# Patient Record
Sex: FEMALE | Race: WHITE | Employment: OTHER | URBAN - METROPOLITAN AREA
[De-identification: names, ages, dates, MRNs, and addresses within clinical notes are randomized per-mention and may not be internally consistent; named-entity substitution may affect disease eponyms.]

---

## 2017-04-29 ENCOUNTER — APPOINTMENT (OUTPATIENT)
Dept: ULTRASOUND IMAGING | Age: 82
End: 2017-04-29
Payer: MEDICARE

## 2017-04-29 ENCOUNTER — HOSPITAL ENCOUNTER (EMERGENCY)
Age: 82
Discharge: HOME | End: 2017-04-30
Payer: MEDICARE

## 2017-04-29 VITALS
HEART RATE: 73 BPM | DIASTOLIC BLOOD PRESSURE: 93 MMHG | WEIGHT: 180 LBS | HEIGHT: 64 IN | TEMPERATURE: 97.9 F | OXYGEN SATURATION: 95 % | SYSTOLIC BLOOD PRESSURE: 195 MMHG | BODY MASS INDEX: 30.73 KG/M2

## 2017-04-29 DIAGNOSIS — R60.9 PERIPHERAL EDEMA: Primary | ICD-10-CM

## 2017-04-29 LAB
ALBUMIN SERPL-MCNC: 3.5 G/DL (ref 3.9–4.9)
ALP BLD-CCNC: 111 U/L (ref 40–130)
ALT SERPL-CCNC: 10 U/L (ref 0–33)
ANION GAP SERPL CALCULATED.3IONS-SCNC: 8 MEQ/L (ref 7–13)
APTT: 34.9 SEC (ref 21.6–35.4)
AST SERPL-CCNC: 19 U/L (ref 0–35)
BASOPHILS ABSOLUTE: 0.1 K/UL (ref 0–0.2)
BASOPHILS RELATIVE PERCENT: 1.7 %
BILIRUB SERPL-MCNC: 0.4 MG/DL (ref 0–1.2)
BUN BLDV-MCNC: 10 MG/DL (ref 8–23)
CALCIUM SERPL-MCNC: 9.3 MG/DL (ref 8.6–10.2)
CHLORIDE BLD-SCNC: 100 MEQ/L (ref 98–107)
CO2: 27 MEQ/L (ref 22–29)
CREAT SERPL-MCNC: 0.72 MG/DL (ref 0.5–0.9)
EOSINOPHILS ABSOLUTE: 0.2 K/UL (ref 0–0.7)
EOSINOPHILS RELATIVE PERCENT: 3.4 %
GFR AFRICAN AMERICAN: >60
GFR NON-AFRICAN AMERICAN: >60
GLOBULIN: 2.9 G/DL (ref 2.3–3.5)
GLUCOSE BLD-MCNC: 118 MG/DL (ref 74–109)
HCT VFR BLD CALC: 38.3 % (ref 37–47)
HEMOGLOBIN: 12.6 G/DL (ref 12–16)
INR BLD: 1.7
LYMPHOCYTES ABSOLUTE: 1.5 K/UL (ref 1–4.8)
LYMPHOCYTES RELATIVE PERCENT: 21.7 %
MCH RBC QN AUTO: 29 PG (ref 27–31.3)
MCHC RBC AUTO-ENTMCNC: 32.8 % (ref 33–37)
MCV RBC AUTO: 88.3 FL (ref 82–100)
MONOCYTES ABSOLUTE: 0.6 K/UL (ref 0.2–0.8)
MONOCYTES RELATIVE PERCENT: 9.3 %
NEUTROPHILS ABSOLUTE: 4.3 K/UL (ref 1.4–6.5)
NEUTROPHILS RELATIVE PERCENT: 63.9 %
PDW BLD-RTO: 14.8 % (ref 11.5–14.5)
PLATELET # BLD: 233 K/UL (ref 130–400)
POTASSIUM SERPL-SCNC: 3.6 MEQ/L (ref 3.5–5.1)
PROTHROMBIN TIME: 18.7 SEC (ref 8.1–13.7)
RBC # BLD: 4.34 M/UL (ref 4.2–5.4)
SODIUM BLD-SCNC: 135 MEQ/L (ref 132–144)
TOTAL PROTEIN: 6.4 G/DL (ref 6.4–8.1)
WBC # BLD: 6.7 K/UL (ref 4.8–10.8)

## 2017-04-29 PROCEDURE — 36415 COLL VENOUS BLD VENIPUNCTURE: CPT

## 2017-04-29 PROCEDURE — 80053 COMPREHEN METABOLIC PANEL: CPT

## 2017-04-29 PROCEDURE — 93971 EXTREMITY STUDY: CPT

## 2017-04-29 PROCEDURE — 99284 EMERGENCY DEPT VISIT MOD MDM: CPT

## 2017-04-29 PROCEDURE — 85025 COMPLETE CBC W/AUTO DIFF WBC: CPT

## 2017-04-29 PROCEDURE — 85730 THROMBOPLASTIN TIME PARTIAL: CPT

## 2017-04-29 PROCEDURE — 85610 PROTHROMBIN TIME: CPT

## 2017-04-29 RX ORDER — SENNOSIDES 8.6 MG
500 CAPSULE ORAL
COMMUNITY
Start: 2012-08-14

## 2017-04-29 RX ORDER — CARVEDILOL 25 MG/1
25 TABLET ORAL 2 TIMES DAILY
COMMUNITY
Start: 2016-08-08

## 2017-04-29 RX ORDER — MIRTAZAPINE 30 MG/1
30 TABLET, FILM COATED ORAL
COMMUNITY
Start: 2016-10-14

## 2017-04-29 RX ORDER — WARFARIN SODIUM 2.5 MG/1
TABLET ORAL
Status: ON HOLD | COMMUNITY
Start: 2016-10-31 | End: 2020-07-13 | Stop reason: HOSPADM

## 2017-04-29 RX ORDER — LOSARTAN POTASSIUM 50 MG/1
50 TABLET ORAL
COMMUNITY
Start: 2017-04-20

## 2017-04-29 RX ORDER — CLONAZEPAM 0.5 MG/1
0.5 TABLET ORAL
Status: ON HOLD | COMMUNITY
Start: 2017-01-05 | End: 2018-01-12

## 2017-04-29 RX ORDER — POTASSIUM CHLORIDE 750 MG/1
10 TABLET, EXTENDED RELEASE ORAL 2 TIMES DAILY
COMMUNITY
Start: 2016-07-21

## 2017-04-30 ASSESSMENT — ENCOUNTER SYMPTOMS
ALLERGIC/IMMUNOLOGIC NEGATIVE: 1
APNEA: 0
TROUBLE SWALLOWING: 0
EYE PAIN: 0
ABDOMINAL PAIN: 0
COLOR CHANGE: 0
SHORTNESS OF BREATH: 0

## 2017-10-18 ENCOUNTER — HOSPITAL ENCOUNTER (OUTPATIENT)
Dept: MRI IMAGING | Age: 82
Discharge: HOME | End: 2017-10-18
Payer: MEDICARE

## 2017-10-18 DIAGNOSIS — M25.562 LEFT KNEE PAIN, UNSPECIFIED CHRONICITY: ICD-10-CM

## 2017-10-18 PROCEDURE — 73721 MRI JNT OF LWR EXTRE W/O DYE: CPT

## 2018-01-02 ENCOUNTER — HOSPITAL ENCOUNTER (OUTPATIENT)
Dept: PREADMISSION TESTING | Age: 83
Discharge: HOME | End: 2018-01-02
Payer: MEDICARE

## 2018-01-02 VITALS
HEIGHT: 64 IN | DIASTOLIC BLOOD PRESSURE: 86 MMHG | WEIGHT: 180.8 LBS | SYSTOLIC BLOOD PRESSURE: 178 MMHG | RESPIRATION RATE: 16 BRPM | OXYGEN SATURATION: 96 % | BODY MASS INDEX: 30.87 KG/M2 | TEMPERATURE: 98.1 F | HEART RATE: 78 BPM

## 2018-01-02 PROBLEM — M17.12 DEGENERATIVE JOINT DISEASE OF KNEE, LEFT: Status: ACTIVE | Noted: 2018-01-02

## 2018-01-02 LAB
ABO/RH: NORMAL
ANTIBODY SCREEN: NORMAL

## 2018-01-02 PROCEDURE — 86901 BLOOD TYPING SEROLOGIC RH(D): CPT

## 2018-01-02 PROCEDURE — 86850 RBC ANTIBODY SCREEN: CPT

## 2018-01-02 PROCEDURE — 86900 BLOOD TYPING SEROLOGIC ABO: CPT

## 2018-01-02 RX ORDER — LIDOCAINE HYDROCHLORIDE 10 MG/ML
1 INJECTION, SOLUTION EPIDURAL; INFILTRATION; INTRACAUDAL; PERINEURAL
Status: CANCELLED | OUTPATIENT
Start: 2018-01-02 | End: 2018-01-02

## 2018-01-02 RX ORDER — SODIUM CHLORIDE 0.9 % (FLUSH) 0.9 %
10 SYRINGE (ML) INJECTION EVERY 12 HOURS SCHEDULED
Status: CANCELLED | OUTPATIENT
Start: 2018-01-02

## 2018-01-02 RX ORDER — SODIUM CHLORIDE 0.9 % (FLUSH) 0.9 %
10 SYRINGE (ML) INJECTION PRN
Status: CANCELLED | OUTPATIENT
Start: 2018-01-02

## 2018-01-02 RX ORDER — SODIUM CHLORIDE, SODIUM LACTATE, POTASSIUM CHLORIDE, CALCIUM CHLORIDE 600; 310; 30; 20 MG/100ML; MG/100ML; MG/100ML; MG/100ML
INJECTION, SOLUTION INTRAVENOUS CONTINUOUS
Status: CANCELLED | OUTPATIENT
Start: 2018-01-02

## 2018-01-08 ENCOUNTER — ANESTHESIA EVENT (OUTPATIENT)
Dept: OPERATING ROOM | Age: 83
DRG: 469 | End: 2018-01-08
Payer: MEDICARE

## 2018-01-09 ENCOUNTER — HOSPITAL ENCOUNTER (INPATIENT)
Age: 83
LOS: 4 days | Discharge: HOME HEALTH | DRG: 469 | End: 2018-01-13
Attending: ORTHOPAEDIC SURGERY | Admitting: ORTHOPAEDIC SURGERY
Payer: MEDICARE

## 2018-01-09 ENCOUNTER — ANESTHESIA (OUTPATIENT)
Dept: OPERATING ROOM | Age: 83
DRG: 469 | End: 2018-01-09
Payer: MEDICARE

## 2018-01-09 ENCOUNTER — APPOINTMENT (OUTPATIENT)
Dept: GENERAL RADIOLOGY | Age: 83
DRG: 469 | End: 2018-01-09
Attending: ORTHOPAEDIC SURGERY
Payer: MEDICARE

## 2018-01-09 VITALS — OXYGEN SATURATION: 98 % | SYSTOLIC BLOOD PRESSURE: 126 MMHG | TEMPERATURE: 94.3 F | DIASTOLIC BLOOD PRESSURE: 68 MMHG

## 2018-01-09 PROBLEM — Z96.652 S/P TOTAL KNEE REPLACEMENT USING CEMENT, LEFT: Status: ACTIVE | Noted: 2018-01-09

## 2018-01-09 PROBLEM — I48.0 PAF (PAROXYSMAL ATRIAL FIBRILLATION) (HCC): Status: ACTIVE | Noted: 2017-01-05

## 2018-01-09 LAB
INR BLD: 1.1
PROTHROMBIN TIME: 11.7 SEC (ref 8.1–13.7)

## 2018-01-09 PROCEDURE — 3700000000 HC ANESTHESIA ATTENDED CARE: Performed by: ORTHOPAEDIC SURGERY

## 2018-01-09 PROCEDURE — G8987 SELF CARE CURRENT STATUS: HCPCS

## 2018-01-09 PROCEDURE — 6360000002 HC RX W HCPCS: Performed by: NURSE ANESTHETIST, CERTIFIED REGISTERED

## 2018-01-09 PROCEDURE — 97162 PT EVAL MOD COMPLEX 30 MIN: CPT

## 2018-01-09 PROCEDURE — 1210000000 HC MED SURG R&B

## 2018-01-09 PROCEDURE — 3600000014 HC SURGERY LEVEL 4 ADDTL 15MIN: Performed by: ORTHOPAEDIC SURGERY

## 2018-01-09 PROCEDURE — 7100000000 HC PACU RECOVERY - FIRST 15 MIN: Performed by: ORTHOPAEDIC SURGERY

## 2018-01-09 PROCEDURE — 0SRD0J9 REPLACEMENT OF LEFT KNEE JOINT WITH SYNTHETIC SUBSTITUTE, CEMENTED, OPEN APPROACH: ICD-10-PCS | Performed by: ORTHOPAEDIC SURGERY

## 2018-01-09 PROCEDURE — 3600000004 HC SURGERY LEVEL 4 BASE: Performed by: ORTHOPAEDIC SURGERY

## 2018-01-09 PROCEDURE — 6370000000 HC RX 637 (ALT 250 FOR IP): Performed by: NURSE PRACTITIONER

## 2018-01-09 PROCEDURE — 2580000003 HC RX 258: Performed by: STUDENT IN AN ORGANIZED HEALTH CARE EDUCATION/TRAINING PROGRAM

## 2018-01-09 PROCEDURE — 2580000003 HC RX 258: Performed by: ORTHOPAEDIC SURGERY

## 2018-01-09 PROCEDURE — 6360000002 HC RX W HCPCS: Performed by: ORTHOPAEDIC SURGERY

## 2018-01-09 PROCEDURE — 87086 URINE CULTURE/COLONY COUNT: CPT

## 2018-01-09 PROCEDURE — 97167 OT EVAL HIGH COMPLEX 60 MIN: CPT

## 2018-01-09 PROCEDURE — 6360000002 HC RX W HCPCS: Performed by: ANESTHESIOLOGY

## 2018-01-09 PROCEDURE — 3700000001 HC ADD 15 MINUTES (ANESTHESIA): Performed by: ORTHOPAEDIC SURGERY

## 2018-01-09 PROCEDURE — 85610 PROTHROMBIN TIME: CPT

## 2018-01-09 PROCEDURE — 6370000000 HC RX 637 (ALT 250 FOR IP): Performed by: ORTHOPAEDIC SURGERY

## 2018-01-09 PROCEDURE — 64445 NJX AA&/STRD SCIATIC NRV IMG: CPT | Performed by: ANESTHESIOLOGY

## 2018-01-09 PROCEDURE — C1713 ANCHOR/SCREW BN/BN,TIS/BN: HCPCS | Performed by: ORTHOPAEDIC SURGERY

## 2018-01-09 PROCEDURE — G8988 SELF CARE GOAL STATUS: HCPCS

## 2018-01-09 PROCEDURE — 2500000003 HC RX 250 WO HCPCS: Performed by: NURSE ANESTHETIST, CERTIFIED REGISTERED

## 2018-01-09 PROCEDURE — G8978 MOBILITY CURRENT STATUS: HCPCS

## 2018-01-09 PROCEDURE — G8979 MOBILITY GOAL STATUS: HCPCS

## 2018-01-09 PROCEDURE — 7100000001 HC PACU RECOVERY - ADDTL 15 MIN: Performed by: ORTHOPAEDIC SURGERY

## 2018-01-09 PROCEDURE — 2500000003 HC RX 250 WO HCPCS: Performed by: ORTHOPAEDIC SURGERY

## 2018-01-09 PROCEDURE — A6199 ALGINATE DRSG WOUND FILLER: HCPCS | Performed by: ORTHOPAEDIC SURGERY

## 2018-01-09 PROCEDURE — 6370000000 HC RX 637 (ALT 250 FOR IP): Performed by: INTERNAL MEDICINE

## 2018-01-09 PROCEDURE — 2720000010 HC SURG SUPPLY STERILE: Performed by: ORTHOPAEDIC SURGERY

## 2018-01-09 PROCEDURE — C1776 JOINT DEVICE (IMPLANTABLE): HCPCS | Performed by: ORTHOPAEDIC SURGERY

## 2018-01-09 PROCEDURE — 2500000003 HC RX 250 WO HCPCS: Performed by: STUDENT IN AN ORGANIZED HEALTH CARE EDUCATION/TRAINING PROGRAM

## 2018-01-09 PROCEDURE — 73560 X-RAY EXAM OF KNEE 1 OR 2: CPT

## 2018-01-09 DEVICE — Z DUP USE 2207257 PSN TIB STM 5 DEG SZ E L: Type: IMPLANTABLE DEVICE | Status: FUNCTIONAL

## 2018-01-09 DEVICE — CEMENT BNE 20ML 40GM ACRYL RESIN HI VISC RADPQ FAST SET: Type: IMPLANTABLE DEVICE | Status: FUNCTIONAL

## 2018-01-09 DEVICE — ANCHOR SUT L14MM DIA4.5MM BIOCOMPOSITE W/ TWO SZ 2: Type: IMPLANTABLE DEVICE | Status: FUNCTIONAL

## 2018-01-09 DEVICE — IMPLANTABLE DEVICE: Type: IMPLANTABLE DEVICE | Status: FUNCTIONAL

## 2018-01-09 DEVICE — SCREWDRIVER SURG OD2MM HEX FEM CANN KNEE SET S STL NXGN: Type: IMPLANTABLE DEVICE | Status: FUNCTIONAL

## 2018-01-09 DEVICE — DUP USE 360449 IMPL KNEE PSN STR HYB ST 14 X +30 M: Type: IMPLANTABLE DEVICE | Status: FUNCTIONAL

## 2018-01-09 DEVICE — JIG SURG LT KNEE AREF PT SPEC DISP PERSONA: Type: IMPLANTABLE DEVICE | Status: FUNCTIONAL

## 2018-01-09 DEVICE — COMPONENT PAT DIA26MM THK7.5MM POLYETH CEM CONVENTIONAL: Type: IMPLANTABLE DEVICE | Status: FUNCTIONAL

## 2018-01-09 DEVICE — SYSTEM TOT KNEE: Type: IMPLANTABLE DEVICE | Status: FUNCTIONAL

## 2018-01-09 RX ORDER — MIRTAZAPINE 15 MG/1
30 TABLET, FILM COATED ORAL NIGHTLY
Status: DISCONTINUED | OUTPATIENT
Start: 2018-01-09 | End: 2018-01-10

## 2018-01-09 RX ORDER — SODIUM CHLORIDE, SODIUM LACTATE, POTASSIUM CHLORIDE, CALCIUM CHLORIDE 600; 310; 30; 20 MG/100ML; MG/100ML; MG/100ML; MG/100ML
INJECTION, SOLUTION INTRAVENOUS CONTINUOUS
Status: DISCONTINUED | OUTPATIENT
Start: 2018-01-09 | End: 2018-01-09

## 2018-01-09 RX ORDER — ONDANSETRON 2 MG/ML
4 INJECTION INTRAMUSCULAR; INTRAVENOUS EVERY 6 HOURS PRN
Status: DISCONTINUED | OUTPATIENT
Start: 2018-01-09 | End: 2018-01-13 | Stop reason: HOSPADM

## 2018-01-09 RX ORDER — CARVEDILOL 25 MG/1
25 TABLET ORAL 2 TIMES DAILY
Status: DISCONTINUED | OUTPATIENT
Start: 2018-01-09 | End: 2018-01-13 | Stop reason: HOSPADM

## 2018-01-09 RX ORDER — ROPIVACAINE HYDROCHLORIDE 5 MG/ML
INJECTION, SOLUTION EPIDURAL; INFILTRATION; PERINEURAL PRN
Status: DISCONTINUED | OUTPATIENT
Start: 2018-01-09 | End: 2018-01-09 | Stop reason: SDUPTHER

## 2018-01-09 RX ORDER — POTASSIUM CHLORIDE 750 MG/1
10 TABLET, FILM COATED, EXTENDED RELEASE ORAL 2 TIMES DAILY
Status: DISCONTINUED | OUTPATIENT
Start: 2018-01-09 | End: 2018-01-13 | Stop reason: HOSPADM

## 2018-01-09 RX ORDER — HYDRALAZINE HYDROCHLORIDE 20 MG/ML
20 INJECTION INTRAMUSCULAR; INTRAVENOUS EVERY 4 HOURS PRN
Status: DISCONTINUED | OUTPATIENT
Start: 2018-01-09 | End: 2018-01-13 | Stop reason: HOSPADM

## 2018-01-09 RX ORDER — SENNA AND DOCUSATE SODIUM 50; 8.6 MG/1; MG/1
1 TABLET, FILM COATED ORAL DAILY
Status: DISCONTINUED | OUTPATIENT
Start: 2018-01-09 | End: 2018-01-13 | Stop reason: HOSPADM

## 2018-01-09 RX ORDER — BISACODYL 10 MG
10 SUPPOSITORY, RECTAL RECTAL DAILY PRN
Status: DISCONTINUED | OUTPATIENT
Start: 2018-01-09 | End: 2018-01-13 | Stop reason: HOSPADM

## 2018-01-09 RX ORDER — DIMETHICONE, CAMPHOR (SYNTHETIC), MENTHOL, AND PHENOL 1.1; .5; .625; .5 G/100G; G/100G; G/100G; G/100G
OINTMENT TOPICAL
Status: DISPENSED
Start: 2018-01-09 | End: 2018-01-09

## 2018-01-09 RX ORDER — HYDROCODONE BITARTRATE AND ACETAMINOPHEN 5; 325 MG/1; MG/1
2 TABLET ORAL PRN
Status: DISCONTINUED | OUTPATIENT
Start: 2018-01-09 | End: 2018-01-09 | Stop reason: HOSPADM

## 2018-01-09 RX ORDER — SODIUM CHLORIDE 0.9 % (FLUSH) 0.9 %
10 SYRINGE (ML) INJECTION PRN
Status: DISCONTINUED | OUTPATIENT
Start: 2018-01-09 | End: 2018-01-09 | Stop reason: HOSPADM

## 2018-01-09 RX ORDER — SODIUM CHLORIDE 9 MG/ML
INJECTION, SOLUTION INTRAVENOUS CONTINUOUS
Status: DISCONTINUED | OUTPATIENT
Start: 2018-01-09 | End: 2018-01-10

## 2018-01-09 RX ORDER — PROPOFOL 10 MG/ML
INJECTION, EMULSION INTRAVENOUS CONTINUOUS PRN
Status: DISCONTINUED | OUTPATIENT
Start: 2018-01-09 | End: 2018-01-09 | Stop reason: SDUPTHER

## 2018-01-09 RX ORDER — MORPHINE SULFATE 4 MG/ML
4 INJECTION, SOLUTION INTRAMUSCULAR; INTRAVENOUS
Status: DISCONTINUED | OUTPATIENT
Start: 2018-01-09 | End: 2018-01-09

## 2018-01-09 RX ORDER — DIPHENHYDRAMINE HYDROCHLORIDE 50 MG/ML
12.5 INJECTION INTRAMUSCULAR; INTRAVENOUS
Status: DISCONTINUED | OUTPATIENT
Start: 2018-01-09 | End: 2018-01-09 | Stop reason: HOSPADM

## 2018-01-09 RX ORDER — MIDAZOLAM HYDROCHLORIDE 1 MG/ML
INJECTION INTRAMUSCULAR; INTRAVENOUS PRN
Status: DISCONTINUED | OUTPATIENT
Start: 2018-01-09 | End: 2018-01-09 | Stop reason: SDUPTHER

## 2018-01-09 RX ORDER — ONDANSETRON 2 MG/ML
4 INJECTION INTRAMUSCULAR; INTRAVENOUS
Status: DISCONTINUED | OUTPATIENT
Start: 2018-01-09 | End: 2018-01-09 | Stop reason: HOSPADM

## 2018-01-09 RX ORDER — SODIUM CHLORIDE 0.9 % (FLUSH) 0.9 %
10 SYRINGE (ML) INJECTION EVERY 12 HOURS SCHEDULED
Status: DISCONTINUED | OUTPATIENT
Start: 2018-01-09 | End: 2018-01-09 | Stop reason: HOSPADM

## 2018-01-09 RX ORDER — POTASSIUM CHLORIDE 750 MG/1
10 TABLET, EXTENDED RELEASE ORAL 2 TIMES DAILY
Status: DISCONTINUED | OUTPATIENT
Start: 2018-01-09 | End: 2018-01-09 | Stop reason: CLARIF

## 2018-01-09 RX ORDER — ACETAMINOPHEN 325 MG/1
650 TABLET ORAL EVERY 4 HOURS PRN
Status: DISCONTINUED | OUTPATIENT
Start: 2018-01-09 | End: 2018-01-13 | Stop reason: HOSPADM

## 2018-01-09 RX ORDER — DOCUSATE SODIUM 100 MG/1
100 CAPSULE, LIQUID FILLED ORAL 2 TIMES DAILY
Status: DISCONTINUED | OUTPATIENT
Start: 2018-01-09 | End: 2018-01-13 | Stop reason: HOSPADM

## 2018-01-09 RX ORDER — DEXAMETHASONE SODIUM PHOSPHATE 10 MG/ML
INJECTION INTRAMUSCULAR; INTRAVENOUS PRN
Status: DISCONTINUED | OUTPATIENT
Start: 2018-01-09 | End: 2018-01-09 | Stop reason: SDUPTHER

## 2018-01-09 RX ORDER — KETOROLAC TROMETHAMINE 30 MG/ML
30 INJECTION, SOLUTION INTRAMUSCULAR; INTRAVENOUS EVERY 6 HOURS
Status: COMPLETED | OUTPATIENT
Start: 2018-01-09 | End: 2018-01-09

## 2018-01-09 RX ORDER — FENTANYL CITRATE 50 UG/ML
50 INJECTION, SOLUTION INTRAMUSCULAR; INTRAVENOUS EVERY 10 MIN PRN
Status: DISCONTINUED | OUTPATIENT
Start: 2018-01-09 | End: 2018-01-09 | Stop reason: HOSPADM

## 2018-01-09 RX ORDER — LIDOCAINE HYDROCHLORIDE 10 MG/ML
1 INJECTION, SOLUTION EPIDURAL; INFILTRATION; INTRACAUDAL; PERINEURAL
Status: COMPLETED | OUTPATIENT
Start: 2018-01-09 | End: 2018-01-09

## 2018-01-09 RX ORDER — WARFARIN SODIUM 2.5 MG/1
2.5 TABLET ORAL DAILY
Status: DISCONTINUED | OUTPATIENT
Start: 2018-01-09 | End: 2018-01-12

## 2018-01-09 RX ORDER — OXYCODONE HYDROCHLORIDE AND ACETAMINOPHEN 5; 325 MG/1; MG/1
1 TABLET ORAL EVERY 4 HOURS PRN
Status: DISCONTINUED | OUTPATIENT
Start: 2018-01-09 | End: 2018-01-10

## 2018-01-09 RX ORDER — SODIUM CHLORIDE 0.9 % (FLUSH) 0.9 %
10 SYRINGE (ML) INJECTION EVERY 12 HOURS SCHEDULED
Status: DISCONTINUED | OUTPATIENT
Start: 2018-01-09 | End: 2018-01-13 | Stop reason: HOSPADM

## 2018-01-09 RX ORDER — ACETAMINOPHEN 325 MG/1
650 TABLET ORAL EVERY 4 HOURS PRN
Status: DISCONTINUED | OUTPATIENT
Start: 2018-01-09 | End: 2018-01-10

## 2018-01-09 RX ORDER — SODIUM CHLORIDE 0.9 % (FLUSH) 0.9 %
10 SYRINGE (ML) INJECTION PRN
Status: DISCONTINUED | OUTPATIENT
Start: 2018-01-09 | End: 2018-01-13 | Stop reason: HOSPADM

## 2018-01-09 RX ORDER — SCOLOPAMINE TRANSDERMAL SYSTEM 1 MG/1
1 PATCH, EXTENDED RELEASE TRANSDERMAL
Status: DISCONTINUED | OUTPATIENT
Start: 2018-01-09 | End: 2018-01-09 | Stop reason: HOSPADM

## 2018-01-09 RX ORDER — HYDROCODONE BITARTRATE AND ACETAMINOPHEN 5; 325 MG/1; MG/1
1 TABLET ORAL PRN
Status: DISCONTINUED | OUTPATIENT
Start: 2018-01-09 | End: 2018-01-09 | Stop reason: HOSPADM

## 2018-01-09 RX ORDER — METOCLOPRAMIDE HYDROCHLORIDE 5 MG/ML
10 INJECTION INTRAMUSCULAR; INTRAVENOUS
Status: DISCONTINUED | OUTPATIENT
Start: 2018-01-09 | End: 2018-01-09 | Stop reason: HOSPADM

## 2018-01-09 RX ORDER — MAGNESIUM HYDROXIDE 1200 MG/15ML
LIQUID ORAL CONTINUOUS PRN
Status: DISCONTINUED | OUTPATIENT
Start: 2018-01-09 | End: 2018-01-09 | Stop reason: HOSPADM

## 2018-01-09 RX ORDER — MEPERIDINE HYDROCHLORIDE 25 MG/ML
12.5 INJECTION INTRAMUSCULAR; INTRAVENOUS; SUBCUTANEOUS EVERY 5 MIN PRN
Status: DISCONTINUED | OUTPATIENT
Start: 2018-01-09 | End: 2018-01-09 | Stop reason: HOSPADM

## 2018-01-09 RX ORDER — OXYCODONE HYDROCHLORIDE AND ACETAMINOPHEN 5; 325 MG/1; MG/1
2 TABLET ORAL EVERY 4 HOURS PRN
Status: DISCONTINUED | OUTPATIENT
Start: 2018-01-09 | End: 2018-01-10

## 2018-01-09 RX ORDER — MORPHINE SULFATE 2 MG/ML
2 INJECTION, SOLUTION INTRAMUSCULAR; INTRAVENOUS
Status: DISCONTINUED | OUTPATIENT
Start: 2018-01-09 | End: 2018-01-10

## 2018-01-09 RX ORDER — ASPIRIN 81 MG/1
81 TABLET ORAL 2 TIMES DAILY
Status: DISCONTINUED | OUTPATIENT
Start: 2018-01-10 | End: 2018-01-09

## 2018-01-09 RX ORDER — BUPIVACAINE HYDROCHLORIDE 5 MG/ML
INJECTION, SOLUTION EPIDURAL; INTRACAUDAL PRN
Status: DISCONTINUED | OUTPATIENT
Start: 2018-01-09 | End: 2018-01-09 | Stop reason: SDUPTHER

## 2018-01-09 RX ORDER — LIDOCAINE HYDROCHLORIDE 20 MG/ML
INJECTION, SOLUTION INFILTRATION; PERINEURAL PRN
Status: DISCONTINUED | OUTPATIENT
Start: 2018-01-09 | End: 2018-01-09 | Stop reason: SDUPTHER

## 2018-01-09 RX ORDER — CLONAZEPAM 0.5 MG/1
0.5 TABLET ORAL 2 TIMES DAILY PRN
Status: DISCONTINUED | OUTPATIENT
Start: 2018-01-09 | End: 2018-01-13 | Stop reason: HOSPADM

## 2018-01-09 RX ORDER — LOSARTAN POTASSIUM 50 MG/1
50 TABLET ORAL DAILY
Status: DISCONTINUED | OUTPATIENT
Start: 2018-01-09 | End: 2018-01-13 | Stop reason: HOSPADM

## 2018-01-09 RX ADMIN — DOCUSATE SODIUM AND SENNOSIDES 1 TABLET: 8.6; 5 TABLET, FILM COATED ORAL at 13:51

## 2018-01-09 RX ADMIN — MIDAZOLAM HYDROCHLORIDE 3 MG: 1 INJECTION, SOLUTION INTRAMUSCULAR; INTRAVENOUS at 07:20

## 2018-01-09 RX ADMIN — OXYCODONE HYDROCHLORIDE AND ACETAMINOPHEN 1 TABLET: 5; 325 TABLET ORAL at 22:07

## 2018-01-09 RX ADMIN — CARVEDILOL 25 MG: 25 TABLET, FILM COATED ORAL at 20:59

## 2018-01-09 RX ADMIN — CEFAZOLIN SODIUM 2 G: 1 INJECTION, SOLUTION INTRAVENOUS at 07:39

## 2018-01-09 RX ADMIN — SODIUM CHLORIDE, POTASSIUM CHLORIDE, SODIUM LACTATE AND CALCIUM CHLORIDE: 600; 310; 30; 20 INJECTION, SOLUTION INTRAVENOUS at 07:01

## 2018-01-09 RX ADMIN — KETOROLAC TROMETHAMINE 30 MG: 30 INJECTION, SOLUTION INTRAMUSCULAR at 20:58

## 2018-01-09 RX ADMIN — PROPOFOL 50 MCG/KG/MIN: 10 INJECTION, EMULSION INTRAVENOUS at 08:05

## 2018-01-09 RX ADMIN — FENTANYL CITRATE 25 MCG: 50 INJECTION, SOLUTION INTRAMUSCULAR; INTRAVENOUS at 10:27

## 2018-01-09 RX ADMIN — SODIUM CHLORIDE: 9 INJECTION, SOLUTION INTRAVENOUS at 13:51

## 2018-01-09 RX ADMIN — CEFAZOLIN SODIUM 2 G: 2 SOLUTION INTRAVENOUS at 23:44

## 2018-01-09 RX ADMIN — VITAMIN D, TAB 1000IU (100/BT) 2000 UNITS: 25 TAB at 13:51

## 2018-01-09 RX ADMIN — DOCUSATE SODIUM 100 MG: 100 CAPSULE, LIQUID FILLED ORAL at 13:51

## 2018-01-09 RX ADMIN — POTASSIUM CHLORIDE 10 MEQ: 750 TABLET, FILM COATED, EXTENDED RELEASE ORAL at 13:56

## 2018-01-09 RX ADMIN — LOSARTAN POTASSIUM 50 MG: 50 TABLET ORAL at 13:51

## 2018-01-09 RX ADMIN — LIDOCAINE HYDROCHLORIDE 0.1 ML: 10 INJECTION, SOLUTION EPIDURAL; INFILTRATION; INTRACAUDAL; PERINEURAL at 07:00

## 2018-01-09 RX ADMIN — BUPIVACAINE HYDROCHLORIDE 2 ML: 5 INJECTION, SOLUTION EPIDURAL; INTRACAUDAL; PERINEURAL at 07:50

## 2018-01-09 RX ADMIN — POTASSIUM CHLORIDE 10 MEQ: 750 TABLET, FILM COATED, EXTENDED RELEASE ORAL at 20:59

## 2018-01-09 RX ADMIN — ROPIVACAINE HYDROCHLORIDE 15 ML: 5 INJECTION, SOLUTION EPIDURAL; INFILTRATION; PERINEURAL at 07:30

## 2018-01-09 RX ADMIN — SODIUM CHLORIDE, POTASSIUM CHLORIDE, SODIUM LACTATE AND CALCIUM CHLORIDE: 600; 310; 30; 20 INJECTION, SOLUTION INTRAVENOUS at 08:10

## 2018-01-09 RX ADMIN — CEFAZOLIN SODIUM 2 G: 2 SOLUTION INTRAVENOUS at 16:38

## 2018-01-09 RX ADMIN — LIDOCAINE HYDROCHLORIDE 50 MG: 20 INJECTION, SOLUTION INFILTRATION; PERINEURAL at 08:05

## 2018-01-09 RX ADMIN — VITAMIN D, TAB 1000IU (100/BT) 2000 UNITS: 25 TAB at 20:59

## 2018-01-09 RX ADMIN — MIDAZOLAM HYDROCHLORIDE 2 MG: 1 INJECTION, SOLUTION INTRAMUSCULAR; INTRAVENOUS at 08:00

## 2018-01-09 RX ADMIN — WARFARIN SODIUM 2.5 MG: 2.5 TABLET ORAL at 17:35

## 2018-01-09 RX ADMIN — KETOROLAC TROMETHAMINE 30 MG: 30 INJECTION, SOLUTION INTRAMUSCULAR at 13:53

## 2018-01-09 RX ADMIN — DOCUSATE SODIUM 100 MG: 100 CAPSULE, LIQUID FILLED ORAL at 20:59

## 2018-01-09 RX ADMIN — DEXAMETHASONE SODIUM PHOSPHATE 5 MG: 10 INJECTION INTRAMUSCULAR; INTRAVENOUS at 07:30

## 2018-01-09 ASSESSMENT — PULMONARY FUNCTION TESTS
PIF_VALUE: 18
PIF_VALUE: 0
PIF_VALUE: 18
PIF_VALUE: 0
PIF_VALUE: 18
PIF_VALUE: 0
PIF_VALUE: 18
PIF_VALUE: 0
PIF_VALUE: 18
PIF_VALUE: 0
PIF_VALUE: 18
PIF_VALUE: 18
PIF_VALUE: 1
PIF_VALUE: 18
PIF_VALUE: 0
PIF_VALUE: 18
PIF_VALUE: 0
PIF_VALUE: 18
PIF_VALUE: 0
PIF_VALUE: 0
PIF_VALUE: 18
PIF_VALUE: 18
PIF_VALUE: 3
PIF_VALUE: 18
PIF_VALUE: 0
PIF_VALUE: 17
PIF_VALUE: 18
PIF_VALUE: 17
PIF_VALUE: 18
PIF_VALUE: 0
PIF_VALUE: 0
PIF_VALUE: 17
PIF_VALUE: 18
PIF_VALUE: 0
PIF_VALUE: 18
PIF_VALUE: 0
PIF_VALUE: 18
PIF_VALUE: 0
PIF_VALUE: 0
PIF_VALUE: 18
PIF_VALUE: 0
PIF_VALUE: 18
PIF_VALUE: 18
PIF_VALUE: 0
PIF_VALUE: 18
PIF_VALUE: 0
PIF_VALUE: 18
PIF_VALUE: 17
PIF_VALUE: 18
PIF_VALUE: 18
PIF_VALUE: 0
PIF_VALUE: 18
PIF_VALUE: 17
PIF_VALUE: 18
PIF_VALUE: 17
PIF_VALUE: 18
PIF_VALUE: 18
PIF_VALUE: 0
PIF_VALUE: 1

## 2018-01-09 ASSESSMENT — PAIN DESCRIPTION - FREQUENCY: FREQUENCY: CONTINUOUS

## 2018-01-09 ASSESSMENT — PAIN DESCRIPTION - LOCATION: LOCATION: BACK

## 2018-01-09 ASSESSMENT — PAIN SCALES - GENERAL
PAINLEVEL_OUTOF10: 6
PAINLEVEL_OUTOF10: 5
PAINLEVEL_OUTOF10: 0
PAINLEVEL_OUTOF10: 0

## 2018-01-09 ASSESSMENT — PAIN DESCRIPTION - PAIN TYPE: TYPE: ACUTE PAIN;CHRONIC PAIN

## 2018-01-09 ASSESSMENT — PAIN DESCRIPTION - ORIENTATION: ORIENTATION: MID

## 2018-01-09 ASSESSMENT — PAIN - FUNCTIONAL ASSESSMENT: PAIN_FUNCTIONAL_ASSESSMENT: 0-10

## 2018-01-09 ASSESSMENT — PAIN DESCRIPTION - ONSET: ONSET: ON-GOING

## 2018-01-09 ASSESSMENT — PAIN DESCRIPTION - DESCRIPTORS: DESCRIPTORS: ACHING

## 2018-01-09 NOTE — PROGRESS NOTES
[facility] THERAPY EVALUATION - ACUTE   Room: N229/N229-01  Date: 2018  Patient Name: Denys Banks        MRN: [id number]  Account: [de-identified]   : 1929  (80 y.o.)    Chart Review:  Diagnosis:  L TKR   Past Medical History:   Diagnosis Date    Arthritis     Disease of blood and blood forming organ     DVT (deep vein thrombosis) in pregnancy ([facility] [address]     History of blood transfusion     Hx of blood clots     Pulmonary    Hyperlipidemia 2011    Hypertension     PONV (postoperative nausea and vomiting)     vomiting    Presence of IVC filter     Pulmonary embolism (HCC)     Wrist fracture, right      Past Surgical History:   Procedure Laterality Date    APPENDECTOMY      CATARACT REMOVAL Bilateral     CHOLECYSTECTOMY      EYE SURGERY      [employer] bilateral implants    HEMORRHOID SURGERY      HYSTERECTOMY      KNEE SURGERY Right     WRIST FRACTURE SURGERY Right      Precautions:  falls  Other position/activity restrictions: please do NOT bend pass 90 degrees and keep knee bend at 30 degrees when in bed per ortho  Required Braces or Orthoses  Left Lower Extremity Brace: Knee Brace  LLE Brace Type: immobilizer until active SLR, Knee immobilizer OOB    Evaluation and Pt. rights have been reviewed: [x]Yes   [] No   If no why not:   Falls safety interventions in place  [x]Yes   [] No    Comments:     Subjective: \"i think Im still numb\"    Prior living arrangement:    Support contact:   Pt lives: [] Alone   [x] With spouse   [] Other   Comment:    Home: [x] Single level   []  Two level   []  Split level     []  Apartment:     Entrance:  Stairs: 4 Hand rails 1,    Inside: Stairs: 0 Hand rails: 0  Bathroom: [] Bath tub   [x] Tub/Shower combo   [x]  Shower stall    Location: main level    DME: [x] W/W   [x] Cane   [] Rollator   []  W/C   [x] Grab Bars   [x] Shower Chair   [] [facility]  [] Dressing  AE  [] Other:      Previous Functional Status:  Recently sponge bathing, amb with a cane PRN, Ind with ADLs and IADLs drives shops and helps care for     Pain:   Start of session: 4/10  Location: L LE  Description: hurts  Action: [] No Action Necessary    [x] Patient reports pain at acceptable level for treatment  [] Nursing notified    [] Other      Objective:  Observation:  Alert, cooperative    Orientation: Oriented to  [x] Person   [x] Place  [x]Time    Vision:   [x]  [facility]   [] Impaired  Comments:      Hearing:  [x] WFL   [] Impaired  Comments:    Sensation:   [x] WFL   []  Impaired   Comments:      Cognition:   [x] WFL   [] Impaired  Comments:    Communication:   [x] WFL   [] Impaired  Comments:    Hand Dominance: [x] Right  [] Left    Range of Motion:  R UE AROM/PROM: [x]  WFL [] Impaired  Comments:   L UE AROM/PROM:  [x]  WFL [] Impaired  Comments:     Strength:   R UE Strength: []1    [] 2   [] 2+   [] 3   [] 3+   [x] 4   [] 4+  [] 5  Comments:   L UE Strength:  []1    [] 2   [] 2+   [] 3   [] 3+  [x] 4   [] 4+   [] 5  Comments:     Quality of Movement:  [x] Good   [] Fair   [] Poor     Coordination:  Gross motor: [x] WFL   [] Impaired   Fine motor: [x] WFL   [] Impaired     Functional Mobility:  Toilet Transfers:  Anticipated CGA with WW to [facility] with functional mobility seen  Bed Transfer: Mod A supine to sit  Sit to stand: Min A sit to stand  Bed to Chair:  CGA with [employer], min verb sues for safety    Seated Balance:      Static: [x] Good  [] Fair   [] Poor   Dynamic: []  Good  [x] Fair   [] Poor     Standing Balance: With [employer] and Knee immobilizer   Static: [] Good   [x] Fair  [] Poor   Dynamic: [] Good   [x] Fair   [] Poor     Functional Endurance: [] Good  [x] Fair  [] Poor     ADLs  Feeding:  Ind  UE Dressing:  CGA  LB Dressing:  Max A  Bathing:   Mod A simulated  Toileting: rachna odom able to reach behind in simulated hygiene for after BM  Grooming: Set up seated    Goals:   Patient will:   [x]  Improve functional endurance to tolerate/complete 30 mins of ADL's   [x]  Be Ind in

## 2018-01-09 NOTE — PROGRESS NOTES
L knee immobilizer in place. Opened and ice placed at this time. Ace wrap is in place, all clean, dry, intact.

## 2018-01-09 NOTE — ANESTHESIA PROCEDURE NOTES
Spinal Block    Patient location during procedure: OR  Reason for block: primary anesthetic  Staffing  Anesthesiologist: Kayleigh Stover  Resident/CRNA: Juan Pak  Preanesthetic Checklist  Completed: patient identified, site marked, surgical consent, pre-op evaluation, timeout performed, IV checked, risks and benefits discussed, monitors and equipment checked, anesthesia consent given, oxygen available and patient being monitored  Spinal Block  Patient position: sitting  Prep: ChloraPrep  Patient monitoring: continuous pulse ox and frequent blood pressure checks  Approach: right paramedian  Location: L3/L4  Provider prep: mask and sterile gloves  Agent: bupivacaine  Dose: 2  Dose: 2  Needle  Needle type: Quincke   Needle gauge: 22 G  Needle length: 3.5 in  Needle insertion depth: 7 cm  Kit: [id number]  Lot number: [id number]  Expiration date: 5/31/2019  Assessment  Sensory level: T6  Swirl obtained: No  CSF: clear  Attempts: 2

## 2018-01-09 NOTE — OP NOTE
to proceed. .  Informed consent was  obtained prior to arrival in the operating room. OPERATION AND FINDINGS:  The patient was brought to the operating room and  placed on the surgical table in the supine position whereupon adequate  anesthesia was then obtained. A surgical time-out was performed. The  patient received preoperative antibiotics. The patient was prepped and  draped in the usual sterile manner. The leg was then exsanguinated with an  Esmarch bandage and the tourniquet was applied at 300 mmHg. A linear midline incision was made from the superior pole of the patella to  the tibial tubercle, identifying the entire extensor mechanism. A medial  parapatellar arthrotomy was performed and the patella was everted. The fat  pad was excised and once this was complete the patient was noted to have  significant hypertrophic synovium with degenerative joint disease and large  osteophyte formation. Medial and lateral femoral retractors were inserted. The Magdy PSI custom  cutting guide was applied and pinned into position. Once this was  complete, the guide was removed and the distal femoral resection was  performed. With the distal femoral resection complete, the pins were  inserted over the distal femur and the multipurpose cutting guide was  applied and screwed into position. With the guide in position, the  anterior, posterior and chamfer cuts were made without difficulty. Once  the femoral cuts were complete, a posterior retractor was inserted. The  medial and lateral tibial retractors were inserted and the meniscal  remnants were excised. The Magdy custom PSI cutting pin guide for the  tibia was pinned in position. The guide was removed and the tibial  resection was made after checking the extramedullary alignment with the  extramedullary alignment device. Once the tibial resection was complete, the tibial components were  inserted. Patellar reaming was performed.   Peg holes were drilled in the  patella and a trial patella was applied. The knee was placed through a  range of motion and once appropriate stability was obtained, trial  components were removed. All bony surfaces were irrigated with copious amounts of saline irrigation. The components were inserted as indicated. A deep drain placed in the knee  joint. The capsule was closed using interrupted #1, the subcutaneous  tissues were closed using #3-0 Monocryl and staples were then used for the  skin. Dry sterile bulky dressing applied. The patient tolerated the  procedure well, taken to the PACU good condition without complication.         Leyda Patino MD    D: 01/09/2018 10:02:16       T: 01/09/2018 11:23:56     [id number]  Job#: [id number]     Doc#: [id number]    CC:

## 2018-01-10 PROBLEM — D64.9 ANEMIA: Chronic | Status: ACTIVE | Noted: 2018-01-10

## 2018-01-10 LAB
ANION GAP SERPL CALCULATED.3IONS-SCNC: 13 MEQ/L (ref 7–13)
BUN BLDV-MCNC: 14 MG/DL (ref 8–23)
CALCIUM SERPL-MCNC: 8.7 MG/DL (ref 8.6–10.2)
CHLORIDE BLD-SCNC: 104 MEQ/L (ref 98–107)
CO2: 24 MEQ/L (ref 22–29)
CREAT SERPL-MCNC: 0.82 MG/DL (ref 0.5–0.9)
GFR AFRICAN AMERICAN: >60
GFR NON-AFRICAN AMERICAN: >60
GLUCOSE BLD-MCNC: 138 MG/DL (ref 74–109)
HCT VFR BLD CALC: 35.4 % (ref 37–47)
HEMOGLOBIN: 11.7 G/DL (ref 12–16)
INR BLD: 1.2
MCH RBC QN AUTO: 29.6 PG (ref 27–31.3)
MCHC RBC AUTO-ENTMCNC: 32.9 % (ref 33–37)
MCV RBC AUTO: 89.8 FL (ref 82–100)
PDW BLD-RTO: 14.8 % (ref 11.5–14.5)
PLATELET # BLD: 216 K/UL (ref 130–400)
POTASSIUM SERPL-SCNC: 4.5 MEQ/L (ref 3.5–5.1)
PROTHROMBIN TIME: 12.4 SEC (ref 8.1–13.7)
RBC # BLD: 3.95 M/UL (ref 4.2–5.4)
SODIUM BLD-SCNC: 141 MEQ/L (ref 132–144)
URINE CULTURE, ROUTINE: NORMAL
WBC # BLD: 8.5 K/UL (ref 4.8–10.8)

## 2018-01-10 PROCEDURE — 85610 PROTHROMBIN TIME: CPT

## 2018-01-10 PROCEDURE — 6370000000 HC RX 637 (ALT 250 FOR IP): Performed by: NURSE PRACTITIONER

## 2018-01-10 PROCEDURE — 85027 COMPLETE CBC AUTOMATED: CPT

## 2018-01-10 PROCEDURE — 97116 GAIT TRAINING THERAPY: CPT

## 2018-01-10 PROCEDURE — 6370000000 HC RX 637 (ALT 250 FOR IP): Performed by: ORTHOPAEDIC SURGERY

## 2018-01-10 PROCEDURE — 2580000003 HC RX 258: Performed by: ORTHOPAEDIC SURGERY

## 2018-01-10 PROCEDURE — 97535 SELF CARE MNGMENT TRAINING: CPT

## 2018-01-10 PROCEDURE — 1210000000 HC MED SURG R&B

## 2018-01-10 PROCEDURE — 6370000000 HC RX 637 (ALT 250 FOR IP): Performed by: INTERNAL MEDICINE

## 2018-01-10 PROCEDURE — 97110 THERAPEUTIC EXERCISES: CPT

## 2018-01-10 PROCEDURE — 80048 BASIC METABOLIC PNL TOTAL CA: CPT

## 2018-01-10 PROCEDURE — 6360000002 HC RX W HCPCS: Performed by: NURSE PRACTITIONER

## 2018-01-10 PROCEDURE — 36415 COLL VENOUS BLD VENIPUNCTURE: CPT

## 2018-01-10 RX ORDER — TRAMADOL HYDROCHLORIDE 50 MG/1
50 TABLET ORAL EVERY 6 HOURS PRN
Status: DISCONTINUED | OUTPATIENT
Start: 2018-01-10 | End: 2018-01-11

## 2018-01-10 RX ORDER — MIRTAZAPINE 15 MG/1
15 TABLET, FILM COATED ORAL NIGHTLY
Status: DISCONTINUED | OUTPATIENT
Start: 2018-01-10 | End: 2018-01-13 | Stop reason: HOSPADM

## 2018-01-10 RX ORDER — ACETAMINOPHEN 325 MG/1
650 TABLET ORAL EVERY 6 HOURS
Status: DISCONTINUED | OUTPATIENT
Start: 2018-01-10 | End: 2018-01-13 | Stop reason: HOSPADM

## 2018-01-10 RX ORDER — MIRTAZAPINE 15 MG/1
15 TABLET, FILM COATED ORAL NIGHTLY
Status: DISCONTINUED | OUTPATIENT
Start: 2018-01-11 | End: 2018-01-10

## 2018-01-10 RX ORDER — DIAZEPAM 2 MG/1
2 TABLET ORAL EVERY 8 HOURS PRN
Status: DISCONTINUED | OUTPATIENT
Start: 2018-01-10 | End: 2018-01-11

## 2018-01-10 RX ORDER — HYDROCODONE BITARTRATE AND ACETAMINOPHEN 5; 325 MG/1; MG/1
1 TABLET ORAL EVERY 6 HOURS PRN
Status: DISCONTINUED | OUTPATIENT
Start: 2018-01-10 | End: 2018-01-11

## 2018-01-10 RX ADMIN — POTASSIUM CHLORIDE 10 MEQ: 750 TABLET, FILM COATED, EXTENDED RELEASE ORAL at 21:40

## 2018-01-10 RX ADMIN — CARVEDILOL 25 MG: 25 TABLET, FILM COATED ORAL at 21:40

## 2018-01-10 RX ADMIN — DOCUSATE SODIUM 100 MG: 100 CAPSULE, LIQUID FILLED ORAL at 21:40

## 2018-01-10 RX ADMIN — DOCUSATE SODIUM AND SENNOSIDES 1 TABLET: 8.6; 5 TABLET, FILM COATED ORAL at 09:31

## 2018-01-10 RX ADMIN — ENOXAPARIN SODIUM 40 MG: 40 INJECTION, SOLUTION INTRAVENOUS; SUBCUTANEOUS at 09:31

## 2018-01-10 RX ADMIN — MIRTAZAPINE 15 MG: 15 TABLET, FILM COATED ORAL at 21:42

## 2018-01-10 RX ADMIN — LOSARTAN POTASSIUM 50 MG: 50 TABLET ORAL at 09:31

## 2018-01-10 RX ADMIN — WARFARIN SODIUM 2.5 MG: 2.5 TABLET ORAL at 17:33

## 2018-01-10 RX ADMIN — ACETAMINOPHEN 650 MG: 325 TABLET, FILM COATED ORAL at 17:33

## 2018-01-10 RX ADMIN — SODIUM CHLORIDE, PRESERVATIVE FREE 10 ML: 5 INJECTION INTRAVENOUS at 21:43

## 2018-01-10 RX ADMIN — CARVEDILOL 25 MG: 25 TABLET, FILM COATED ORAL at 09:31

## 2018-01-10 RX ADMIN — DOCUSATE SODIUM 100 MG: 100 CAPSULE, LIQUID FILLED ORAL at 09:31

## 2018-01-10 RX ADMIN — TRAMADOL HYDROCHLORIDE 50 MG: 50 TABLET, FILM COATED ORAL at 12:59

## 2018-01-10 RX ADMIN — VITAMIN D, TAB 1000IU (100/BT) 2000 UNITS: 25 TAB at 21:39

## 2018-01-10 RX ADMIN — VITAMIN D, TAB 1000IU (100/BT) 2000 UNITS: 25 TAB at 09:31

## 2018-01-10 RX ADMIN — ACETAMINOPHEN 650 MG: 325 TABLET, FILM COATED ORAL at 21:40

## 2018-01-10 RX ADMIN — ACETAMINOPHEN 650 MG: 325 TABLET, FILM COATED ORAL at 09:34

## 2018-01-10 RX ADMIN — POTASSIUM CHLORIDE 10 MEQ: 750 TABLET, FILM COATED, EXTENDED RELEASE ORAL at 09:31

## 2018-01-10 ASSESSMENT — PAIN SCALES - GENERAL
PAINLEVEL_OUTOF10: 0
PAINLEVEL_OUTOF10: 5
PAINLEVEL_OUTOF10: 5

## 2018-01-10 ASSESSMENT — ENCOUNTER SYMPTOMS
SHORTNESS OF BREATH: 0
VOMITING: 0
NAUSEA: 1

## 2018-01-10 NOTE — PROGRESS NOTES
OT per POC  [] D/C OT  [] Other:     Goals/Plan:   [x] Improve balance  [x] Improve Strength   [x] Improve [place] with functional transfers   [x]  Improve [place] with ADLs    Time In:  8:15  Time Out[de-identified]  9:18     Electronically signed by:     Allyn Santiago OT  1/10/2018, 9:40 AM  P

## 2018-01-10 NOTE — FLOWSHEET NOTE
Pt arrived to unit at 11:46 AM, assessment complete, pt alert and oriented x 4, denies any pain, denies nausea and vomiting, oriented to room and call system, bed alarm engaged, bed in lowest position, call light within reach, will continue to monitor.   Electronically signed by Rocio Shook RN on 1/9/2018 at 12:00 PM

## 2018-01-10 NOTE — FLOWSHEET NOTE
Patient woke up disoriented, nurse able to reorient patient. Neuro assessment completed. PERRLA. Equal bilateral strength. Weakness on LLE from surgery. Speech clear. Will continue to monitor.

## 2018-01-10 NOTE — PROGRESS NOTES
Elenita Cummings is a 80 y.o. female patient. Status post surgery left knee secondary to degenerative joint disease, we have been consulted to follow the patient medically.     Current Facility-Administered Medications   Medication Dose Route Frequency Provider Last Rate Last Dose    acetaminophen (TYLENOL) tablet 650 mg  650 mg Oral Q6H Robbert Lush, CNP        traMADol (ULTRAM) tablet 50 mg  50 mg Oral Q6H PRN Robbert Lush, CNP        HYDROcodone-acetaminophen (NORCO) 5-325 MG per tablet 1 tablet  1 tablet Oral Q6H PRN Robbert Lush, CNP        diazepam (VALIUM) tablet 2 mg  2 mg Oral Q8H PRN Magaly Estevezsh, CNP        0.9 % sodium chloride infusion   Intravenous Continuous Олег Singh MD 50 mL/hr at 01/09/18 1351      sodium chloride flush 0.9 % injection 10 mL  10 mL Intravenous 2 times per day Олег Singh MD        sodium chloride flush 0.9 % injection 10 mL  10 mL Intravenous PRN Олег Singh MD        docusate sodium (COLACE) capsule 100 mg  100 mg Oral BID Олег Singh MD   100 mg at 01/09/18 2059    magnesium hydroxide (MILK OF MAGNESIA) 400 MG/5ML suspension 30 mL  30 mL Oral Daily PRN Олег Singh MD        bisacodyl (DULCOLAX) suppository 10 mg  10 mg Rectal Daily PRN Олег Singh MD        sennosides-docusate sodium (SENOKOT-S) 8.6-50 MG tablet 1 tablet  1 tablet Oral Daily Олег Singh MD   1 tablet at 01/09/18 1351    ondansetron (ZOFRAN) injection 4 mg  4 mg Intravenous Q6H PRN Олег Singh MD        carvedilol (COREG) tablet 25 mg  25 mg Oral BID Brian Paredes MD   25 mg at 01/09/18 2059    vitamin D (CHOLECALCIFEROL) tablet 2,000 Units  2,000 Units Oral BID Brian Paredes MD   2,000 Units at 01/09/18 2059    clonazePAM (KLONOPIN) tablet 0.5 mg  0.5 mg Oral BID PRN Brian Paredes MD        losartan (COZAAR) tablet 50 mg  50 mg Oral Daily Brian Paredes MD   50 mg at 01/09/18 1351    mirtazapine (REMERON) tablet 30 mg  30 mg

## 2018-01-10 NOTE — PROGRESS NOTES
Physical Therapy  Facility/Department: [facility] N229/N229-01  Daily Progress Note    NAME: Ty Grigsby    : 1929 (80 y.o.)  MRN: [id number]    Account: [de-identified]  Gender: female    Date of Service: 01/10/18    Patient Diagnosis(es):   Patient Active Problem List    Diagnosis Date Noted    Anemia 01/10/2018    S/P total knee replacement using cement, left 2018    Degenerative joint disease of knee, left 2018    PAF (paroxysmal atrial fibrillation) ([facility] [address] 2017    Anxiety state 2012    HTN (hypertension) 2011    Hyperlipidemia 2011       Precautions/Weight Bearing Restrictions: Weight bearing as tolerated, POD #1 L TKA - Immob L knee at 30* while in bed for comfort     Falls Safety Armband Present:  [x] Yes  [] No    SUBJECTIVE: I'm doing good, just don;t give me percocet.    Pain:   Initial Pain level: none     Reassessment of Pain: none     OBJECTIVE:     Bed Mobility:   Rolling: - Stand by Assist  Supine to Sit: -  Stand by Assist   Sit to Supine: -  Stand by Assist    Good control of L leg   vc's for technique     Transfers: @WW  Sit to Stand: - Stand by Assist   Stand to Sit: - Stand by Assist   vc's for technique and safety   vc's for hand placement   Slightly impulsive    Gait/Ambulation:   Assistive Device: Walker  Assist Level: Contact Guard Assist  Distance: 40'x2  Surface: linoleum/hardwood  Gait Deviations: Step too gait, slow paced, difficulty with standard walker, downward gaze; vc's to improve reciprocally     Exercise:  Supine - SLR x 5     Plan   Plan  Times per week: 5-7  Times per day: Twice a day  Current Treatment Recommendations: Strengthening, Balance Training, Transfer Training, Functional Mobility Training, IADL Training, Neuromuscular Re-education, Home Exercise Program, Safety Education & Training, Patient/Caregiver Education & Training, Equipment Evaluation, Education, & procurement, Endurance Training, Gait Training, Stair training    Goals  Short term goals  Short term goal 1: Pt will ascend/descend 4 steps with B HR and supervision  Short term goal 2: Pt will amb 48' x2 with 2WW and mod I  Short term goal 3: Pt will complete transfers with mod I  Short term goal 4: Pt will complete bed mobility with indep  Short term goal 5: Pt will be indep with HEP     Comments: Order for pillow under her knee for comfort d/t the extensive repair they had to do on her knee. Ok to work on extension. Pt had + SLR x 5 no immob needed. WW put in her room for PM Tx.      Therapy Time   Individual   Time In 0920   Time Out 0958   Minutes 38     Gait - 20 mins  Trsf - 8 mins  bm - 10 mins    Electronically signed by Chelo Verduzco PTA on 1/10/2018 at 10:05 AM

## 2018-01-10 NOTE — PROGRESS NOTES
per week: 5-7  Times per day: Twice a day  Current Treatment Recommendations: Strengthening, Balance Training, Transfer Training, Functional Mobility Training, IADL Training, Neuromuscular Re-education, Home Exercise Program, Safety Education & Training, Patient/Caregiver Education & Training, Equipment Evaluation, Education, & procurement, Endurance Training, Gait Training, Stair training    Goals  Short term goals  Short term goal 1: Pt will ascend/descend 4 steps with B HR and supervision  Short term goal 2: Pt will amb 48' x2 with 2WW and mod I  Short term goal 3: Pt will complete transfers with mod I  Short term goal 4: Pt will complete bed mobility with indep  Short term goal 5: Pt will be indep with HEP     Comments: Pt is slightly impulsive. Good participation.  Pt up in chair with call button within reach and chair alarm activated      Therapy Time   Individual   Time In 1310   Time Out 1333   Minutes 23     Gait - 15 mins  Therex 8 mins    Electronically signed by Macario Moser PTA on 1/10/2018 at 1:33 PM

## 2018-01-11 LAB
HCT VFR BLD CALC: 36.2 % (ref 37–47)
HEMOGLOBIN: 11.5 G/DL (ref 12–16)
INR BLD: 1.2
MCH RBC QN AUTO: 28.9 PG (ref 27–31.3)
MCHC RBC AUTO-ENTMCNC: 31.9 % (ref 33–37)
MCV RBC AUTO: 90.8 FL (ref 82–100)
PDW BLD-RTO: 15.3 % (ref 11.5–14.5)
PLATELET # BLD: 232 K/UL (ref 130–400)
PROTHROMBIN TIME: 13.1 SEC (ref 8.1–13.7)
RBC # BLD: 3.98 M/UL (ref 4.2–5.4)
WBC # BLD: 7 K/UL (ref 4.8–10.8)

## 2018-01-11 PROCEDURE — 6370000000 HC RX 637 (ALT 250 FOR IP): Performed by: NURSE PRACTITIONER

## 2018-01-11 PROCEDURE — 6360000002 HC RX W HCPCS: Performed by: NURSE PRACTITIONER

## 2018-01-11 PROCEDURE — 6370000000 HC RX 637 (ALT 250 FOR IP): Performed by: INTERNAL MEDICINE

## 2018-01-11 PROCEDURE — 97116 GAIT TRAINING THERAPY: CPT

## 2018-01-11 PROCEDURE — 1210000000 HC MED SURG R&B

## 2018-01-11 PROCEDURE — 36415 COLL VENOUS BLD VENIPUNCTURE: CPT

## 2018-01-11 PROCEDURE — 2580000003 HC RX 258: Performed by: ORTHOPAEDIC SURGERY

## 2018-01-11 PROCEDURE — 85610 PROTHROMBIN TIME: CPT

## 2018-01-11 PROCEDURE — 85027 COMPLETE CBC AUTOMATED: CPT

## 2018-01-11 PROCEDURE — 97110 THERAPEUTIC EXERCISES: CPT

## 2018-01-11 PROCEDURE — 6370000000 HC RX 637 (ALT 250 FOR IP): Performed by: ORTHOPAEDIC SURGERY

## 2018-01-11 RX ORDER — ASPIRIN 81 MG/1
81 TABLET ORAL 2 TIMES DAILY
Qty: 10 TABLET | Refills: 0 | Status: ON HOLD | OUTPATIENT
Start: 2018-01-11 | End: 2020-06-09

## 2018-01-11 RX ORDER — ACETAMINOPHEN 80 MG
TABLET,CHEWABLE ORAL ONCE
Status: COMPLETED | OUTPATIENT
Start: 2018-01-11 | End: 2018-01-11

## 2018-01-11 RX ORDER — OXYCODONE HYDROCHLORIDE 5 MG/1
2.5 TABLET ORAL EVERY 6 HOURS PRN
Status: DISCONTINUED | OUTPATIENT
Start: 2018-01-11 | End: 2018-01-13 | Stop reason: HOSPADM

## 2018-01-11 RX ADMIN — VITAMIN D, TAB 1000IU (100/BT) 2000 UNITS: 25 TAB at 20:47

## 2018-01-11 RX ADMIN — POTASSIUM CHLORIDE 10 MEQ: 750 TABLET, FILM COATED, EXTENDED RELEASE ORAL at 09:47

## 2018-01-11 RX ADMIN — SODIUM CHLORIDE, PRESERVATIVE FREE 10 ML: 5 INJECTION INTRAVENOUS at 09:48

## 2018-01-11 RX ADMIN — MIRTAZAPINE 15 MG: 15 TABLET, FILM COATED ORAL at 20:47

## 2018-01-11 RX ADMIN — LOSARTAN POTASSIUM 50 MG: 50 TABLET ORAL at 09:47

## 2018-01-11 RX ADMIN — DOCUSATE SODIUM AND SENNOSIDES 1 TABLET: 8.6; 5 TABLET, FILM COATED ORAL at 09:47

## 2018-01-11 RX ADMIN — POTASSIUM CHLORIDE 10 MEQ: 750 TABLET, FILM COATED, EXTENDED RELEASE ORAL at 20:47

## 2018-01-11 RX ADMIN — CARVEDILOL 25 MG: 25 TABLET, FILM COATED ORAL at 09:46

## 2018-01-11 RX ADMIN — ACETAMINOPHEN 650 MG: 325 TABLET, FILM COATED ORAL at 12:39

## 2018-01-11 RX ADMIN — ACETAMINOPHEN 650 MG: 325 TABLET, FILM COATED ORAL at 17:39

## 2018-01-11 RX ADMIN — CARVEDILOL 25 MG: 25 TABLET, FILM COATED ORAL at 20:47

## 2018-01-11 RX ADMIN — Medication: at 09:48

## 2018-01-11 RX ADMIN — ENOXAPARIN SODIUM 40 MG: 40 INJECTION, SOLUTION INTRAVENOUS; SUBCUTANEOUS at 09:47

## 2018-01-11 RX ADMIN — WARFARIN SODIUM 2.5 MG: 2.5 TABLET ORAL at 17:39

## 2018-01-11 RX ADMIN — DOCUSATE SODIUM 100 MG: 100 CAPSULE, LIQUID FILLED ORAL at 09:47

## 2018-01-11 RX ADMIN — ACETAMINOPHEN 650 MG: 325 TABLET, FILM COATED ORAL at 05:28

## 2018-01-11 RX ADMIN — SODIUM CHLORIDE, PRESERVATIVE FREE 10 ML: 5 INJECTION INTRAVENOUS at 20:55

## 2018-01-11 RX ADMIN — VITAMIN D, TAB 1000IU (100/BT) 2000 UNITS: 25 TAB at 09:47

## 2018-01-11 ASSESSMENT — PAIN DESCRIPTION - PAIN TYPE
TYPE: SURGICAL PAIN
TYPE: SURGICAL PAIN

## 2018-01-11 ASSESSMENT — PAIN DESCRIPTION - ORIENTATION
ORIENTATION: LEFT
ORIENTATION: LEFT

## 2018-01-11 ASSESSMENT — PAIN DESCRIPTION - DESCRIPTORS
DESCRIPTORS: ACHING
DESCRIPTORS: ACHING

## 2018-01-11 ASSESSMENT — PAIN SCALES - GENERAL
PAINLEVEL_OUTOF10: 3
PAINLEVEL_OUTOF10: 4

## 2018-01-11 ASSESSMENT — PAIN DESCRIPTION - LOCATION
LOCATION: KNEE
LOCATION: KNEE

## 2018-01-11 ASSESSMENT — ENCOUNTER SYMPTOMS
VOMITING: 0
NAUSEA: 0
SHORTNESS OF BREATH: 0

## 2018-01-11 NOTE — FLOWSHEET NOTE
Assisted patient to bathroom, patient tolerated well. Patient states she remembers episode of confusion. Neuro assessment completed. Patient oriented to self, and place, disoriented to time. Forgetful at times. PERRLA, speech clear, bilateral equal strength. Will continue to monitor.

## 2018-01-11 NOTE — PROGRESS NOTES
Gustavo Otoole is a 80 y.o. female patient. Status post surgery left knee secondary to degenerative joint disease, we have been consulted to follow the patient medically.     Current Facility-Administered Medications   Medication Dose Route Frequency Provider Last Rate Last Dose    oxyCODONE (ROXICODONE) immediate release tablet 2.5 mg  2.5 mg Oral Q6H PRN Teresita Shelter, CNP        pill splitter   Does not apply Once Teresita Shelter, CNP        acetaminophen (TYLENOL) tablet 650 mg  650 mg Oral Q6H Teresita Shelter, CNP   650 mg at 01/11/18 0528    mirtazapine (REMERON) tablet 15 mg  15 mg Oral Nightly Sandy Paradise, DO   15 mg at 01/10/18 2142    sodium chloride flush 0.9 % injection 10 mL  10 mL Intravenous 2 times per day Lux Goodman MD        sodium chloride flush 0.9 % injection 10 mL  10 mL Intravenous PRN Lux Goodman MD   10 mL at 01/10/18 2143    docusate sodium (COLACE) capsule 100 mg  100 mg Oral BID Lux Goodman MD   100 mg at 01/10/18 2140    magnesium hydroxide (MILK OF MAGNESIA) 400 MG/5ML suspension 30 mL  30 mL Oral Daily PRN Lux Goodman MD        bisacodyl (DULCOLAX) suppository 10 mg  10 mg Rectal Daily PRN Lux Goodman MD        sennosides-docusate sodium (SENOKOT-S) 8.6-50 MG tablet 1 tablet  1 tablet Oral Daily Lux Goodman MD   1 tablet at 01/10/18 0931    ondansetron (ZOFRAN) injection 4 mg  4 mg Intravenous Q6H PRN Lux Goodman MD        carvedilol (COREG) tablet 25 mg  25 mg Oral BID Opal Cheung MD   25 mg at 01/10/18 2140    vitamin D (CHOLECALCIFEROL) tablet 2,000 Units  2,000 Units Oral BID Opal Cheung MD   2,000 Units at 01/10/18 2139    clonazePAM (KLONOPIN) tablet 0.5 mg  0.5 mg Oral BID PRN Opal Cheung MD        losartan (COZAAR) tablet 50 mg  50 mg Oral Daily Opal Cheung MD   50 mg at 01/10/18 0931    acetaminophen (TYLENOL) tablet 650 mg  650 mg Oral Q4H PRN Opal Cheung MD        hydrALAZINE Assessment:    Condition: In stable condition. (Metabolic encephalopathy  Anemia, postop  Hypertension  Atrial fibrillation  Hyperlipidemia  Status post surgery left knee secondary to degenerative joint disease). Plan:   (Patient's pain is stable, blood count stable, still having some sense of confusion at night, however patient remembers episodes. Plan is to discharge home with home health care when able).        Tyler Tolbert PA-C  1/11/2018

## 2018-01-11 NOTE — CARE COORDINATION
Transfer To Home Care    N229/N229-  Patient Name: Maya Noonan         Address: [address] [place] [id number]       . MRN: [id number]                          : 1929  (80 y.o.)       Phone:     [phone] (home)   Gender: female   Demographics Verified:  [x]Yes   []No                                                       SSN:        Diagnosis:    S/P LT TOTAL KNEE ARTHROPLASTY  HX: Had some metabolic encephalopathy last night. Better tosday. metabolic encephalopathy secondary to Percocet. May be discharged. Patient Active Problem List   Diagnosis    Degenerative joint disease of knee, left    S/P total knee replacement using cement, left    Anxiety state    HTN (hypertension)    Hyperlipidemia    PAF (paroxysmal atrial fibrillation) (HCC)    Anemia      18 Hgb: 11.5, Hct: 36.2       Code Status: Resuscitation/Code Status Note on Maya Noonan  The code status was made Full Code     High Risk For Readmission:  [x]Yes   []No             [x]  F2F Completed   Ordering Physician: Alfa Mike CNP)  PCP: Gabino Pradhan MD   Anticipated Discharge Date: (18 or 18 - They are trying to get her pain under control. She is currently only using Tylenol.  She had confusion with Percocet and Tramadol. )    [address] Coordin[address]ior     []Yes   [x]No  Signed up for Palliative Care Services   []Yes   [x]No  (Ask for Palliative Care)     [x]  Flu Vaccine     Date:    (10/30/2017)   [x]  PNEUMONIA   Date:    (2016)                           IV ACCESS - NONE    []  Medication   ()   []  Last Dose    ()           Next Dose ()        Line Type                      Insert Date ()  IV: Peripheral:  []Yes   [x]No   PICC:      []Yes   [x]No    Central       []Yes   [x]No   Length: ()  Anatomical Location ()  Site Change: ()  Supplier/Contact #: ()       GENITOURINARY () - NONE                                  []   Catheter Size:                  () []   Last Change:                   ()   []   Frequency of Change:    ()   []   Type:                                 ()   []    Dialysis Days:                 ()   []    Provider:                          ()    GASTROENTEROLOGY (GI) - NONE      []    Alteration Colostomy/Ileostomy   []    Last changed: ()                 Due:   ()   []    Tube Feedings:              ()   []    NG Tube        []  Peg Tube     []  Drsg Change: ()   []    Due/Frequency:              ()      Supplier/Contact #         ()          HOME CARE SERVICES       [x]   Skilled Nursing       [x]  Med/Surg       [] Mental Health   [x]   Physical Therapy    [x]  Home Safety Eval    []  Fast track  DME NEEDS:      [x]   Occupational Therapy     []  Cognition and Memory  DME NEEDS:      []  Speech therapy        []  Swallow Eval & Treat    []  Cognition   []  Home Care Aide  Medications that need SN teaching: Klonopin, Lovenox, Remeron, Coumadin          [] Community Resource Linkage       [] Supportive Counseling :    [] Short/Long Term Placement           []  Application F/U:  [] Medicaid  [] Passport      OXYGEN -NONE                                 [] Home O2     Liters per NC  ()   [] Intermittent/Continuous   [] Bipap            [] CPap   [] Ventilator     [] Nebulizer  Supplier/Contact # :    ()        WOUND CARE                                 Wound Care / Ino Lou / Addie Lynch / Greg Tubbs / Padmini Schmitz / [facility]   Type:  (Surgery Incision Lt Knee  Full weight bearing on operative leg   please do NOT bend pass 90 degrees and keep knee bend at 30 degrees when in bed    Gradual Compression Stockings : On at 6 am off at 6 pm. Start with unilateral- once surgical dressing removed- apply bilaterally    Knee Immobilizer - D/c when able to straight leg raise.  Send immobilizer home with patient    1) Check operative dressing with vital signs. 2)  If aquacell in place then leave for 7 days unless heavily saturated.  If heavily saturated then

## 2018-01-11 NOTE — PROGRESS NOTES
Progress Note   TKA    Subjective:     Post-Operative Day: 2 Status Post left Total Knee Arthroplasty  Systemic or Specific Complaints:No Complaints    Objective:     CURRENT VITALS:  /67   Pulse 86   Temp 97.9 °F (36.6 °C) (Oral)   Resp 18   Ht 5' 3.5\" (1.613 m)   Wt 180 lb 12.8 oz (82 kg)   SpO2 96%   BMI 31.52 kg/m²     General: alert, appears stated age and cooperative   Wound: Wound clean and dry no evidence of infection. Aquacell on- clean   Motion: Painless Range of Motion   DVT Exam: No evidence of DVT seen on physical exam.       Knee swollen but thigh soft to palpation. Moving foot and ankle. Good distal pulses. Data Review  Recent Labs      01/10/18   0551  01/11/18   0534   WBC  8.5  7.0   RBC  3.95*  3.98*   HGB  11.7*  11.5*   HCT  35.4*  36.2*   MCV  89.8  90.8   MCH  29.6  28.9   MCHC  32.9*  31.9*   RDW  14.8*  15.3*   PLT  216  232         Assessment:     Status Post left Total Knee Arthroplasty. Doing well postoperatively. Pt does have increased confusion in evening and night. Medical service aware.  Will stop all narcotics in case it is increasing delirium     Plan:      1: Continues current post-op course : will monitor today to see if still safe for d/c today  2:  Continue Deep venous thrombosis prophylaxis  3:  Continue physical therapy  4:  Continue Pain Control

## 2018-01-11 NOTE — PROGRESS NOTES
Safety Education & Training, Patient/Caregiver Education & Training, Equipment Evaluation, Education, & procurement, Endurance Training, Gait Training, Stair training  Safety Devices  Type of devices: Bed alarm in place, Call light within reach     Therapy Time   Individual   Time In 0911   Time Out 0940   Minutes 29     Timed Code Treatment Minutes: 29 Minutes   gait 15   CORBY 10   Tr 4     Emma Becker PTA, 01/11/18 at 9:44 AM

## 2018-01-11 NOTE — PROGRESS NOTES
Physical Therapy Med Surg Daily Treatment Note  Facility/Department: [facility]  Room: N229/N229-01       NAME: Tiffany Pedroza  : 1929 (80 y.o.)  MRN: [id number]  CODE STATUS: Full Code    Date of Service: 2018    Patient Diagnosis(es): S/P total knee replacement using cement, left [Z96.652]   No chief complaint on file.     Patient Active Problem List    Diagnosis Date Noted    Anemia 01/10/2018    S/P total knee replacement using cement, left 2018    Degenerative joint disease of knee, left 2018    PAF (paroxysmal atrial fibrillation) ([address] 2017    Anxiety state 2012    HTN (hypertension) 2011    Hyperlipidemia 2011        Past Medical History:   Diagnosis Date    Arthritis     Disease of blood and blood forming organ     DVT (deep vein thrombosis) in pregnancy ([address]     History of blood transfusion     Hx of blood clots     Pulmonary    Hyperlipidemia 2011    Hypertension     PONV (postoperative nausea and vomiting)     vomiting    Presence of IVC filter     Pulmonary embolism (HCC)     Wrist fracture, right      Past Surgical History:   Procedure Laterality Date    APPENDECTOMY      CATARACT REMOVAL Bilateral     CHOLECYSTECTOMY      EYE SURGERY      [employer] bilateral implants    HEMORRHOID SURGERY      HYSTERECTOMY      KNEE SURGERY Right     LA TOTAL KNEE ARTHROPLASTY Left 2018    LT KNEE TOTAL ARTHROPLASTY SUPINE, TANESHA PSI, AM ADMIT, SPINAL performed by Baldemar Perera MD at [facility] Right          Restrictions  Required Braces or Orthoses  Left Lower Extremity Brace: Knee Brace  LLE Brace Type: immobilizer until active SLR  Position Activity Restriction  Other position/activity restrictions: please do NOT bend pass 90 degrees and keep knee bend at 30 degrees when in bed per ortho    Subjective   General  Chart Reviewed: Yes  Family / Caregiver Present: No  Pain Screening  Patient Currently in

## 2018-01-12 ENCOUNTER — APPOINTMENT (OUTPATIENT)
Dept: ULTRASOUND IMAGING | Age: 83
DRG: 469 | End: 2018-01-12
Attending: ORTHOPAEDIC SURGERY
Payer: MEDICARE

## 2018-01-12 LAB
ANION GAP SERPL CALCULATED.3IONS-SCNC: 11 MEQ/L (ref 7–13)
BUN BLDV-MCNC: 13 MG/DL (ref 8–23)
CALCIUM SERPL-MCNC: 8.4 MG/DL (ref 8.6–10.2)
CHLORIDE BLD-SCNC: 107 MEQ/L (ref 98–107)
CO2: 26 MEQ/L (ref 22–29)
CREAT SERPL-MCNC: 0.62 MG/DL (ref 0.5–0.9)
GFR AFRICAN AMERICAN: >60
GFR NON-AFRICAN AMERICAN: >60
GLUCOSE BLD-MCNC: 88 MG/DL (ref 74–109)
HCT VFR BLD CALC: 33.7 % (ref 37–47)
HEMOGLOBIN: 11.1 G/DL (ref 12–16)
INR BLD: 1.4
MCH RBC QN AUTO: 29.2 PG (ref 27–31.3)
MCHC RBC AUTO-ENTMCNC: 32.9 % (ref 33–37)
MCV RBC AUTO: 89 FL (ref 82–100)
PDW BLD-RTO: 14.8 % (ref 11.5–14.5)
PLATELET # BLD: 223 K/UL (ref 130–400)
POTASSIUM SERPL-SCNC: 4 MEQ/L (ref 3.5–5.1)
PROTHROMBIN TIME: 14.3 SEC (ref 8.1–13.7)
RBC # BLD: 3.79 M/UL (ref 4.2–5.4)
SODIUM BLD-SCNC: 144 MEQ/L (ref 132–144)
WBC # BLD: 6.5 K/UL (ref 4.8–10.8)

## 2018-01-12 PROCEDURE — 6370000000 HC RX 637 (ALT 250 FOR IP): Performed by: INTERNAL MEDICINE

## 2018-01-12 PROCEDURE — 1210000000 HC MED SURG R&B

## 2018-01-12 PROCEDURE — 6370000000 HC RX 637 (ALT 250 FOR IP): Performed by: NURSE PRACTITIONER

## 2018-01-12 PROCEDURE — 2580000003 HC RX 258: Performed by: ORTHOPAEDIC SURGERY

## 2018-01-12 PROCEDURE — 85027 COMPLETE CBC AUTOMATED: CPT

## 2018-01-12 PROCEDURE — 80048 BASIC METABOLIC PNL TOTAL CA: CPT

## 2018-01-12 PROCEDURE — 6370000000 HC RX 637 (ALT 250 FOR IP): Performed by: ORTHOPAEDIC SURGERY

## 2018-01-12 PROCEDURE — 85610 PROTHROMBIN TIME: CPT

## 2018-01-12 PROCEDURE — 93971 EXTREMITY STUDY: CPT

## 2018-01-12 PROCEDURE — 36415 COLL VENOUS BLD VENIPUNCTURE: CPT

## 2018-01-12 PROCEDURE — 97116 GAIT TRAINING THERAPY: CPT

## 2018-01-12 RX ORDER — WARFARIN SODIUM 2.5 MG/1
2.5 TABLET ORAL DAILY
Status: DISCONTINUED | OUTPATIENT
Start: 2018-01-14 | End: 2018-01-13

## 2018-01-12 RX ORDER — CLONAZEPAM 0.5 MG/1
0.5 TABLET ORAL 2 TIMES DAILY PRN
Qty: 4 TABLET | Refills: 0 | Status: ON HOLD | OUTPATIENT
Start: 2018-01-12 | End: 2020-07-13 | Stop reason: HOSPADM

## 2018-01-12 RX ADMIN — ACETAMINOPHEN 650 MG: 325 TABLET, FILM COATED ORAL at 06:23

## 2018-01-12 RX ADMIN — ACETAMINOPHEN 650 MG: 325 TABLET, FILM COATED ORAL at 14:41

## 2018-01-12 RX ADMIN — CARVEDILOL 25 MG: 25 TABLET, FILM COATED ORAL at 10:05

## 2018-01-12 RX ADMIN — MIRTAZAPINE 15 MG: 15 TABLET, FILM COATED ORAL at 21:51

## 2018-01-12 RX ADMIN — LOSARTAN POTASSIUM 50 MG: 50 TABLET ORAL at 10:06

## 2018-01-12 RX ADMIN — ACETAMINOPHEN 650 MG: 325 TABLET, FILM COATED ORAL at 21:51

## 2018-01-12 RX ADMIN — POTASSIUM CHLORIDE 10 MEQ: 750 TABLET, FILM COATED, EXTENDED RELEASE ORAL at 10:05

## 2018-01-12 RX ADMIN — POTASSIUM CHLORIDE 10 MEQ: 750 TABLET, FILM COATED, EXTENDED RELEASE ORAL at 21:52

## 2018-01-12 RX ADMIN — VITAMIN D, TAB 1000IU (100/BT) 2000 UNITS: 25 TAB at 10:06

## 2018-01-12 RX ADMIN — SODIUM CHLORIDE, PRESERVATIVE FREE 10 ML: 5 INJECTION INTRAVENOUS at 10:08

## 2018-01-12 RX ADMIN — VITAMIN D, TAB 1000IU (100/BT) 2000 UNITS: 25 TAB at 21:51

## 2018-01-12 RX ADMIN — CARVEDILOL 25 MG: 25 TABLET, FILM COATED ORAL at 21:51

## 2018-01-12 ASSESSMENT — PAIN DESCRIPTION - DESCRIPTORS
DESCRIPTORS: ACHING
DESCRIPTORS: ACHING

## 2018-01-12 ASSESSMENT — ENCOUNTER SYMPTOMS
VOMITING: 0
SHORTNESS OF BREATH: 0
NAUSEA: 0

## 2018-01-12 ASSESSMENT — PAIN SCALES - GENERAL
PAINLEVEL_OUTOF10: 1
PAINLEVEL_OUTOF10: 3
PAINLEVEL_OUTOF10: 1
PAINLEVEL_OUTOF10: 1
PAINLEVEL_OUTOF10: 3
PAINLEVEL_OUTOF10: 4

## 2018-01-12 NOTE — PROGRESS NOTES
Q4H PRN Daryle Azure, MD        hydrALAZINE (APRESOLINE) injection 20 mg  20 mg Intravenous Q4H PRN Daryle Azure, MD        potassium chloride (KLOR-CON) extended release tablet 10 mEq  10 mEq Oral BID Mamie Wright MD   10 mEq at 01/11/18 2047     Allergies   Allergen Reactions    Celebrex [Celecoxib]      Rash    Cymbalta [Duloxetine Hcl]      nausea    Diltiazem      Unknown    Lisinopril      Unknown     Principal Problem:    S/P total knee replacement using cement, left  Active Problems:    Degenerative joint disease of knee, left    Anxiety state    HTN (hypertension)    Hyperlipidemia    PAF (paroxysmal atrial fibrillation) (HCC)    Anemia    Blood pressure (!) 156/87, pulse 77, temperature 97.9 °F (36.6 °C), temperature source Oral, resp. rate 18, height 5' 3.5\" (1.613 m), weight 180 lb 12.8 oz (82 kg), SpO2 95 %. Subjective:  Symptoms:  Stable. No shortness of breath, malaise, chest pain, weakness or anxiety. (Was confused during the night much better this morning). Diet:  Adequate intake. No nausea or vomiting. Activity level: Impaired due to weakness. Objective:  General Appearance: In no acute distress. Vital signs: (most recent): Blood pressure (!) 156/87, pulse 77, temperature 97.9 °F (36.6 °C), temperature source Oral, resp. rate 18, height 5' 3.5\" (1.613 m), weight 180 lb 12.8 oz (82 kg), SpO2 95 %. Vital signs are normal.  No fever. Output: Producing urine. HEENT: Normal HEENT exam.    Lungs:  Normal effort and normal respiratory rate. Heart: Normal rate. Irregular rhythm. Abdomen: Abdomen is soft. There is no abdominal tenderness. Extremities: Decreased range of motion. (Left leg edema noted)  Neurological: Patient is alert and oriented to person, place and time. Pupils:  Pupils are equal, round, and reactive to light. Skin:  Warm and dry. Assessment:    Condition: In stable condition.    (Metabolic encephalopathy due to

## 2018-01-12 NOTE — PROGRESS NOTES
Progress Note   TKA    Subjective:     Post-Operative Day: 3 Status Post left Total Knee Arthroplasty  Systemic or Specific Complaints:No Complaints and numbness on top of food    Objective:     CURRENT VITALS:  BP (!) 142/62   Pulse 85   Temp 97.9 °F (36.6 °C) (Oral)   Resp 18   Ht 5' 3.5\" (1.613 m)   Wt 180 lb 12.8 oz (82 kg)   SpO2 94%   BMI 31.52 kg/m²     General: alert, appears stated age and cooperative   Wound: Wound clean and dry no evidence of infection. Aquacell clean and intact   Motion: Painless Range of Motion   DVT Exam: Pt does have increased swelling the left leg- there is also increased btruising       Knee swollen but thigh soft to palpation. Moving foot and ankle. Good distal pulses. Data Review  Recent Labs      01/10/18   0551  01/11/18   0534  01/12/18   0538   WBC  8.5  7.0  6.5   RBC  3.95*  3.98*  3.79*   HGB  11.7*  11.5*  11.1*   HCT  35.4*  36.2*  33.7*   MCV  89.8  90.8  89.0   MCH  29.6  28.9  29.2   MCHC  32.9*  31.9*  32.9*   RDW  14.8*  15.3*  14.8*   PLT  216  232  223         Assessment:     Status Post left Total Knee Arthroplasty. Doing well postoperatively. Will obtain ultrasound to r/o DVT as patient has hx of blood clots.  Pt is on coumadin- might have to hold a dose- as patient is only 1.4 INR- she has been on Lovenox- but will hold morning dose-d/t bruising  Pt has increasing edema and swelling on dorsal side of foot, pt is able to doriflex and there is no foot drop at this time   pt does have positive straight leg raise- and she is only on tylenol for pain    Plan:      1: Continues current post-op course :  2:  Continue Deep venous thrombosis prophylaxis  3:  Continue physical therapy  4:  Continue Pain Control

## 2018-01-12 NOTE — PROGRESS NOTES
training  Plan Comment: gave supine/seated HEP handout, please review in AM   Safety Devices  Type of devices: Bed alarm in place, Call light within reach     Therapy Time   Individual   Time In 0911   Time Out 0926   Minutes 15     Timed Code Treatment Minutes: 1100 [facility], [facility], 01/12/18 at 9:33 AM

## 2018-01-12 NOTE — CARE COORDINATION
LSW spoke with pt and she is thinking that she may need to have a SNF stay . She would like [employer] and info was faxed to Jason Bedolla to start a precert.

## 2018-01-13 VITALS
DIASTOLIC BLOOD PRESSURE: 60 MMHG | TEMPERATURE: 97.7 F | HEART RATE: 72 BPM | RESPIRATION RATE: 16 BRPM | SYSTOLIC BLOOD PRESSURE: 148 MMHG | HEIGHT: 64 IN | BODY MASS INDEX: 30.87 KG/M2 | WEIGHT: 180.8 LBS | OXYGEN SATURATION: 96 %

## 2018-01-13 LAB
INR BLD: 1.4
PROTHROMBIN TIME: 14.5 SEC (ref 8.1–13.7)

## 2018-01-13 PROCEDURE — 2580000003 HC RX 258: Performed by: ORTHOPAEDIC SURGERY

## 2018-01-13 PROCEDURE — 97116 GAIT TRAINING THERAPY: CPT

## 2018-01-13 PROCEDURE — 97110 THERAPEUTIC EXERCISES: CPT

## 2018-01-13 PROCEDURE — 6370000000 HC RX 637 (ALT 250 FOR IP): Performed by: INTERNAL MEDICINE

## 2018-01-13 PROCEDURE — 85610 PROTHROMBIN TIME: CPT

## 2018-01-13 PROCEDURE — 6370000000 HC RX 637 (ALT 250 FOR IP): Performed by: NURSE PRACTITIONER

## 2018-01-13 PROCEDURE — 36415 COLL VENOUS BLD VENIPUNCTURE: CPT

## 2018-01-13 PROCEDURE — 6370000000 HC RX 637 (ALT 250 FOR IP): Performed by: ORTHOPAEDIC SURGERY

## 2018-01-13 RX ORDER — WARFARIN SODIUM 2.5 MG/1
2.5 TABLET ORAL DAILY
Status: DISCONTINUED | OUTPATIENT
Start: 2018-01-13 | End: 2018-01-13 | Stop reason: HOSPADM

## 2018-01-13 RX ADMIN — SODIUM CHLORIDE, PRESERVATIVE FREE 10 ML: 5 INJECTION INTRAVENOUS at 08:55

## 2018-01-13 RX ADMIN — VITAMIN D, TAB 1000IU (100/BT) 2000 UNITS: 25 TAB at 08:53

## 2018-01-13 RX ADMIN — ACETAMINOPHEN 650 MG: 325 TABLET, FILM COATED ORAL at 06:06

## 2018-01-13 RX ADMIN — POTASSIUM CHLORIDE 10 MEQ: 750 TABLET, FILM COATED, EXTENDED RELEASE ORAL at 08:53

## 2018-01-13 RX ADMIN — CARVEDILOL 25 MG: 25 TABLET, FILM COATED ORAL at 08:52

## 2018-01-13 RX ADMIN — ACETAMINOPHEN 650 MG: 325 TABLET, FILM COATED ORAL at 11:57

## 2018-01-13 RX ADMIN — LOSARTAN POTASSIUM 50 MG: 50 TABLET ORAL at 08:53

## 2018-01-13 ASSESSMENT — PAIN SCALES - GENERAL
PAINLEVEL_OUTOF10: 4
PAINLEVEL_OUTOF10: 1

## 2018-01-13 NOTE — PROGRESS NOTES
[facility] called and notified of patients home going and [facility] orders.  Electronically signed by Kevin Martínez on 1/13/2018 at 2:22 PM

## 2018-01-13 NOTE — PROGRESS NOTES
DAILY ORTHOPAEDIC POST-OP PROGRESS NOTE      Subjective: No acute events overnight. No chest pain/shortness of breath. Pain well-controlled. Swelling in the left leg is improving. Exam:   Vitals:    18 1155   BP: (!) 148/60   Pulse: 72   Resp: 16   Temp: 97.7 °F (36.5 °C)   SpO2: 96%      Temp (24hrs), Av.9 °F (36.6 °C), Min:97.7 °F (36.5 °C), Max:98.2 °F (36.8 °C)    NAD, breathing comfortably   Dressing clean, dry, and intact  Operative extremity: Motor and sensory intact. Warm and well-perfused. Calves soft and non-tender with mild edema. Assessment/Plan: 80 y.o. female s/p left TKA  1. Pain control  2. WBAT  3. PT  4. DVT Prophylaxis - Coumadin  5. Discharge planning - ok for discharge home    Labs reviewed:  CBC:   Recent Labs      18   0534  18   0538   WBC  7.0  6.5   HGB  11.5*  11.1*   PLT  232  223     BMP:    Recent Labs      18   0538   NA  144   K  4.0   CL  107   CO2  26   BUN  13   CREATININE  0.62   GLUCOSE  88       I&O  I/O last 3 completed shifts:   In: 2720 [P.O.:9680]  Out: -     Maribel Trevino MD  2018 3:01 PM

## 2018-01-15 NOTE — PROGRESS NOTES
Physical Therapy  Facility/Department: [facility] N229/N229-01  Physical Therapy Discharge      NAME: Laly Camp    : 1929 (80 y.o.)  MRN: [id number]    Account: [de-identified]  Gender: female      Patient has been discharged from acute care hospital. DC patient from current PT program.      Electronically signed by David Soriano PT on 1/15/18 at 4:50 PM

## 2018-02-28 ENCOUNTER — HOSPITAL ENCOUNTER (OUTPATIENT)
Dept: PHYSICAL THERAPY | Age: 83
Setting detail: THERAPIES SERIES
Discharge: HOME | End: 2018-02-28
Payer: MEDICARE

## 2018-02-28 PROCEDURE — G8979 MOBILITY GOAL STATUS: HCPCS

## 2018-02-28 PROCEDURE — 97016 VASOPNEUMATIC DEVICE THERAPY: CPT

## 2018-02-28 PROCEDURE — 97162 PT EVAL MOD COMPLEX 30 MIN: CPT

## 2018-02-28 PROCEDURE — G8978 MOBILITY CURRENT STATUS: HCPCS

## 2018-02-28 ASSESSMENT — PAIN SCALES - GENERAL: PAINLEVEL_OUTOF10: 0

## 2018-02-28 NOTE — PROGRESS NOTES
2000 UNITS CAPS Take 1 capsule by mouth 2 times daily       losartan (COZAAR) 50 MG tablet Take 50 mg by mouth      mirtazapine (REMERON) 30 MG tablet Take 30 mg by mouth       potassium chloride (KLOR-CON M) 10 MEQ extended release tablet Take 10 mEq by mouth 2 times daily       warfarin (COUMADIN) 2.5 MG tablet 2.5mg daily except 3.75mg Tuesdays       No current facility-administered medications on file prior to encounter. Subjective  Subjective: Pt reports onset of knee pain around 2001. Had Rt TKR 8 yrs ago. Pt with increased worsening of Lt knee pain and difficulty with mobility. Had Lt TKR 1/9/18. Hospital stay x4 days and received HHC.     Additional Pertinent Hx: OA, HTN, DVT, PE  Pain Screening  Patient Currently in Pain: Yes  Pain Assessment  Pain Assessment: 0-10  Pain Level: 0 (Denies pain over the past week.  )    Social/Functional History  Lives With: Spouse  Type of Home: House  Home Layout: One level, Laundry in basement (with 1 HR )  Home Access: Stairs to enter with rails  Entrance Stairs - Number of Steps: 3  Entrance Stairs - Rails: Both  Home Equipment: Quad cane, Cane (used cane prior to surgery )  ADL Assistance: Independent  Homemaking Assistance: Independent  Homemaking Responsibilities: No (dtr assists with housekeeping, family assisting with meals )  Ambulation Assistance: Independent  Transfer Assistance: Independent  Active : Yes  Mode of Transportation: Car  Occupation: Retired  Leisure & Hobbies: watches TV, reads paper and magazines, likes to davis          Objective  Vision  Vision: Within Functional Limits (glasses)  Hearing  Hearing: Within functional limits  Observation/Palpation  Posture: Fair (kyphosis with scoliotic curvature, Lt scapular winging )  Observation: Lt foot with toe out, pronation and eversion     Strength RLE  Strength RLE: Exception  R Hip Flexion: 4+/5  R Hip Extension: 4/5  R Hip ABduction: 4/5  R Knee Flexion: 4+/5  R Knee Extension:

## 2018-03-07 ENCOUNTER — HOSPITAL ENCOUNTER (OUTPATIENT)
Dept: PHYSICAL THERAPY | Age: 83
Setting detail: THERAPIES SERIES
Discharge: HOME | End: 2018-03-07
Payer: MEDICARE

## 2018-03-09 ENCOUNTER — HOSPITAL ENCOUNTER (OUTPATIENT)
Dept: PHYSICAL THERAPY | Age: 83
Setting detail: THERAPIES SERIES
Discharge: HOME | End: 2018-03-09
Payer: MEDICARE

## 2018-03-09 PROCEDURE — 97110 THERAPEUTIC EXERCISES: CPT

## 2018-03-09 PROCEDURE — 97016 VASOPNEUMATIC DEVICE THERAPY: CPT

## 2018-03-09 PROCEDURE — 97140 MANUAL THERAPY 1/> REGIONS: CPT

## 2018-03-13 ENCOUNTER — HOSPITAL ENCOUNTER (OUTPATIENT)
Dept: PHYSICAL THERAPY | Age: 83
Setting detail: THERAPIES SERIES
Discharge: HOME | End: 2018-03-13
Payer: MEDICARE

## 2018-03-13 PROCEDURE — 97110 THERAPEUTIC EXERCISES: CPT

## 2018-03-13 PROCEDURE — 97016 VASOPNEUMATIC DEVICE THERAPY: CPT

## 2018-03-13 PROCEDURE — 97140 MANUAL THERAPY 1/> REGIONS: CPT

## 2018-03-13 NOTE — PROGRESS NOTES
[id number] [address]  Outpatient Physical Therapy    Treatment Note        Date: 3/13/2018  Patient: Elenita Cummings  : 1929  ACCT #: [de-identified]  Referring Practitioner: Dr. Geovany Green   Diagnosis: DJD knee    Visit Information:  PT Visit Information  Onset Date: 18  PT Insurance Information: AET[employer] Medicare   Total # of Visits Approved:  ($20 copay)  Total # of Visits to Date: 3  Plan of Care/Certification Expiration Date: 18  No Show: 0  Canceled Appointment: 0  Progress Note Counter: 3/10    Subjective: Pt reports compliance with HEP Amb to clinic with Quad cane. HEP Compliance:  [x] Good [] Fair [] Poor [] Reports not doing due to:    Vital Signs  Patient Currently in Pain: Denies   Pain Screening  Patient Currently in Pain: Denies    OBJECTIVE:   Exercises  Exercise 2: Scifit L2 x 5'  Exercise 3: 2 way SLR 2 x10 L  Exercise 4: gait drills at II F/L/R x3 laps each with 1-2 UE support  Exercise 6: step ups 4\" F/L x15 L  Exercise 7: Bridge 5\"x10 with towel d/t cramping       Strength: [x] NT  [] MMT completed:     ROM: [] NT  [x] ROM measurements:   AROM LLE (degrees)  L Knee Flexion 0-145: 98 deg seated flexion. Manual:   Manual therapy  PROM: Lt knee flexion and extension; total manual 8 min    Modalities:  Modalities  Other: Game ready x10 minutes: pre measurement 47.3, post 44.1     *Indicates exercise, modality, or manual techniques to be initiated when appropriate    Assessment: Body structures, Functions, Activity limitations: Decreased functional mobility , Decreased ROM, Decreased strength, Decreased endurance  Assessment: Good tolerance to activity this date without report of inc pain. Cramps with bridges even with towel. Pt able to negotiate gait drill with only occasional UE assist, 1 LOB with lateral stepping Left, recovered on own. Good tolerance to progressions and treatment.   Treatment Diagnosis: Lt LE edema, decreased Lt LE ROM, Lt LE weakness,

## 2018-03-16 ENCOUNTER — HOSPITAL ENCOUNTER (OUTPATIENT)
Dept: PHYSICAL THERAPY | Age: 83
Setting detail: THERAPIES SERIES
Discharge: HOME | End: 2018-03-16
Payer: MEDICARE

## 2018-03-16 PROCEDURE — 97110 THERAPEUTIC EXERCISES: CPT

## 2018-03-16 PROCEDURE — 97140 MANUAL THERAPY 1/> REGIONS: CPT

## 2018-03-16 PROCEDURE — 97016 VASOPNEUMATIC DEVICE THERAPY: CPT

## 2018-03-16 NOTE — PROGRESS NOTES
[address] [address]  Outpatient Physical Therapy    Treatment Note        Date: 3/16/2018  Patient: Viridiana Code  : 1929  ACCT #: [de-identified]  Referring Practitioner: Dr. Jadiel Pickett   Diagnosis: DJD knee    Visit Information:  PT Visit Information  Onset Date: 18  PT Insurance Information: AETNA Medicare   Total # of Visits Approved:  ($20 copay)  Total # of Visits to Date: 4  Plan of Care/Certification Expiration Date: 18  No Show: 0  Canceled Appointment: 0  Progress Note Counter: 4/10    Subjective: Performing HEP 1x/day. No new complaints this date. Pt reports swelling going down and increase wt bearing tolerance. Comments: Ices knee every morning for swelling  HEP Compliance:  [x] Good [] Fair [] Poor [] Reports not doing due to:    Vital Signs  Patient Currently in Pain: No   Pain Screening  Patient Currently in Pain: No    OBJECTIVE:   Exercises  Exercise 2: Scifit L2 x 5'  Exercise 3: 2 way SLR 2 x10 L  Exercise 4: gait drills at II F/L/R x3 laps each with 2 UE support (HHA from therapist walking)  Exercise 6: step ups 4\" F/L x15 L  Exercise 7: Bridge 5\"x10 with towel d/t cramping  Exercise 8: sit to stand (table 22\" from floor) x 10 and x10 with 4\"step under r foot. Exercise 20: HEP: 2 way SLR, QS, sink ex's       Strength: [x] NT  [] MMT completed:   ROM: [] NT  [x] ROM measurements:      PROM LLE (degrees)  L Knee Flexion 0-145: 102 AAROM  AROM LLE (degrees)  L Knee Flexion 0-145: 99 deg seated flexion. Manual:   Manual therapy  PROM: Lt knee flexion ; total manual 8 min  Soft Tissue Mobalization: mm around the knee, above and below to release tightness    Modalities:  Modalities  Other: Game ready x10 minutes: pre measurement 47.7, post 47.1     *Indicates exercise, modality, or manual techniques to be initiated when appropriate    Assessment:       Body structures, Functions, Activity limitations: Decreased functional mobility , Decreased ROM, Decreased In: 1300  Time Out: Jenifer 19  Minutes: 59  Timed Code Treatment Minutes: 42 Minutes  Procedure Minutes:10    Signature:  Electronically signed by Mike Rene PT on 3/16/18 at 2:24 PM

## 2018-03-20 ENCOUNTER — HOSPITAL ENCOUNTER (OUTPATIENT)
Dept: PHYSICAL THERAPY | Age: 83
Setting detail: THERAPIES SERIES
Discharge: HOME | End: 2018-03-20
Payer: MEDICARE

## 2018-03-20 PROCEDURE — 97140 MANUAL THERAPY 1/> REGIONS: CPT

## 2018-03-20 PROCEDURE — 97110 THERAPEUTIC EXERCISES: CPT

## 2018-03-20 PROCEDURE — 97016 VASOPNEUMATIC DEVICE THERAPY: CPT

## 2018-03-20 NOTE — PROGRESS NOTES
[address] [address]  Outpatient Physical Therapy    Treatment Note        Date: 3/22/2018  Patient: Cleo Sharif  : 1929  ACCT #: [de-identified]  Referring Practitioner: Dr. No Vega   Diagnosis: DJD knee    Visit Information:  PT Visit Information  Onset Date: 18  PT Insurance Information: AETNA Medicare   Total # of Visits Approved:  ($20 copay)  Total # of Visits to Date: 5  Plan of Care/Certification Expiration Date: 18  No Show: 0  Canceled Appointment: 0  Progress Note Counter: 5/10    Subjective: Pt reports feeling good today. Occasionally gets sharp pain ant. right knee. States swelling down when I tie my shoe I'm getting a bigger bow. Comments: Ices knee every morning for swelling  HEP Compliance:  [x] Good [] Fair [] Poor [] Reports not doing due to:    Vital Signs  Patient Currently in Pain: No   Pain Screening  Patient Currently in Pain: No    OBJECTIVE:   Exercises  Exercise 2: Scifit L2.2 x 5'  Exercise 3: 2 way SLR 2 x12 L  Exercise 4: gait drills at II F/L/R x3 laps each with 2 UE support   Exercise 5: hip add with ball 5s x 15  Exercise 6: step ups 4\" F/L x15 L  Exercise 7: Bridge 5\"x15 with towel d/t cramping  Exercise 9: hip abd seated with RTB x 15  Exercise 10: LAQ 1# x 15    Strength: [x] NT  [] MMT completed:      ROM: [] NT  [x] ROM measurements:   AROM LLE (degrees)  L Knee Flexion 0-145: 102 deg seated flexion. Manual:   Manual therapy  PROM: Lt knee flexion ; total manual 8 min    Modalities:  Modalities  Other: Game ready x10 minutes: pre measurement 50.0 CM, post 48.1     *Indicates exercise, modality, or manual techniques to be initiated when appropriate    Assessment: Body structures, Functions, Activity limitations: Decreased functional mobility , Decreased ROM, Decreased strength, Decreased endurance  Assessment: Better gait in II bars decreased trendelenburg occasional touch of bar for balance. Imporved ROM nioted today.  Good

## 2018-03-23 ENCOUNTER — HOSPITAL ENCOUNTER (OUTPATIENT)
Dept: PHYSICAL THERAPY | Age: 83
Setting detail: THERAPIES SERIES
Discharge: HOME | End: 2018-03-23
Payer: MEDICARE

## 2018-03-23 PROCEDURE — 97110 THERAPEUTIC EXERCISES: CPT

## 2018-03-23 PROCEDURE — 97016 VASOPNEUMATIC DEVICE THERAPY: CPT

## 2018-03-23 PROCEDURE — 97140 MANUAL THERAPY 1/> REGIONS: CPT

## 2018-03-23 NOTE — PROGRESS NOTES
[address] [address]  Outpatient Physical Therapy    Treatment Note        Date: 3/23/2018  Patient: Laly Camp  : 1929  ACCT #: [de-identified]  Referring Practitioner: Dr. Barbra Luong   Diagnosis: DJD knee    Visit Information:  PT Visit Information  Onset Date: 18  PT Insurance Information: AETNA Medicare   Total # of Visits Approved:  ($20 copay)  Total # of Visits to Date: 6  Plan of Care/Certification Expiration Date: 18  No Show: 0  Canceled Appointment: 0  Progress Note Counter: 6/10    Subjective: Pt reports was a little sore after last session. States 75% better since starting therapy. Comments: Ices knee every morning for swelling  HEP Compliance:  [x] Good [] Fair [] Poor [] Reports not doing due to:    Vital Signs  Patient Currently in Pain: No   Pain Screening  Patient Currently in Pain: No    OBJECTIVE:   Exercises  Exercise 2: Scifit L2.4 x 5'  Exercise 3: 2 way SLR 2 x15 L  Exercise 4: gait drills at II F/L/R x3 laps each without UE support   Exercise 5: hip add with ball 5s x 20  Exercise 6: step ups 4\" F/L x15 L  Exercise 7: Bridge 5\"x15 with belt d/t cramping  Exercise 8: sit to stand (table 22\" from floor) x 10 and x10 with 4\"step under r foot. Exercise 9: hip abd seated with RTB x 20  Exercise 10: LAQ 1# x 20       Strength: [x] NT  [] MMT completed:     ROM: [x] NT  [] ROM measurements:   AROM LLE (degrees)  L Knee Flexion 0-145: 103 deg seated flexion. Manual:   Manual therapy  PROM: Lt knee flexion ; total manual 8 min  Soft Tissue Mobalization: mm around the knee, above and below to release tightness    Modalities:  Modalities  Other: Game ready x10 minutes: pre measurement 48.2 CM, post 47.8 CM     *Indicates exercise, modality, or manual techniques to be initiated when appropriate    Assessment:         Body structures, Functions, Activity limitations: Decreased functional mobility , Decreased ROM, Decreased strength, Decreased endurance  Assessment: [address]: 56  Timed Code Treatment Minutes: 40 Minutes  Procedure Minutes: 10    Signature:  Electronically signed by Mini Aguila PTA on 3/23/18 at 3:53 PM

## 2018-03-27 ENCOUNTER — HOSPITAL ENCOUNTER (OUTPATIENT)
Dept: PHYSICAL THERAPY | Age: 83
Setting detail: THERAPIES SERIES
Discharge: HOME | End: 2018-03-27
Payer: MEDICARE

## 2018-03-27 PROCEDURE — 97140 MANUAL THERAPY 1/> REGIONS: CPT

## 2018-03-27 PROCEDURE — 97110 THERAPEUTIC EXERCISES: CPT

## 2018-03-27 PROCEDURE — 97016 VASOPNEUMATIC DEVICE THERAPY: CPT

## 2018-03-27 NOTE — PROGRESS NOTES
Good tolerance to progressions and treatment. Treatment Diagnosis: Lt LE edema, decreased Lt LE ROM, Lt LE weakness, abnormal gait   Prognosis: Good  Patient Education: Continue with home exercises. Goals:  Short term goals  Time Frame for Short term goals: 2 weeks   Short term goal 1: Independent with HEP   Short term goal 2: Decrease LE edema to improve LE  knee flexion for increased ease with stairs     Long term goals  Time Frame for Long term goals : 5 weeks   Long term goal 1: Improve Lt LE strength 4+/5 to allow increased ease on stairs   Long term goal 2: Amb[place]te with ' independently with improved Lt stance time and foot clearance   Long term goal 3: Up/ down 4-6\" steps independently with 1 HR   Long term goal 4: Improve Lt LE  knee flexion to allow stairs with reciprocal pattern   Progress toward goals: On going. POST-PAIN       Pain Rating (0-10 pain scale):  0 /10   Location and pain description same as pre-treatment unless indicated. Action: [] NA   [x] Perform HEP  [] Meds as prescribed  [] Modalities as prescribed   [] Call Physician     Frequency/Duration:  Plan  Times per week: 2  Plan weeks: 5  Current Treatment Recommendations: Strengthening, ROM, Balance Training, Gait Training, Stair training, Home Exercise Program, Manual Therapy - Soft Tissue Mobilization, Manual Therapy - Joint Manipulation, Safety Education & Training, Patient/Caregiver Education & Training, Equipment Evaluation, Education, & procurement, Modalities, Integrated Dry Needling  Plan Comment: Transfer care of pt to [facility], PT      Pt to continue current HEP. See objective section for any therapeutic exercise changes, additions or modifications this date.          PT Individual Minutes  Time In: 3955  Time Out: 8946  Minutes: 55  Timed Code Treatment Minutes: 42 Minutes  Procedure Minutes: 10    Signature:  Electronically signed by Kimberly Anthony PTA on 3/27/18 at 4:46 PM

## 2018-03-30 ENCOUNTER — HOSPITAL ENCOUNTER (OUTPATIENT)
Dept: PHYSICAL THERAPY | Age: 83
Setting detail: THERAPIES SERIES
Discharge: HOME | End: 2018-03-30
Payer: MEDICARE

## 2018-03-30 PROCEDURE — 97110 THERAPEUTIC EXERCISES: CPT

## 2018-03-30 PROCEDURE — 97140 MANUAL THERAPY 1/> REGIONS: CPT

## 2018-03-30 PROCEDURE — 97016 VASOPNEUMATIC DEVICE THERAPY: CPT

## 2018-03-30 PROCEDURE — 97112 NEUROMUSCULAR REEDUCATION: CPT

## 2018-03-30 ASSESSMENT — PAIN SCALES - GENERAL: PAINLEVEL_OUTOF10: 0

## 2018-04-03 ENCOUNTER — HOSPITAL ENCOUNTER (OUTPATIENT)
Dept: PHYSICAL THERAPY | Age: 83
Setting detail: THERAPIES SERIES
Discharge: HOME | End: 2018-04-03
Payer: MEDICARE

## 2018-04-03 PROCEDURE — 97116 GAIT TRAINING THERAPY: CPT

## 2018-04-03 PROCEDURE — 97016 VASOPNEUMATIC DEVICE THERAPY: CPT

## 2018-04-03 PROCEDURE — 97140 MANUAL THERAPY 1/> REGIONS: CPT

## 2018-04-03 PROCEDURE — 97110 THERAPEUTIC EXERCISES: CPT

## 2018-04-06 ENCOUNTER — HOSPITAL ENCOUNTER (OUTPATIENT)
Dept: PHYSICAL THERAPY | Age: 83
Setting detail: THERAPIES SERIES
Discharge: HOME | End: 2018-04-06
Payer: MEDICARE

## 2018-04-06 PROCEDURE — 97140 MANUAL THERAPY 1/> REGIONS: CPT

## 2018-04-06 PROCEDURE — 97016 VASOPNEUMATIC DEVICE THERAPY: CPT

## 2018-04-06 PROCEDURE — G8979 MOBILITY GOAL STATUS: HCPCS

## 2018-04-06 PROCEDURE — G8980 MOBILITY D/C STATUS: HCPCS

## 2018-04-06 PROCEDURE — G8978 MOBILITY CURRENT STATUS: HCPCS

## 2018-04-06 PROCEDURE — 97110 THERAPEUTIC EXERCISES: CPT

## 2019-10-24 ENCOUNTER — APPOINTMENT (OUTPATIENT)
Dept: CT IMAGING | Age: 84
End: 2019-10-24
Payer: MEDICARE

## 2019-10-24 ENCOUNTER — HOSPITAL ENCOUNTER (OUTPATIENT)
Age: 84
Setting detail: OBSERVATION
Discharge: HOME | End: 2019-10-25
Attending: STUDENT IN AN ORGANIZED HEALTH CARE EDUCATION/TRAINING PROGRAM | Admitting: INTERNAL MEDICINE
Payer: MEDICARE

## 2019-10-24 ENCOUNTER — APPOINTMENT (OUTPATIENT)
Dept: GENERAL RADIOLOGY | Age: 84
End: 2019-10-24
Payer: MEDICARE

## 2019-10-24 DIAGNOSIS — Z86.79 HISTORY OF ATRIAL FIBRILLATION: ICD-10-CM

## 2019-10-24 DIAGNOSIS — I10 CHRONIC HYPERTENSION: ICD-10-CM

## 2019-10-24 DIAGNOSIS — R55 NEAR SYNCOPE: Primary | ICD-10-CM

## 2019-10-24 DIAGNOSIS — Z79.01 CHRONIC ANTICOAGULATION: ICD-10-CM

## 2019-10-24 LAB
ABO/RH: NORMAL
ALBUMIN SERPL-MCNC: 3.7 G/DL (ref 3.5–4.6)
ALP BLD-CCNC: 96 U/L (ref 40–130)
ALT SERPL-CCNC: 10 U/L (ref 0–33)
ANION GAP SERPL CALCULATED.3IONS-SCNC: 10 MEQ/L (ref 9–15)
ANTIBODY SCREEN: NORMAL
APTT: 38.3 SEC (ref 24.4–36.8)
AST SERPL-CCNC: 28 U/L (ref 0–35)
BACTERIA: NEGATIVE /HPF
BASOPHILS ABSOLUTE: 0 K/UL (ref 0–0.2)
BASOPHILS RELATIVE PERCENT: 1 %
BILIRUB SERPL-MCNC: 0.8 MG/DL (ref 0.2–0.7)
BILIRUBIN URINE: NEGATIVE
BLOOD, URINE: NEGATIVE
BUN BLDV-MCNC: 9 MG/DL (ref 8–23)
C-REACTIVE PROTEIN, HIGH SENSITIVITY: 1.1 MG/L (ref 0–5)
CALCIUM SERPL-MCNC: 9.1 MG/DL (ref 8.5–9.9)
CHLORIDE BLD-SCNC: 102 MEQ/L (ref 95–107)
CHOLESTEROL, TOTAL: 178 MG/DL (ref 0–199)
CHP ED QC CHECK: NORMAL
CLARITY: CLEAR
CO2: 26 MEQ/L (ref 20–31)
COLOR: YELLOW
CREAT SERPL-MCNC: 0.66 MG/DL (ref 0.5–0.9)
EKG ATRIAL RATE: 77 BPM
EKG P AXIS: 30 DEGREES
EKG P-R INTERVAL: 166 MS
EKG Q-T INTERVAL: 374 MS
EKG QRS DURATION: 78 MS
EKG QTC CALCULATION (BAZETT): 423 MS
EKG R AXIS: -41 DEGREES
EKG T AXIS: 27 DEGREES
EKG VENTRICULAR RATE: 77 BPM
EOSINOPHILS ABSOLUTE: 0.2 K/UL (ref 0–0.7)
EOSINOPHILS RELATIVE PERCENT: 3.1 %
EPITHELIAL CELLS, UA: NORMAL /HPF (ref 0–5)
GFR AFRICAN AMERICAN: >60
GFR NON-AFRICAN AMERICAN: >60
GLOBULIN: 2.9 G/DL (ref 2.3–3.5)
GLUCOSE BLD-MCNC: 101 MG/DL
GLUCOSE BLD-MCNC: 101 MG/DL (ref 60–115)
GLUCOSE BLD-MCNC: 98 MG/DL (ref 70–99)
GLUCOSE URINE: NEGATIVE MG/DL
HCT VFR BLD CALC: 40.3 % (ref 37–47)
HDLC SERPL-MCNC: 72 MG/DL (ref 40–59)
HEMOGLOBIN: 13.3 G/DL (ref 12–16)
HYALINE CASTS: NORMAL /HPF (ref 0–5)
INR BLD: 2.3
KETONES, URINE: NEGATIVE MG/DL
LDL CHOLESTEROL CALCULATED: 88 MG/DL (ref 0–129)
LEUKOCYTE ESTERASE, URINE: ABNORMAL
LYMPHOCYTES ABSOLUTE: 1.3 K/UL (ref 1–4.8)
LYMPHOCYTES RELATIVE PERCENT: 25.7 %
MAGNESIUM: 2 MG/DL (ref 1.7–2.4)
MCH RBC QN AUTO: 29.9 PG (ref 27–31.3)
MCHC RBC AUTO-ENTMCNC: 33.1 % (ref 33–37)
MCV RBC AUTO: 90.3 FL (ref 82–100)
MONOCYTES ABSOLUTE: 0.6 K/UL (ref 0.2–0.8)
MONOCYTES RELATIVE PERCENT: 11.1 %
NEUTROPHILS ABSOLUTE: 2.9 K/UL (ref 1.4–6.5)
NEUTROPHILS RELATIVE PERCENT: 59.1 %
NITRITE, URINE: NEGATIVE
PDW BLD-RTO: 14.6 % (ref 11.5–14.5)
PERFORMED ON: NORMAL
PH UA: 6.5 (ref 5–9)
PLATELET # BLD: 227 K/UL (ref 130–400)
POTASSIUM SERPL-SCNC: 4.5 MEQ/L (ref 3.4–4.9)
PRO-BNP: 411 PG/ML
PROTEIN UA: NEGATIVE MG/DL
PROTHROMBIN TIME: 26.3 SEC (ref 12.3–14.9)
RBC # BLD: 4.46 M/UL (ref 4.2–5.4)
RBC UA: NORMAL /HPF (ref 0–5)
SODIUM BLD-SCNC: 138 MEQ/L (ref 135–144)
SPECIFIC GRAVITY UA: 1 (ref 1–1.03)
TOTAL CK: 65 U/L (ref 0–170)
TOTAL PROTEIN: 6.6 G/DL (ref 6.3–8)
TRIGL SERPL-MCNC: 90 MG/DL (ref 0–150)
TROPONIN: <0.01 NG/ML (ref 0–0.01)
URINE REFLEX TO CULTURE: YES
UROBILINOGEN, URINE: 0.2 E.U./DL
WBC # BLD: 5 K/UL (ref 4.8–10.8)
WBC UA: NORMAL /HPF (ref 0–5)

## 2019-10-24 PROCEDURE — 83880 ASSAY OF NATRIURETIC PEPTIDE: CPT

## 2019-10-24 PROCEDURE — 71045 X-RAY EXAM CHEST 1 VIEW: CPT

## 2019-10-24 PROCEDURE — 86901 BLOOD TYPING SEROLOGIC RH(D): CPT

## 2019-10-24 PROCEDURE — 71275 CT ANGIOGRAPHY CHEST: CPT

## 2019-10-24 PROCEDURE — 85730 THROMBOPLASTIN TIME PARTIAL: CPT

## 2019-10-24 PROCEDURE — 6370000000 HC RX 637 (ALT 250 FOR IP): Performed by: INTERNAL MEDICINE

## 2019-10-24 PROCEDURE — 80061 LIPID PANEL: CPT

## 2019-10-24 PROCEDURE — 99285 EMERGENCY DEPT VISIT HI MDM: CPT

## 2019-10-24 PROCEDURE — 86850 RBC ANTIBODY SCREEN: CPT

## 2019-10-24 PROCEDURE — G0378 HOSPITAL OBSERVATION PER HR: HCPCS

## 2019-10-24 PROCEDURE — 81001 URINALYSIS AUTO W/SCOPE: CPT

## 2019-10-24 PROCEDURE — 86141 C-REACTIVE PROTEIN HS: CPT

## 2019-10-24 PROCEDURE — 80053 COMPREHEN METABOLIC PANEL: CPT

## 2019-10-24 PROCEDURE — 85610 PROTHROMBIN TIME: CPT

## 2019-10-24 PROCEDURE — 86900 BLOOD TYPING SEROLOGIC ABO: CPT

## 2019-10-24 PROCEDURE — 36415 COLL VENOUS BLD VENIPUNCTURE: CPT

## 2019-10-24 PROCEDURE — 6360000004 HC RX CONTRAST MEDICATION: Performed by: INTERNAL MEDICINE

## 2019-10-24 PROCEDURE — 83735 ASSAY OF MAGNESIUM: CPT

## 2019-10-24 PROCEDURE — 2580000003 HC RX 258: Performed by: INTERNAL MEDICINE

## 2019-10-24 PROCEDURE — 87086 URINE CULTURE/COLONY COUNT: CPT

## 2019-10-24 PROCEDURE — 85025 COMPLETE CBC W/AUTO DIFF WBC: CPT

## 2019-10-24 PROCEDURE — 84484 ASSAY OF TROPONIN QUANT: CPT

## 2019-10-24 PROCEDURE — 70450 CT HEAD/BRAIN W/O DYE: CPT

## 2019-10-24 PROCEDURE — 93005 ELECTROCARDIOGRAM TRACING: CPT | Performed by: STUDENT IN AN ORGANIZED HEALTH CARE EDUCATION/TRAINING PROGRAM

## 2019-10-24 PROCEDURE — 82550 ASSAY OF CK (CPK): CPT

## 2019-10-24 RX ORDER — CLONAZEPAM 0.5 MG/1
0.5 TABLET ORAL 2 TIMES DAILY PRN
Status: DISCONTINUED | OUTPATIENT
Start: 2019-10-24 | End: 2019-10-25 | Stop reason: HOSPADM

## 2019-10-24 RX ORDER — SODIUM CHLORIDE 0.9 % (FLUSH) 0.9 %
10 SYRINGE (ML) INJECTION PRN
Status: DISCONTINUED | OUTPATIENT
Start: 2019-10-24 | End: 2019-10-25 | Stop reason: HOSPADM

## 2019-10-24 RX ORDER — LOSARTAN POTASSIUM 50 MG/1
50 TABLET ORAL DAILY
Status: DISCONTINUED | OUTPATIENT
Start: 2019-10-24 | End: 2019-10-25 | Stop reason: HOSPADM

## 2019-10-24 RX ORDER — ONDANSETRON 2 MG/ML
4 INJECTION INTRAMUSCULAR; INTRAVENOUS EVERY 6 HOURS PRN
Status: DISCONTINUED | OUTPATIENT
Start: 2019-10-24 | End: 2019-10-25 | Stop reason: HOSPADM

## 2019-10-24 RX ORDER — CARVEDILOL 25 MG/1
25 TABLET ORAL 2 TIMES DAILY
Status: DISCONTINUED | OUTPATIENT
Start: 2019-10-24 | End: 2019-10-25 | Stop reason: HOSPADM

## 2019-10-24 RX ORDER — ASPIRIN 81 MG/1
81 TABLET ORAL 2 TIMES DAILY
Status: DISCONTINUED | OUTPATIENT
Start: 2019-10-24 | End: 2019-10-25 | Stop reason: HOSPADM

## 2019-10-24 RX ORDER — SODIUM CHLORIDE 0.9 % (FLUSH) 0.9 %
10 SYRINGE (ML) INJECTION EVERY 12 HOURS SCHEDULED
Status: DISCONTINUED | OUTPATIENT
Start: 2019-10-24 | End: 2019-10-25 | Stop reason: HOSPADM

## 2019-10-24 RX ORDER — SODIUM CHLORIDE 9 MG/ML
INJECTION, SOLUTION INTRAVENOUS CONTINUOUS
Status: DISCONTINUED | OUTPATIENT
Start: 2019-10-24 | End: 2019-10-25

## 2019-10-24 RX ORDER — ACETAMINOPHEN 500 MG
500 TABLET ORAL EVERY 8 HOURS SCHEDULED
Status: DISCONTINUED | OUTPATIENT
Start: 2019-10-24 | End: 2019-10-25 | Stop reason: HOSPADM

## 2019-10-24 RX ORDER — MIRTAZAPINE 30 MG/1
30 TABLET, FILM COATED ORAL NIGHTLY
Status: DISCONTINUED | OUTPATIENT
Start: 2019-10-24 | End: 2019-10-25 | Stop reason: HOSPADM

## 2019-10-24 RX ORDER — WARFARIN SODIUM 2.5 MG/1
2.5 TABLET ORAL
Status: COMPLETED | OUTPATIENT
Start: 2019-10-24 | End: 2019-10-24

## 2019-10-24 RX ADMIN — Medication 10 ML: at 22:59

## 2019-10-24 RX ADMIN — WARFARIN SODIUM 2.5 MG: 2.5 TABLET ORAL at 18:41

## 2019-10-24 RX ADMIN — SODIUM CHLORIDE: 9 INJECTION, SOLUTION INTRAVENOUS at 23:03

## 2019-10-24 RX ADMIN — IOPAMIDOL 100 ML: 612 INJECTION, SOLUTION INTRAVENOUS at 18:12

## 2019-10-24 ASSESSMENT — ENCOUNTER SYMPTOMS
SINUS PAIN: 0
SINUS PRESSURE: 0
COUGH: 0
NAUSEA: 0
CONSTIPATION: 0
APNEA: 0
FACIAL SWELLING: 0
VOMITING: 0
TROUBLE SWALLOWING: 0
ABDOMINAL PAIN: 0
DIARRHEA: 0
BACK PAIN: 0
ALLERGIC/IMMUNOLOGIC NEGATIVE: 1
EYE REDNESS: 0
RHINORRHEA: 0
EYE DISCHARGE: 0
SHORTNESS OF BREATH: 0
WHEEZING: 0
BLOOD IN STOOL: 0
CHEST TIGHTNESS: 0

## 2019-10-25 VITALS
DIASTOLIC BLOOD PRESSURE: 73 MMHG | BODY MASS INDEX: 35.26 KG/M2 | OXYGEN SATURATION: 95 % | WEIGHT: 199 LBS | SYSTOLIC BLOOD PRESSURE: 132 MMHG | HEIGHT: 63 IN | HEART RATE: 76 BPM | TEMPERATURE: 97.9 F | RESPIRATION RATE: 18 BRPM

## 2019-10-25 LAB
ANION GAP SERPL CALCULATED.3IONS-SCNC: 9 MEQ/L (ref 9–15)
BASOPHILS ABSOLUTE: 0 K/UL (ref 0–0.2)
BASOPHILS RELATIVE PERCENT: 0.6 %
BUN BLDV-MCNC: 7 MG/DL (ref 8–23)
CALCIUM SERPL-MCNC: 8.1 MG/DL (ref 8.5–9.9)
CHLORIDE BLD-SCNC: 105 MEQ/L (ref 95–107)
CO2: 25 MEQ/L (ref 20–31)
CREAT SERPL-MCNC: 0.57 MG/DL (ref 0.5–0.9)
EOSINOPHILS ABSOLUTE: 0.2 K/UL (ref 0–0.7)
EOSINOPHILS RELATIVE PERCENT: 3.7 %
GFR AFRICAN AMERICAN: >60
GFR NON-AFRICAN AMERICAN: >60
GLUCOSE BLD-MCNC: 91 MG/DL (ref 70–99)
HCT VFR BLD CALC: 36.6 % (ref 37–47)
HEMOGLOBIN: 12 G/DL (ref 12–16)
INR BLD: 2.4
LV EF: 65 %
LVEF MODALITY: NORMAL
LYMPHOCYTES ABSOLUTE: 1 K/UL (ref 1–4.8)
LYMPHOCYTES RELATIVE PERCENT: 24 %
MAGNESIUM: 1.9 MG/DL (ref 1.7–2.4)
MCH RBC QN AUTO: 29.8 PG (ref 27–31.3)
MCHC RBC AUTO-ENTMCNC: 32.9 % (ref 33–37)
MCV RBC AUTO: 90.6 FL (ref 82–100)
MONOCYTES ABSOLUTE: 0.5 K/UL (ref 0.2–0.8)
MONOCYTES RELATIVE PERCENT: 10.6 %
NEUTROPHILS ABSOLUTE: 2.6 K/UL (ref 1.4–6.5)
NEUTROPHILS RELATIVE PERCENT: 61.1 %
PDW BLD-RTO: 14.6 % (ref 11.5–14.5)
PLATELET # BLD: 215 K/UL (ref 130–400)
POTASSIUM REFLEX MAGNESIUM: 3.9 MEQ/L (ref 3.4–4.9)
PROTHROMBIN TIME: 27 SEC (ref 12.3–14.9)
RBC # BLD: 4.04 M/UL (ref 4.2–5.4)
SODIUM BLD-SCNC: 139 MEQ/L (ref 135–144)
URINE CULTURE, ROUTINE: NORMAL
WBC # BLD: 4.3 K/UL (ref 4.8–10.8)

## 2019-10-25 PROCEDURE — 2580000003 HC RX 258: Performed by: INTERNAL MEDICINE

## 2019-10-25 PROCEDURE — 93306 TTE W/DOPPLER COMPLETE: CPT

## 2019-10-25 PROCEDURE — 85025 COMPLETE CBC W/AUTO DIFF WBC: CPT

## 2019-10-25 PROCEDURE — 97162 PT EVAL MOD COMPLEX 30 MIN: CPT

## 2019-10-25 PROCEDURE — G0378 HOSPITAL OBSERVATION PER HR: HCPCS

## 2019-10-25 PROCEDURE — 6370000000 HC RX 637 (ALT 250 FOR IP): Performed by: INTERNAL MEDICINE

## 2019-10-25 PROCEDURE — 93010 ELECTROCARDIOGRAM REPORT: CPT | Performed by: INTERNAL MEDICINE

## 2019-10-25 PROCEDURE — 80048 BASIC METABOLIC PNL TOTAL CA: CPT

## 2019-10-25 PROCEDURE — 36415 COLL VENOUS BLD VENIPUNCTURE: CPT

## 2019-10-25 PROCEDURE — 83735 ASSAY OF MAGNESIUM: CPT

## 2019-10-25 PROCEDURE — 85610 PROTHROMBIN TIME: CPT

## 2019-10-25 RX ORDER — WARFARIN SODIUM 2.5 MG/1
2.5 TABLET ORAL
Status: COMPLETED | OUTPATIENT
Start: 2019-10-25 | End: 2019-10-25

## 2019-10-25 RX ORDER — AMLODIPINE BESYLATE 2.5 MG/1
2.5 TABLET ORAL DAILY
Qty: 30 TABLET | Refills: 3 | Status: SHIPPED | OUTPATIENT
Start: 2019-10-25

## 2019-10-25 RX ADMIN — CLONAZEPAM 0.5 MG: 0.5 TABLET ORAL at 06:50

## 2019-10-25 RX ADMIN — LOSARTAN POTASSIUM 50 MG: 50 TABLET ORAL at 10:34

## 2019-10-25 RX ADMIN — SODIUM CHLORIDE: 9 INJECTION, SOLUTION INTRAVENOUS at 10:31

## 2019-10-25 RX ADMIN — CARVEDILOL 25 MG: 25 TABLET ORAL at 10:34

## 2019-10-25 RX ADMIN — WARFARIN SODIUM 2.5 MG: 2.5 TABLET ORAL at 18:48

## 2019-10-25 RX ADMIN — ASPIRIN 81 MG: 81 TABLET, COATED ORAL at 10:41

## 2019-10-25 ASSESSMENT — PAIN SCALES - GENERAL: PAINLEVEL_OUTOF10: 0

## 2020-06-08 ENCOUNTER — APPOINTMENT (OUTPATIENT)
Dept: CT IMAGING | Age: 85
DRG: 390 | End: 2020-06-08
Payer: MEDICARE

## 2020-06-08 ENCOUNTER — HOSPITAL ENCOUNTER (INPATIENT)
Age: 85
LOS: 1 days | Discharge: HOME | DRG: 390 | End: 2020-06-10
Attending: EMERGENCY MEDICINE | Admitting: EMERGENCY MEDICINE
Payer: MEDICARE

## 2020-06-08 LAB
ALBUMIN SERPL-MCNC: 4 G/DL (ref 3.5–4.6)
ALP BLD-CCNC: 115 U/L (ref 40–130)
ALT SERPL-CCNC: 14 U/L (ref 0–33)
ANION GAP SERPL CALCULATED.3IONS-SCNC: 13 MEQ/L (ref 9–15)
APTT: 37.8 SEC (ref 24.4–36.8)
AST SERPL-CCNC: 27 U/L (ref 0–35)
BASOPHILS ABSOLUTE: 0 K/UL (ref 0–0.2)
BASOPHILS RELATIVE PERCENT: 0.4 %
BILIRUB SERPL-MCNC: 0.8 MG/DL (ref 0.2–0.7)
BUN BLDV-MCNC: 12 MG/DL (ref 8–23)
CALCIUM SERPL-MCNC: 10.1 MG/DL (ref 8.5–9.9)
CHLORIDE BLD-SCNC: 99 MEQ/L (ref 95–107)
CO2: 24 MEQ/L (ref 20–31)
CREAT SERPL-MCNC: 0.73 MG/DL (ref 0.5–0.9)
EKG ATRIAL RATE: 88 BPM
EKG P AXIS: 13 DEGREES
EKG P-R INTERVAL: 162 MS
EKG Q-T INTERVAL: 350 MS
EKG QRS DURATION: 78 MS
EKG QTC CALCULATION (BAZETT): 423 MS
EKG R AXIS: -51 DEGREES
EKG T AXIS: 81 DEGREES
EKG VENTRICULAR RATE: 88 BPM
EOSINOPHILS ABSOLUTE: 0.1 K/UL (ref 0–0.7)
EOSINOPHILS RELATIVE PERCENT: 0.8 %
GFR AFRICAN AMERICAN: >60
GFR NON-AFRICAN AMERICAN: >60
GLOBULIN: 3.4 G/DL (ref 2.3–3.5)
GLUCOSE BLD-MCNC: 180 MG/DL (ref 70–99)
HCT VFR BLD CALC: 45.3 % (ref 37–47)
HEMOGLOBIN: 15 G/DL (ref 12–16)
INR BLD: 2.2
LACTIC ACID: 0.7 MMOL/L (ref 0.5–2.2)
LIPASE: 35 U/L (ref 12–95)
LYMPHOCYTES ABSOLUTE: 0.7 K/UL (ref 1–4.8)
LYMPHOCYTES RELATIVE PERCENT: 7.7 %
MCH RBC QN AUTO: 29.9 PG (ref 27–31.3)
MCHC RBC AUTO-ENTMCNC: 33.2 % (ref 33–37)
MCV RBC AUTO: 90.2 FL (ref 82–100)
MONOCYTES ABSOLUTE: 0.6 K/UL (ref 0.2–0.8)
MONOCYTES RELATIVE PERCENT: 6.4 %
NEUTROPHILS ABSOLUTE: 7.7 K/UL (ref 1.4–6.5)
NEUTROPHILS RELATIVE PERCENT: 84.7 %
PDW BLD-RTO: 15.6 % (ref 11.5–14.5)
PLATELET # BLD: 245 K/UL (ref 130–400)
POTASSIUM REFLEX MAGNESIUM: 3.7 MEQ/L (ref 3.4–4.9)
PROTHROMBIN TIME: 24.4 SEC (ref 12.3–14.9)
RBC # BLD: 5.02 M/UL (ref 4.2–5.4)
SODIUM BLD-SCNC: 136 MEQ/L (ref 135–144)
TOTAL PROTEIN: 7.4 G/DL (ref 6.3–8)
TROPONIN: <0.01 NG/ML (ref 0–0.01)
WBC # BLD: 9.1 K/UL (ref 4.8–10.8)

## 2020-06-08 PROCEDURE — 74177 CT ABD & PELVIS W/CONTRAST: CPT

## 2020-06-08 PROCEDURE — 84484 ASSAY OF TROPONIN QUANT: CPT

## 2020-06-08 PROCEDURE — 96361 HYDRATE IV INFUSION ADD-ON: CPT

## 2020-06-08 PROCEDURE — 6360000004 HC RX CONTRAST MEDICATION: Performed by: EMERGENCY MEDICINE

## 2020-06-08 PROCEDURE — 83690 ASSAY OF LIPASE: CPT

## 2020-06-08 PROCEDURE — 99285 EMERGENCY DEPT VISIT HI MDM: CPT

## 2020-06-08 PROCEDURE — 93005 ELECTROCARDIOGRAM TRACING: CPT | Performed by: EMERGENCY MEDICINE

## 2020-06-08 PROCEDURE — 80053 COMPREHEN METABOLIC PANEL: CPT

## 2020-06-08 PROCEDURE — 96375 TX/PRO/DX INJ NEW DRUG ADDON: CPT

## 2020-06-08 PROCEDURE — 85025 COMPLETE CBC W/AUTO DIFF WBC: CPT

## 2020-06-08 PROCEDURE — 0D9670Z DRAINAGE OF STOMACH WITH DRAINAGE DEVICE, VIA NATURAL OR ARTIFICIAL OPENING: ICD-10-PCS | Performed by: RADIOLOGY

## 2020-06-08 PROCEDURE — 85730 THROMBOPLASTIN TIME PARTIAL: CPT

## 2020-06-08 PROCEDURE — 85610 PROTHROMBIN TIME: CPT

## 2020-06-08 PROCEDURE — 36415 COLL VENOUS BLD VENIPUNCTURE: CPT

## 2020-06-08 PROCEDURE — 6360000002 HC RX W HCPCS: Performed by: EMERGENCY MEDICINE

## 2020-06-08 PROCEDURE — 2580000003 HC RX 258: Performed by: EMERGENCY MEDICINE

## 2020-06-08 PROCEDURE — 83605 ASSAY OF LACTIC ACID: CPT

## 2020-06-08 PROCEDURE — 81001 URINALYSIS AUTO W/SCOPE: CPT

## 2020-06-08 PROCEDURE — 96374 THER/PROPH/DIAG INJ IV PUSH: CPT

## 2020-06-08 PROCEDURE — 87086 URINE CULTURE/COLONY COUNT: CPT

## 2020-06-08 RX ORDER — ONDANSETRON 2 MG/ML
4 INJECTION INTRAMUSCULAR; INTRAVENOUS ONCE
Status: COMPLETED | OUTPATIENT
Start: 2020-06-08 | End: 2020-06-08

## 2020-06-08 RX ORDER — 0.9 % SODIUM CHLORIDE 0.9 %
500 INTRAVENOUS SOLUTION INTRAVENOUS ONCE
Status: COMPLETED | OUTPATIENT
Start: 2020-06-08 | End: 2020-06-08

## 2020-06-08 RX ORDER — PROCHLORPERAZINE EDISYLATE 5 MG/ML
10 INJECTION INTRAMUSCULAR; INTRAVENOUS EVERY 6 HOURS PRN
Status: DISCONTINUED | OUTPATIENT
Start: 2020-06-08 | End: 2020-06-10 | Stop reason: HOSPADM

## 2020-06-08 RX ADMIN — SODIUM CHLORIDE 500 ML: 9 INJECTION, SOLUTION INTRAVENOUS at 19:31

## 2020-06-08 RX ADMIN — ONDANSETRON 4 MG: 2 INJECTION INTRAMUSCULAR; INTRAVENOUS at 19:02

## 2020-06-08 RX ADMIN — IOPAMIDOL 100 ML: 612 INJECTION, SOLUTION INTRAVENOUS at 20:40

## 2020-06-08 RX ADMIN — PROCHLORPERAZINE EDISYLATE 10 MG: 5 INJECTION INTRAMUSCULAR; INTRAVENOUS at 20:52

## 2020-06-08 ASSESSMENT — PAIN DESCRIPTION - DESCRIPTORS: DESCRIPTORS: ACHING

## 2020-06-08 ASSESSMENT — PAIN SCALES - GENERAL: PAINLEVEL_OUTOF10: 4

## 2020-06-08 ASSESSMENT — PAIN DESCRIPTION - ONSET: ONSET: SUDDEN

## 2020-06-08 ASSESSMENT — PAIN DESCRIPTION - FREQUENCY: FREQUENCY: CONTINUOUS

## 2020-06-08 ASSESSMENT — PAIN DESCRIPTION - PAIN TYPE: TYPE: ACUTE PAIN

## 2020-06-08 ASSESSMENT — PAIN DESCRIPTION - LOCATION: LOCATION: ABDOMEN

## 2020-06-08 NOTE — ED NOTES
Bed: 20  Expected date: 6/8/20  Expected time: 6:31 PM  Means of arrival: [facility]  Comments:  89f possible gi bleed.      Sandy Rodrigez RN  06/08/20 5725

## 2020-06-09 ENCOUNTER — APPOINTMENT (OUTPATIENT)
Dept: GENERAL RADIOLOGY | Age: 85
DRG: 390 | End: 2020-06-09
Payer: MEDICARE

## 2020-06-09 PROBLEM — K56.609 SBO (SMALL BOWEL OBSTRUCTION) (HCC): Status: ACTIVE | Noted: 2020-06-09

## 2020-06-09 LAB
ALBUMIN SERPL-MCNC: 2.9 G/DL (ref 3.5–4.6)
ALP BLD-CCNC: 86 U/L (ref 40–130)
ALT SERPL-CCNC: 18 U/L (ref 0–33)
ANION GAP SERPL CALCULATED.3IONS-SCNC: 10 MEQ/L (ref 9–15)
AST SERPL-CCNC: 27 U/L (ref 0–35)
BACTERIA: NEGATIVE /HPF
BILIRUB SERPL-MCNC: 1.1 MG/DL (ref 0.2–0.7)
BILIRUBIN URINE: NEGATIVE
BLOOD, URINE: ABNORMAL
BUN BLDV-MCNC: 15 MG/DL (ref 8–23)
C DIFF TOXIN/ANTIGEN: NORMAL
CALCIUM SERPL-MCNC: 8.5 MG/DL (ref 8.5–9.9)
CHLORIDE BLD-SCNC: 105 MEQ/L (ref 95–107)
CLARITY: CLEAR
CO2: 22 MEQ/L (ref 20–31)
COLOR: YELLOW
CREAT SERPL-MCNC: 0.78 MG/DL (ref 0.5–0.9)
EPITHELIAL CELLS, UA: ABNORMAL /HPF (ref 0–5)
GFR AFRICAN AMERICAN: >60
GFR NON-AFRICAN AMERICAN: >60
GLOBULIN: 2.8 G/DL (ref 2.3–3.5)
GLUCOSE BLD-MCNC: 164 MG/DL (ref 70–99)
GLUCOSE URINE: NEGATIVE MG/DL
HYALINE CASTS: ABNORMAL /HPF (ref 0–5)
INR BLD: 2.6
KETONES, URINE: ABNORMAL MG/DL
LACTIC ACID: 1.7 MMOL/L (ref 0.5–2.2)
LEUKOCYTE ESTERASE, URINE: ABNORMAL
MAGNESIUM: 1.7 MG/DL (ref 1.7–2.4)
NITRITE, URINE: NEGATIVE
PH UA: 5 (ref 5–9)
POTASSIUM REFLEX MAGNESIUM: 3.5 MEQ/L (ref 3.4–4.9)
PROTEIN UA: NEGATIVE MG/DL
PROTHROMBIN TIME: 27.7 SEC (ref 12.3–14.9)
RBC UA: ABNORMAL /HPF (ref 0–2)
SODIUM BLD-SCNC: 137 MEQ/L (ref 135–144)
SPECIFIC GRAVITY UA: 1.05 (ref 1–1.03)
TOTAL PROTEIN: 5.7 G/DL (ref 6.3–8)
URINE REFLEX TO CULTURE: YES
UROBILINOGEN, URINE: 0.2 E.U./DL
WBC UA: ABNORMAL /HPF (ref 0–5)

## 2020-06-09 PROCEDURE — 74250 X-RAY XM SM INT 1CNTRST STD: CPT

## 2020-06-09 PROCEDURE — 87324 CLOSTRIDIUM AG IA: CPT

## 2020-06-09 PROCEDURE — 2500000003 HC RX 250 WO HCPCS: Performed by: NURSE PRACTITIONER

## 2020-06-09 PROCEDURE — 80053 COMPREHEN METABOLIC PANEL: CPT

## 2020-06-09 PROCEDURE — 2580000003 HC RX 258: Performed by: NURSE PRACTITIONER

## 2020-06-09 PROCEDURE — 2500000003 HC RX 250 WO HCPCS: Performed by: INTERNAL MEDICINE

## 2020-06-09 PROCEDURE — 96372 THER/PROPH/DIAG INJ SC/IM: CPT

## 2020-06-09 PROCEDURE — 83735 ASSAY OF MAGNESIUM: CPT

## 2020-06-09 PROCEDURE — G0378 HOSPITAL OBSERVATION PER HR: HCPCS

## 2020-06-09 PROCEDURE — 96361 HYDRATE IV INFUSION ADD-ON: CPT

## 2020-06-09 PROCEDURE — 93010 ELECTROCARDIOGRAM REPORT: CPT | Performed by: INTERNAL MEDICINE

## 2020-06-09 PROCEDURE — 1210000000 HC MED SURG R&B

## 2020-06-09 PROCEDURE — 96375 TX/PRO/DX INJ NEW DRUG ADDON: CPT

## 2020-06-09 PROCEDURE — 83605 ASSAY OF LACTIC ACID: CPT

## 2020-06-09 PROCEDURE — 2580000003 HC RX 258: Performed by: PHYSICIAN ASSISTANT

## 2020-06-09 PROCEDURE — 36415 COLL VENOUS BLD VENIPUNCTURE: CPT

## 2020-06-09 PROCEDURE — 85610 PROTHROMBIN TIME: CPT

## 2020-06-09 PROCEDURE — 6370000000 HC RX 637 (ALT 250 FOR IP): Performed by: NURSE PRACTITIONER

## 2020-06-09 PROCEDURE — 6360000002 HC RX W HCPCS: Performed by: INTERNAL MEDICINE

## 2020-06-09 PROCEDURE — 87449 NOS EACH ORGANISM AG IA: CPT

## 2020-06-09 RX ORDER — CARVEDILOL 12.5 MG/1
25 TABLET ORAL 2 TIMES DAILY
Status: DISCONTINUED | OUTPATIENT
Start: 2020-06-09 | End: 2020-06-10 | Stop reason: HOSPADM

## 2020-06-09 RX ORDER — SODIUM CHLORIDE AND POTASSIUM CHLORIDE .9; .15 G/100ML; G/100ML
SOLUTION INTRAVENOUS CONTINUOUS
Status: DISCONTINUED | OUTPATIENT
Start: 2020-06-09 | End: 2020-06-10 | Stop reason: HOSPADM

## 2020-06-09 RX ORDER — PROMETHAZINE HYDROCHLORIDE 12.5 MG/1
12.5 TABLET ORAL EVERY 6 HOURS PRN
Status: DISCONTINUED | OUTPATIENT
Start: 2020-06-09 | End: 2020-06-10 | Stop reason: HOSPADM

## 2020-06-09 RX ORDER — ONDANSETRON 2 MG/ML
4 INJECTION INTRAMUSCULAR; INTRAVENOUS EVERY 6 HOURS PRN
Status: DISCONTINUED | OUTPATIENT
Start: 2020-06-09 | End: 2020-06-10 | Stop reason: HOSPADM

## 2020-06-09 RX ORDER — ACETAMINOPHEN 325 MG/1
650 TABLET ORAL EVERY 6 HOURS PRN
Status: DISCONTINUED | OUTPATIENT
Start: 2020-06-09 | End: 2020-06-10 | Stop reason: HOSPADM

## 2020-06-09 RX ORDER — SODIUM CHLORIDE 0.9 % (FLUSH) 0.9 %
10 SYRINGE (ML) INJECTION EVERY 12 HOURS SCHEDULED
Status: DISCONTINUED | OUTPATIENT
Start: 2020-06-09 | End: 2020-06-10 | Stop reason: HOSPADM

## 2020-06-09 RX ORDER — POLYETHYLENE GLYCOL 3350 17 G/17G
17 POWDER, FOR SOLUTION ORAL DAILY PRN
Status: DISCONTINUED | OUTPATIENT
Start: 2020-06-09 | End: 2020-06-10 | Stop reason: HOSPADM

## 2020-06-09 RX ORDER — SODIUM CHLORIDE 0.9 % (FLUSH) 0.9 %
10 SYRINGE (ML) INJECTION PRN
Status: DISCONTINUED | OUTPATIENT
Start: 2020-06-09 | End: 2020-06-10 | Stop reason: HOSPADM

## 2020-06-09 RX ORDER — AMLODIPINE BESYLATE 5 MG/1
2.5 TABLET ORAL DAILY
Status: DISCONTINUED | OUTPATIENT
Start: 2020-06-09 | End: 2020-06-10 | Stop reason: HOSPADM

## 2020-06-09 RX ORDER — ACETAMINOPHEN 650 MG/1
650 SUPPOSITORY RECTAL EVERY 6 HOURS PRN
Status: DISCONTINUED | OUTPATIENT
Start: 2020-06-09 | End: 2020-06-10 | Stop reason: HOSPADM

## 2020-06-09 RX ORDER — SODIUM CHLORIDE 9 MG/ML
INJECTION, SOLUTION INTRAVENOUS CONTINUOUS
Status: DISCONTINUED | OUTPATIENT
Start: 2020-06-09 | End: 2020-06-09

## 2020-06-09 RX ADMIN — Medication 10 ML: at 20:20

## 2020-06-09 RX ADMIN — FAMOTIDINE 20 MG: 10 INJECTION INTRAVENOUS at 09:52

## 2020-06-09 RX ADMIN — CARVEDILOL 25 MG: 12.5 TABLET, FILM COATED ORAL at 20:39

## 2020-06-09 RX ADMIN — SODIUM CHLORIDE: 9 INJECTION, SOLUTION INTRAVENOUS at 04:15

## 2020-06-09 RX ADMIN — ENOXAPARIN SODIUM 40 MG: 40 INJECTION SUBCUTANEOUS at 09:52

## 2020-06-09 RX ADMIN — SODIUM CHLORIDE: 9 INJECTION, SOLUTION INTRAVENOUS at 00:18

## 2020-06-09 RX ADMIN — BARIUM SULFATE 352 G: 960 POWDER, FOR SUSPENSION ORAL at 14:16

## 2020-06-09 RX ADMIN — POTASSIUM CHLORIDE AND SODIUM CHLORIDE: 900; 150 INJECTION, SOLUTION INTRAVENOUS at 11:48

## 2020-06-09 RX ADMIN — POTASSIUM CHLORIDE AND SODIUM CHLORIDE: 900; 150 INJECTION, SOLUTION INTRAVENOUS at 20:39

## 2020-06-09 ASSESSMENT — PAIN DESCRIPTION - LOCATION: LOCATION: ABDOMEN

## 2020-06-09 ASSESSMENT — PAIN SCALES - GENERAL: PAINLEVEL_OUTOF10: 2

## 2020-06-09 NOTE — PROGRESS NOTES
Pt has had 3 loose, watery BM since arrival to the floor. Hospitalist notified. Will continue to monitor.  Electronically signed by Zenaida Owusu RN on 6/9/20 at 3:43 AM EDT

## 2020-06-09 NOTE — PROGRESS NOTES
Hospitalist Progress Note      PCP: Kierra York MD    Date of Admission: 6/8/2020    Chief Complaint: weakness    Subjective: pt awake, alert, had several watery BM over night     Medications:  Reviewed    Infusion Medications    0.9% NaCl with KCl 20 mEq       Scheduled Medications    sodium chloride flush  10 mL Intravenous 2 times per day    famotidine (PEPCID) injection  20 mg Intravenous BID    amLODIPine  2.5 mg Oral Daily    carvedilol  25 mg Oral BID     PRN Meds: sodium chloride flush, acetaminophen **OR** acetaminophen, polyethylene glycol, promethazine **OR** ondansetron, prochlorperazine      Intake/Output Summary (Last 24 hours) at 6/9/2020 1037  Last data filed at 6/9/2020 0504  Gross per 24 hour   Intake --   Output 100 ml   Net -100 ml       Exam:    BP (!) 150/75   Pulse 92   Temp 99 °F (37.2 °C) (Oral)   Resp 18   Ht 5' 3\" (1.6 m)   Wt 194 lb 11.2 oz (88.3 kg)   SpO2 96%   BMI 34.49 kg/m²     General appearance: No apparent distress, appears stated age and cooperative. HEENT: Pupils equal, round, and reactive to light. Conjunctivae/corneas clear. Neck: Supple, with full range of motion. No jugular venous distention. Trachea midline. Respiratory:  Normal respiratory effort. Clear to auscultation, bilaterally without Rales/Wheezes/Rhonchi. Cardiovascular: Regular rate and rhythm with normal S1/S2 without murmurs, rubs or gallops. Abdomen: Soft, non-tender, non-distended with normal bowel sounds. Musculoskeletal: No clubbing, cyanosis or edema bilaterally. .  Skin: Skin color, texture, turgor normal.  No rashes or lesions. Neurologic:  Neurovascularly intact without any focal sensory/motor deficits.     Psychiatric: Alert and oriented, thought content appropriate, normal insight  Capillary Refill: Brisk,< 3 seconds   Peripheral Pulses: +2 palpable, equal bilaterally       Labs:   Recent Labs     06/08/20  1900   WBC 9.1   HGB 15.0   HCT 45.3        Recent Labs 06/08/20  1900 06/09/20  0700    137   K 3.7 3.5   CL 99 105   CO2 24 22   BUN 12 15   CREATININE 0.73 0.78   CALCIUM 10.1* 8.5     Recent Labs     06/08/20  1900 06/09/20  0700   AST 27 27   ALT 14 18   BILITOT 0.8* 1.1*   ALKPHOS 115 86     Recent Labs     06/08/20  2115 06/09/20  0700   INR 2.2 2.6     Recent Labs     06/08/20  1900   TROPONINI <0.010       Urinalysis:      Lab Results   Component Value Date    NITRU Negative 06/08/2020    WBCUA 10-20 06/08/2020    BACTERIA Negative 06/08/2020    RBCUA 0-2 06/08/2020    BLOODU TRACE 06/08/2020    SPECGRAV 1.053 06/08/2020    GLUCOSEU Negative 06/08/2020       Radiology:  CT ABDOMEN PELVIS W IV CONTRAST Additional Contrast? None   Final Result   1. MULTIPLY DILATED LOOPS OF SMALL BOWEL WITH SCATTERED AIR-FLUID LEVELS. FINDINGS ARE THAT OF PROBABLE SMALL BOWEL OBSTRUCTION. 2. STOMACH IS DISTENDED. CONSIDER DECOMPRESSION. 3. FREE FLUID IN THE ABDOMEN/PELVIS. 4. NONOBSTRUCTING RIGHT RENAL CALCULI         All CT scans at this facility use dose modulation, iterative reconstruction, and/or weight based dosing when appropriate to reduce radiation dose to as low as reasonably achievable. FL UGI W SMALL BOWEL W DOUBLE CONTRAST    (Results Pending)           Assessment/Plan:    Active Hospital Problems    Diagnosis Date Noted    [facility] ([facility] [K56.609] 06/09/2020    PAF (paroxysmal atrial fibrillation) ([facility] [I48.0] 01/05/2017    HTN (hypertension) [I10] 01/26/2011         DVT Prophylaxis: coumadin  Diet: Diet NPO Effective Now  Code Status: Full Code    PT/OT Eval Status:     Dispo - SBO- had multiple BM over night, no nausea, NG tube in place- will perform SB series X ray today, surgery. GI on case  HTN- controlled  History of DVT/PE x 2- full anticoagulation, INR therapeutic   Morbid obesity with BMI 34% due to excessive calory intake- supportive care   Medically stable, will follow along with consultants       Electronically

## 2020-06-09 NOTE — H&P
[facility]ICINE    HISTORY AND PHYSICAL EXAM    PATIENT NAME:  Cal Nichols    MRN:  [id number]  SERVICE DATE:  6/9/2020   SERVICE TIME:  2:46 AM    Primary Care Physician: Yael Fall MD         SUBJECTIVE  CHIEF COMPLAINT:    Abdominal and back pain,  N/V    HPI:  This is a 80 y.o. female w PMH HTN, HLD, PE, OA  Presents w N/V for past 12 hours, abdominal pain is described as diffuse, and back pain in lumbar area. She feels her abdomen is distended. She had 3 episodes of vomiting. She has no fever, chills,. Emesis was green bile,  No blood. Pain is described as dull and constant. CT abdomen w distended stomach, SBO, free fluid abd/pelvis   She is admitted for management of small bowel obstruction.         PAST MEDICAL HISTORY:    Past Medical History:   Diagnosis Date    Arthritis     Disease of blood and blood forming organ     DVT (deep vein thrombosis) in pregnancy     History of blood transfusion     Hx of blood clots     Pulmonary    Hyperlipidemia 1/26/2011    Hypertension     PONV (postoperative nausea and vomiting)     vomiting    Presence of IVC filter     Pulmonary embolism (HCC)     Wrist fracture, right      PAST SURGICAL HISTORY:    Past Surgical History:   Procedure Laterality Date    APPENDECTOMY      CATARACT REMOVAL Bilateral     CHOLECYSTECTOMY      EYE SURGERY      [employer] bilateral implants    HEMORRHOID SURGERY      HYSTERECTOMY      KNEE SURGERY Right     LA TOTAL KNEE ARTHROPLASTY Left 1/9/2018    LT KNEE TOTAL ARTHROPLASTY SUPINE, TANESHA PSI, AM ADMIT, SPINAL performed by Gabriela Nam MD at [facility] Right      FAMILY HISTORY:    Family History   Problem Relation Age of Onset    Cancer Mother     Cancer Father     Cancer Sister         colon    Cancer Brother         colon    Arthritis Daughter     Arthritis Son      SOCIAL HISTORY:    Social History     Socioeconomic History    Marital status:  tablet Take 30 mg by mouth  10/14/16  Yes Historical Provider, MD   potassium chloride (KLOR-CON M) 10 MEQ extended release tablet Take 10 mEq by mouth 2 times daily  7/21/16  Yes Historical Provider, MD   warfarin (COUMADIN) 2.5 MG tablet 3.75mg lorna alvarez 10/31/16  Yes Historical Provider, MD   clonazePAM (KLONOPIN) 0.5 MG tablet Take 1 tablet by mouth 2 times daily as needed for Anxiety for up to 2 days. 1/12/18 1/14/18  Radha Bird MD       ALLERGIES: Celebrex [celecoxib]; Cymbalta [duloxetine hcl];  Diltiazem; and Lisinopril    REVIEW OF SYSTEM:   Review of Systems - History obtained from the patient  General ROS: positive for  - fatigue  negative for - chills, fever or sleep disturbance  Psychological ROS: positive for anxiety, negative for - memory difficulties  Hematological and Lymphatic ROS: negative for - bleeding problems, jaundice or weight loss  Respiratory ROS: no cough, shortness of breath, or wheezing  Cardiovascular ROS: no chest pain or dyspnea on exertion  Gastrointestinal ROS: positive for - abdominal pain and nausea/vomiting  negative for - constipation, diarrhea, heartburn or melena  Musculoskeletal ROS: negative for - joint pain, joint stiffness or joint swelling  Neurological ROS: no TIA or stroke symptoms  Dermatological ROS: negative for - pruritus or rash  OBJECTIVE  PHYSICAL EXAM: BP (!) 150/75   Pulse 92   Temp 99 °F (37.2 °C) (Oral)   Resp 18   Ht 5' 3\" (1.6 m)   Wt 200 lb (90.7 kg)   SpO2 96%   BMI 35.43 kg/m²     CONSTITUTIONAL:  awake, alert, cooperative, no apparent distress, and appears stated age  EYES:  pupils equal, round and reactive to light, sclera clear and conjunctiva normal  ENT:  Normocephalic,  oral pharynx with moist mucus membranes,  NECK:  supple, symmetrical, trachea midline, no lymphadenopathy, thyroid not enlarged, symmetric, no tenderness, no jugular venous distension and no carotid bruits  LUNGS:  no increased work of breathing, good air exchange and Magnesium 3.7 3.4 - 4.9 mEq/L    Chloride 99 95 - 107 mEq/L    CO2 24 20 - 31 mEq/L    Anion Gap 13 9 - 15 mEq/L    Glucose 180 (H) 70 - 99 mg/dL    BUN 12 8 - 23 mg/dL    CREATININE 0.73 0.50 - 0.90 mg/dL    GFR Non-African American >60.0 >60    GFR  >60.0 >60    Calcium 10.1 (H) 8.5 - 9.9 mg/dL    Total Protein 7.4 6.3 - 8.0 g/dL    Alb 4.0 3.5 - 4.6 g/dL    Total Bilirubin 0.8 (H) 0.2 - 0.7 mg/dL    Alkaline Phosphatase 115 40 - 130 U/L    ALT 14 0 - 33 U/L    AST 27 0 - 35 U/L    Globulin 3.4 2.3 - 3.5 g/dL   Lipase    Collection Time: 06/08/20  7:00 PM   Result Value Ref Range    Lipase 35 12 - 95 U/L   Lactic Acid, Plasma    Collection Time: 06/08/20  7:00 PM   Result Value Ref Range    Lactic Acid 0.7 0.5 - 2.2 mmol/L   Troponin    Collection Time: 06/08/20  7:00 PM   Result Value Ref Range    Troponin <0.010 0.000 - 0.010 ng/mL   Microscopic Urinalysis    Collection Time: 06/08/20  7:00 PM   Result Value Ref Range    RBC, UA 0-2 0 - 2 /HPF    Bacteria, UA Negative Negative /HPF    Hyaline Casts, UA 3-5 0 - 5 /HPF    WBC, UA 10-20 (A) 0 - 5 /HPF    Epithelial Cells, UA 3-5 0 - 5 /HPF   EKG 12 Lead    Collection Time: 06/08/20  7:10 PM   Result Value Ref Range    Ventricular Rate 88 BPM    Atrial Rate 88 BPM    P-R Interval 162 ms    QRS Duration 78 ms    Q-T Interval 350 ms    QTc Calculation (Bazett) 423 ms    P Axis 13 degrees    R Axis -51 degrees    T Axis 81 degrees   Protime-INR    Collection Time: 06/08/20  9:15 PM   Result Value Ref Range    Protime 24.4 (H) 12.3 - 14.9 sec    INR 2.2    APTT    Collection Time: 06/08/20  9:15 PM   Result Value Ref Range    aPTT 37.8 (H) 24.4 - 36.8 sec       IMAGING:  Ct Abdomen Pelvis W Iv Contrast Additional Contrast? None    Result Date: 6/8/2020  Examination: CT ABDOMEN PELVIS W IV CONTRAST Indication:   Nausea and abdominal pain Technique: Multiple serial axial images was performed through the abdomen and pelvis utilizing 100cc of Optiray 370. Images were reconstructed in the axial and coronal and sagittal planes. Comparison:  No comparison is available. Findings: There is compressive atelectasis/scarring seen in the right lower lobe superimposed upon a elevated right hemidiaphragm. No pleural effusions. The liver shows mild intrahepatic biliary dilatation. Reflect postcholecystectomy changes. , spleen, pancreas, adrenals,  are unremarkable. The gallbladder surgically absent. There is a 2 3 mm nonobstructing calculi at the inferior pole of the right kidney. The left kidney is unremarkable. No bladder calculi. There are multiple fluid-filled loops of small bowel. Large bowel is largely decompressed. Stomach is noted to be distended. Findings suggestive of that of small bowel obstruction. The appendix is surgically absent. .  No diverticulitis. There is a small to moderate hiatal hernia. There is fluid seen within the hiatal hernia and within the lower esophagus. Could suggest component of reflux. The uterus is surgically absent. No free air. There is free fluid seen within the inferior aspect of the right paracolic gutter extends into the pelvis. The visualized abdominal aorta is of normal size and caliber. No significant retroperitoneal adenopathy. Right-sided IVC filter. There is multilevel degenerative changes of the visualized thoracolumbar spine. 1. MULTIPLY DILATED LOOPS OF SMALL BOWEL WITH SCATTERED AIR-FLUID LEVELS. FINDINGS ARE THAT OF PROBABLE SMALL BOWEL OBSTRUCTION. 2. STOMACH IS DISTENDED. CONSIDER DECOMPRESSION. 3. FREE FLUID IN THE ABDOMEN/PELVIS. 4. NONOBSTRUCTING RIGHT RENAL CALCULI All CT scans at this facility use dose modulation, iterative reconstruction, and/or weight based dosing when appropriate to reduce radiation dose to as low as reasonably achievable.       VTE Prophylaxis: low molecular weight heparin -  start    ASSESSMENT AND PLAN  Active Problems:    SBO (small bowel obstruction) (HCC)   Acute onset  Abdominal pain  N/V. Abdomen distended,  Diffuse discomfort,  Bile emesis. CT consistent w SBO,      Plan:  admit,  NG  NPO   IV fluids. GI consult. HTN (hypertension)    Chronic  Stable on meds. Amlodipine, carvedilol    BP OK. No CP, dizziness, HA, blurred vision   Does have LE edema. EKG  NSR. Plan:   Restart meds,  monitor BP  Daily labs. PAF (paroxysmal atrial fibrillation) ([facility]    Ongoing   Has maintained NSR,   On coumadin w INR, 2.2    Rate is well controlled  No palpitations  Plan: continue betablocker,   Hold warfarin until seen by GI    Can continue if no plan for GI intervention.          Plan of care discussed with: patient    SIGNATURE: ALISE Schaffer - CNP  DATE: June 9, 2020  TIME: 2:46 AM     Allie Hernandez MD - supervising physician

## 2020-06-09 NOTE — PLAN OF CARE
Problem: Falls - Risk of:  Goal: Will remain free from falls  Description: Will remain free from falls  Outcome: Ongoing  Goal: Absence of physical injury  Description: Absence of physical injury  Outcome: Ongoing     Problem: Falls - Risk of:  Goal: Will remain free from falls  Description: Will remain free from falls  Outcome: Ongoing  Goal: Absence of physical injury  Description: Absence of physical injury  Outcome: Ongoing     Problem: Falls - Risk of:  Goal: Will remain free from falls  Description: Will remain free from falls  Outcome: Ongoing  Goal: Absence of physical injury  Description: Absence of physical injury  Outcome: Ongoing

## 2020-06-10 VITALS
HEART RATE: 85 BPM | WEIGHT: 194.7 LBS | OXYGEN SATURATION: 91 % | HEIGHT: 63 IN | RESPIRATION RATE: 16 BRPM | TEMPERATURE: 98.2 F | SYSTOLIC BLOOD PRESSURE: 141 MMHG | DIASTOLIC BLOOD PRESSURE: 66 MMHG | BODY MASS INDEX: 34.5 KG/M2

## 2020-06-10 LAB
ALBUMIN SERPL-MCNC: 2.8 G/DL (ref 3.5–4.6)
ALP BLD-CCNC: 69 U/L (ref 40–130)
ALT SERPL-CCNC: 14 U/L (ref 0–33)
ANION GAP SERPL CALCULATED.3IONS-SCNC: 6 MEQ/L (ref 9–15)
AST SERPL-CCNC: 31 U/L (ref 0–35)
BASOPHILS ABSOLUTE: 0 K/UL (ref 0–0.2)
BASOPHILS RELATIVE PERCENT: 0.3 %
BILIRUB SERPL-MCNC: 0.7 MG/DL (ref 0.2–0.7)
BUN BLDV-MCNC: 13 MG/DL (ref 8–23)
CALCIUM SERPL-MCNC: 8.7 MG/DL (ref 8.5–9.9)
CHLORIDE BLD-SCNC: 110 MEQ/L (ref 95–107)
CO2: 24 MEQ/L (ref 20–31)
CREAT SERPL-MCNC: 0.68 MG/DL (ref 0.5–0.9)
EOSINOPHILS ABSOLUTE: 0.2 K/UL (ref 0–0.7)
EOSINOPHILS RELATIVE PERCENT: 2.8 %
GFR AFRICAN AMERICAN: >60
GFR NON-AFRICAN AMERICAN: >60
GLOBULIN: 2.6 G/DL (ref 2.3–3.5)
GLUCOSE BLD-MCNC: 82 MG/DL (ref 70–99)
HCT VFR BLD CALC: 37.9 % (ref 37–47)
HEMOGLOBIN: 12.3 G/DL (ref 12–16)
INR BLD: 2.6
LYMPHOCYTES ABSOLUTE: 1 K/UL (ref 1–4.8)
LYMPHOCYTES RELATIVE PERCENT: 16 %
MCH RBC QN AUTO: 29.6 PG (ref 27–31.3)
MCHC RBC AUTO-ENTMCNC: 32.4 % (ref 33–37)
MCV RBC AUTO: 91.2 FL (ref 82–100)
MONOCYTES ABSOLUTE: 0.7 K/UL (ref 0.2–0.8)
MONOCYTES RELATIVE PERCENT: 10.8 %
NEUTROPHILS ABSOLUTE: 4.3 K/UL (ref 1.4–6.5)
NEUTROPHILS RELATIVE PERCENT: 70.1 %
PDW BLD-RTO: 16.3 % (ref 11.5–14.5)
PLATELET # BLD: 177 K/UL (ref 130–400)
POTASSIUM REFLEX MAGNESIUM: 3.7 MEQ/L (ref 3.4–4.9)
PROTHROMBIN TIME: 27.4 SEC (ref 12.3–14.9)
RBC # BLD: 4.16 M/UL (ref 4.2–5.4)
SODIUM BLD-SCNC: 140 MEQ/L (ref 135–144)
TOTAL PROTEIN: 5.4 G/DL (ref 6.3–8)
URINE CULTURE, ROUTINE: NORMAL
WBC # BLD: 6.1 K/UL (ref 4.8–10.8)

## 2020-06-10 PROCEDURE — G0378 HOSPITAL OBSERVATION PER HR: HCPCS

## 2020-06-10 PROCEDURE — 80053 COMPREHEN METABOLIC PANEL: CPT

## 2020-06-10 PROCEDURE — 2500000003 HC RX 250 WO HCPCS: Performed by: NURSE PRACTITIONER

## 2020-06-10 PROCEDURE — 99221 1ST HOSP IP/OBS SF/LOW 40: CPT | Performed by: COLON & RECTAL SURGERY

## 2020-06-10 PROCEDURE — 36415 COLL VENOUS BLD VENIPUNCTURE: CPT

## 2020-06-10 PROCEDURE — 6360000002 HC RX W HCPCS: Performed by: INTERNAL MEDICINE

## 2020-06-10 PROCEDURE — 85610 PROTHROMBIN TIME: CPT

## 2020-06-10 PROCEDURE — 96376 TX/PRO/DX INJ SAME DRUG ADON: CPT

## 2020-06-10 PROCEDURE — 85025 COMPLETE CBC W/AUTO DIFF WBC: CPT

## 2020-06-10 PROCEDURE — 6370000000 HC RX 637 (ALT 250 FOR IP): Performed by: NURSE PRACTITIONER

## 2020-06-10 RX ADMIN — POTASSIUM CHLORIDE AND SODIUM CHLORIDE: 900; 150 INJECTION, SOLUTION INTRAVENOUS at 04:58

## 2020-06-10 RX ADMIN — AMLODIPINE BESYLATE 2.5 MG: 5 TABLET ORAL at 10:44

## 2020-06-10 RX ADMIN — CARVEDILOL 25 MG: 12.5 TABLET, FILM COATED ORAL at 10:44

## 2020-06-10 RX ADMIN — FAMOTIDINE 20 MG: 10 INJECTION INTRAVENOUS at 10:44

## 2020-06-10 NOTE — CONSULTS
ears without lesions, oral pharynx with moist mucus membranes, tonsils without erythema or exudates, gums normal and good dentition. NECK:  Supple, symmetrical, trachea midline, no adenopathy, thyroid symmetric, not enlarged and no tenderness, skin normal  BACK:  Symmetric, no curvature, spinous processes are non-tender on palpation, paraspinous muscles are non-tender on palpation, no costal vertebral tenderness  LUNGS:  No increased work of breathing, good air exchange, clear to auscultation bilaterally, no crackles or wheezing  CARDIOVASCULAR:  Normal apical impulse, regular rate and rhythm, normal S1 and S2, no S3 or S4, and no murmur noted  ABDOMEN: Abdomen soft nontender and nondistended. No abdominal wall hernias present. MUSCULOSKELETAL:  There is no redness, warmth, or swelling of the joints. Full range of motion noted. Motor strength is 5 out of 5 all extremities bilaterally. Tone is normal.  NEUROLOGIC: Awake and alert  SKIN:  no bruising or bleeding  DATA:    CBC:   Lab Results   Component Value Date    WBC 6.1 06/10/2020    RBC 4.16 06/10/2020    HGB 12.3 06/10/2020    HCT 37.9 06/10/2020    MCV 91.2 06/10/2020    MCH 29.6 06/10/2020    MCHC 32.4 06/10/2020    RDW 16.3 06/10/2020     06/10/2020    MPV 9.2 05/17/2015     CMP:    Lab Results   Component Value Date     06/10/2020    K 3.7 06/10/2020     06/10/2020    CO2 24 06/10/2020    BUN 13 06/10/2020    CREATININE 0.68 06/10/2020    GFRAA >60.0 06/10/2020    LABGLOM >60.0 06/10/2020    GLUCOSE 82 06/10/2020    PROT 5.4 06/10/2020    LABALBU 2.8 06/10/2020    CALCIUM 8.7 06/10/2020    BILITOT 0.7 06/10/2020    ALKPHOS 69 06/10/2020    AST 31 06/10/2020    ALT 14 06/10/2020     Radiology Review: CT scan and small bowel series as described above diet advanced. IMPRESSION/RECOMMENDATIONS:       No surgical issues noted. No evidence of bowel obstruction seen on dynamic small bowel series.     Patient may be discharged at the

## 2020-06-10 NOTE — DISCHARGE SUMMARY
Hospital Medicine Discharge Summary    Gunner Sargent  :  1929  MRN:  [id number]    Admit date:  2020  Discharge date:  6/10/2020    Admitting Physician:  Mya Mares MD  Primary Care Physician:  Evelyn Brown MD    Gunner Sargent is a 80 y.o. female that was admitted and treated at [facility] for the following medical issues: Active Problems:    HTN (hypertension)    PAF (paroxysmal atrial fibrillation) (HCC)    SBO (small bowel obstruction) (HCC)  Resolved Problems:    * No resolved hospital problems. *      Discharge Diagnoses: Active Problems:    HTN (hypertension)    PAF (paroxysmal atrial fibrillation) (HCC)    SBO (small bowel obstruction) (HCC)  Resolved Problems:    * No resolved hospital problems. *    Chief Complaint   Patient presents with    Nausea       Hospital Course:   Gunner Sargent is a 80 y.o. female who was admitted to the hospital with nausea vomiting. Possible small bowel obstruction, NG tube inserted. Significant improvement within 24 hours, Seen by general surgery and cleared for discharge from their perspective subsequently discharged with outpatient follow-up. Pt was discharge in a stable condition. BP (!) 141/66   Pulse 85   Temp 98.2 °F (36.8 °C) (Oral)   Resp 16   Ht 5' 3\" (1.6 m)   Wt 194 lb 11.2 oz (88.3 kg)   SpO2 91%   BMI 34.49 kg/m²     Patient was seen by the following consultants while admitted to [facility]:   Consults:  IP CONSULT TO GI  IP CONSULT TO GENERAL SURGERY    Significant Diagnostic Studies:    Ct Abdomen Pelvis W Iv Contrast Additional Contrast? None    Result Date: 2020  Examination: CT ABDOMEN PELVIS W IV CONTRAST Indication:   Nausea and abdominal pain Technique: Multiple serial axial images was performed through the abdomen and pelvis utilizing 100cc of Optiray 370. Images were reconstructed in the axial and coronal and sagittal planes.  Comparison:  No comparison is available. Findings: There is compressive atelectasis/scarring seen in the right lower lobe superimposed upon a elevated right hemidiaphragm. No pleural effusions. The liver shows mild intrahepatic biliary dilatation. Reflect postcholecystectomy changes. , spleen, pancreas, adrenals,  are unremarkable. The gallbladder surgically absent. There is a 2 3 mm nonobstructing calculi at the inferior pole of the right kidney. The left kidney is unremarkable. No bladder calculi. There are multiple fluid-filled loops of small bowel. Large bowel is largely decompressed. Stomach is noted to be distended. Findings suggestive of that of small bowel obstruction. The appendix is surgically absent. .  No diverticulitis. There is a small to moderate hiatal hernia. There is fluid seen within the hiatal hernia and within the lower esophagus. Could suggest component of reflux. The uterus is surgically absent. No free air. There is free fluid seen within the inferior aspect of the right paracolic gutter extends into the pelvis. The visualized abdominal aorta is of normal size and caliber. No significant retroperitoneal adenopathy. Right-sided IVC filter. There is multilevel degenerative changes of the visualized thoracolumbar spine. 1. MULTIPLY DILATED LOOPS OF SMALL BOWEL WITH SCATTERED AIR-FLUID LEVELS. FINDINGS ARE THAT OF PROBABLE SMALL BOWEL OBSTRUCTION. 2. STOMACH IS DISTENDED. CONSIDER DECOMPRESSION. 3. FREE FLUID IN THE ABDOMEN/PELVIS. 4. NONOBSTRUCTING RIGHT RENAL CALCULI All CT scans at this facility use dose modulation, iterative reconstruction, and/or weight based dosing when appropriate to reduce radiation dose to as low as reasonably achievable.     Fl Small Bowel Follow Through Only    Result Date: 6/9/2020  EXAMINATION: SMALL BOWEL FOLLOW-THROUGH CLINICAL HISTORY: ABDOMINAL PAIN WITH NAUSEA, EVALUATE POSSIBLE SMALL BOWEL OBSTRUCTION COMPARISONS: 6/8/2020 CT ABDOMEN/PELVIS FINDINGS: The preliminary  film of the

## 2020-07-10 ENCOUNTER — HOSPITAL ENCOUNTER (OUTPATIENT)
Age: 85
Setting detail: OBSERVATION
Discharge: HOME | End: 2020-07-13
Attending: EMERGENCY MEDICINE | Admitting: EMERGENCY MEDICINE
Payer: MEDICARE

## 2020-07-10 PROCEDURE — 99285 EMERGENCY DEPT VISIT HI MDM: CPT

## 2020-07-10 ASSESSMENT — PAIN DESCRIPTION - DESCRIPTORS: DESCRIPTORS: ACHING

## 2020-07-10 ASSESSMENT — PAIN DESCRIPTION - FREQUENCY: FREQUENCY: CONTINUOUS

## 2020-07-10 ASSESSMENT — PAIN DESCRIPTION - ONSET: ONSET: ON-GOING

## 2020-07-10 ASSESSMENT — PAIN DESCRIPTION - LOCATION: LOCATION: ABDOMEN

## 2020-07-10 ASSESSMENT — PAIN DESCRIPTION - ORIENTATION: ORIENTATION: LEFT;RIGHT;LOWER

## 2020-07-10 ASSESSMENT — PAIN SCALES - GENERAL: PAINLEVEL_OUTOF10: 8

## 2020-07-11 ENCOUNTER — APPOINTMENT (OUTPATIENT)
Dept: GENERAL RADIOLOGY | Age: 85
End: 2020-07-11
Payer: MEDICARE

## 2020-07-11 ENCOUNTER — APPOINTMENT (OUTPATIENT)
Dept: CT IMAGING | Age: 85
End: 2020-07-11
Payer: MEDICARE

## 2020-07-11 LAB
ALBUMIN SERPL-MCNC: 3.7 G/DL (ref 3.5–4.6)
ALP BLD-CCNC: 116 U/L (ref 40–130)
ALT SERPL-CCNC: 10 U/L (ref 0–33)
AMYLASE: 63 U/L (ref 22–93)
ANION GAP SERPL CALCULATED.3IONS-SCNC: 9 MEQ/L (ref 9–15)
AST SERPL-CCNC: 19 U/L (ref 0–35)
BACTERIA: NEGATIVE /HPF
BASOPHILS ABSOLUTE: 0 K/UL (ref 0–0.2)
BASOPHILS RELATIVE PERCENT: 0.3 %
BILIRUB SERPL-MCNC: 1 MG/DL (ref 0.2–0.7)
BILIRUBIN URINE: NEGATIVE
BLOOD, URINE: ABNORMAL
BUN BLDV-MCNC: 11 MG/DL (ref 8–23)
CALCIUM SERPL-MCNC: 9.6 MG/DL (ref 8.5–9.9)
CHLORIDE BLD-SCNC: 99 MEQ/L (ref 95–107)
CLARITY: CLEAR
CO2: 26 MEQ/L (ref 20–31)
COLOR: ABNORMAL
CREAT SERPL-MCNC: 0.76 MG/DL (ref 0.5–0.9)
CRYSTALS, UA: ABNORMAL /HPF
EOSINOPHILS ABSOLUTE: 0 K/UL (ref 0–0.7)
EOSINOPHILS RELATIVE PERCENT: 0.4 %
EPITHELIAL CELLS, UA: ABNORMAL /HPF (ref 0–5)
GFR AFRICAN AMERICAN: >60
GFR AFRICAN AMERICAN: >60
GFR NON-AFRICAN AMERICAN: >60
GFR NON-AFRICAN AMERICAN: >60
GLOBULIN: 3.4 G/DL (ref 2.3–3.5)
GLUCOSE BLD-MCNC: 183 MG/DL (ref 70–99)
GLUCOSE URINE: NEGATIVE MG/DL
HCT VFR BLD CALC: 45.3 % (ref 37–47)
HEMOGLOBIN: 14.7 G/DL (ref 12–16)
HYALINE CASTS: ABNORMAL /HPF (ref 0–5)
KETONES, URINE: 15 MG/DL
LACTIC ACID: 1.1 MMOL/L (ref 0.5–2.2)
LEUKOCYTE ESTERASE, URINE: ABNORMAL
LIPASE: 31 U/L (ref 12–95)
LYMPHOCYTES ABSOLUTE: 0.6 K/UL (ref 1–4.8)
LYMPHOCYTES RELATIVE PERCENT: 5.5 %
MCH RBC QN AUTO: 29.4 PG (ref 27–31.3)
MCHC RBC AUTO-ENTMCNC: 32.5 % (ref 33–37)
MCV RBC AUTO: 90.5 FL (ref 82–100)
MONOCYTES ABSOLUTE: 0.6 K/UL (ref 0.2–0.8)
MONOCYTES RELATIVE PERCENT: 4.6 %
NEUTROPHILS ABSOLUTE: 10.6 K/UL (ref 1.4–6.5)
NEUTROPHILS RELATIVE PERCENT: 89.2 %
NITRITE, URINE: NEGATIVE
PDW BLD-RTO: 15.7 % (ref 11.5–14.5)
PERFORMED ON: NORMAL
PH UA: 5 (ref 5–9)
PLATELET # BLD: 232 K/UL (ref 130–400)
POC CREATININE: 0.8 MG/DL (ref 0.6–1.2)
POC SAMPLE TYPE: NORMAL
POTASSIUM SERPL-SCNC: 4.2 MEQ/L (ref 3.4–4.9)
PROTEIN UA: ABNORMAL MG/DL
RBC # BLD: 5 M/UL (ref 4.2–5.4)
SODIUM BLD-SCNC: 134 MEQ/L (ref 135–144)
SPECIFIC GRAVITY UA: 1.02 (ref 1–1.03)
TOTAL PROTEIN: 7.1 G/DL (ref 6.3–8)
UROBILINOGEN, URINE: 0.2 E.U./DL
WBC # BLD: 11.9 K/UL (ref 4.8–10.8)
WBC UA: ABNORMAL /HPF (ref 0–5)

## 2020-07-11 PROCEDURE — G0378 HOSPITAL OBSERVATION PER HR: HCPCS

## 2020-07-11 PROCEDURE — 85025 COMPLETE CBC W/AUTO DIFF WBC: CPT

## 2020-07-11 PROCEDURE — 83605 ASSAY OF LACTIC ACID: CPT

## 2020-07-11 PROCEDURE — 81001 URINALYSIS AUTO W/SCOPE: CPT

## 2020-07-11 PROCEDURE — 2580000003 HC RX 258: Performed by: NURSE PRACTITIONER

## 2020-07-11 PROCEDURE — 82150 ASSAY OF AMYLASE: CPT

## 2020-07-11 PROCEDURE — 87086 URINE CULTURE/COLONY COUNT: CPT

## 2020-07-11 PROCEDURE — 1210000000 HC MED SURG R&B

## 2020-07-11 PROCEDURE — 71045 X-RAY EXAM CHEST 1 VIEW: CPT

## 2020-07-11 PROCEDURE — 74177 CT ABD & PELVIS W/CONTRAST: CPT

## 2020-07-11 PROCEDURE — 96372 THER/PROPH/DIAG INJ SC/IM: CPT

## 2020-07-11 PROCEDURE — 6360000002 HC RX W HCPCS: Performed by: NURSE PRACTITIONER

## 2020-07-11 PROCEDURE — 99221 1ST HOSP IP/OBS SF/LOW 40: CPT | Performed by: COLON & RECTAL SURGERY

## 2020-07-11 PROCEDURE — 6360000004 HC RX CONTRAST MEDICATION: Performed by: EMERGENCY MEDICINE

## 2020-07-11 PROCEDURE — 83690 ASSAY OF LIPASE: CPT

## 2020-07-11 PROCEDURE — 80053 COMPREHEN METABOLIC PANEL: CPT

## 2020-07-11 RX ORDER — ONDANSETRON 2 MG/ML
4 INJECTION INTRAMUSCULAR; INTRAVENOUS EVERY 6 HOURS PRN
Status: DISCONTINUED | OUTPATIENT
Start: 2020-07-11 | End: 2020-07-13 | Stop reason: HOSPADM

## 2020-07-11 RX ORDER — SODIUM CHLORIDE 0.9 % (FLUSH) 0.9 %
10 SYRINGE (ML) INJECTION PRN
Status: DISCONTINUED | OUTPATIENT
Start: 2020-07-11 | End: 2020-07-13 | Stop reason: HOSPADM

## 2020-07-11 RX ORDER — PROMETHAZINE HYDROCHLORIDE 25 MG/ML
INJECTION, SOLUTION INTRAMUSCULAR; INTRAVENOUS
Status: DISPENSED
Start: 2020-07-11 | End: 2020-07-11

## 2020-07-11 RX ORDER — CLONAZEPAM 0.5 MG/1
0.5 TABLET ORAL 2 TIMES DAILY PRN
COMMUNITY
Start: 2020-06-29 | End: 2020-10-27

## 2020-07-11 RX ORDER — POLYETHYLENE GLYCOL 3350 17 G/17G
17 POWDER, FOR SOLUTION ORAL DAILY PRN
Status: DISCONTINUED | OUTPATIENT
Start: 2020-07-11 | End: 2020-07-13 | Stop reason: HOSPADM

## 2020-07-11 RX ORDER — ACETAMINOPHEN 650 MG/1
650 SUPPOSITORY RECTAL EVERY 6 HOURS PRN
Status: DISCONTINUED | OUTPATIENT
Start: 2020-07-11 | End: 2020-07-13 | Stop reason: HOSPADM

## 2020-07-11 RX ORDER — METOPROLOL TARTRATE 5 MG/5ML
5 INJECTION INTRAVENOUS EVERY 6 HOURS PRN
Status: DISCONTINUED | OUTPATIENT
Start: 2020-07-11 | End: 2020-07-13 | Stop reason: HOSPADM

## 2020-07-11 RX ORDER — SODIUM CHLORIDE 9 MG/ML
INJECTION, SOLUTION INTRAVENOUS CONTINUOUS
Status: DISCONTINUED | OUTPATIENT
Start: 2020-07-11 | End: 2020-07-13 | Stop reason: HOSPADM

## 2020-07-11 RX ORDER — SODIUM CHLORIDE 0.9 % (FLUSH) 0.9 %
10 SYRINGE (ML) INJECTION EVERY 12 HOURS SCHEDULED
Status: DISCONTINUED | OUTPATIENT
Start: 2020-07-11 | End: 2020-07-13 | Stop reason: HOSPADM

## 2020-07-11 RX ORDER — ACETAMINOPHEN 325 MG/1
650 TABLET ORAL EVERY 6 HOURS PRN
Status: DISCONTINUED | OUTPATIENT
Start: 2020-07-11 | End: 2020-07-11

## 2020-07-11 RX ORDER — PROMETHAZINE HYDROCHLORIDE 12.5 MG/1
12.5 TABLET ORAL EVERY 6 HOURS PRN
Status: DISCONTINUED | OUTPATIENT
Start: 2020-07-11 | End: 2020-07-13 | Stop reason: HOSPADM

## 2020-07-11 RX ORDER — ONDANSETRON 2 MG/ML
INJECTION INTRAMUSCULAR; INTRAVENOUS
Status: DISPENSED
Start: 2020-07-11 | End: 2020-07-11

## 2020-07-11 RX ORDER — SODIUM CHLORIDE 9 MG/ML
INJECTION, SOLUTION INTRAVENOUS
Status: DISPENSED
Start: 2020-07-11 | End: 2020-07-11

## 2020-07-11 RX ORDER — PROPRANOLOL HYDROCHLORIDE 10 MG/1
10 TABLET ORAL 3 TIMES DAILY PRN
Status: DISCONTINUED | OUTPATIENT
Start: 2020-07-11 | End: 2020-07-11

## 2020-07-11 RX ORDER — ACETAMINOPHEN 500 MG
500 TABLET ORAL 3 TIMES DAILY PRN
Status: DISCONTINUED | OUTPATIENT
Start: 2020-07-11 | End: 2020-07-13 | Stop reason: HOSPADM

## 2020-07-11 RX ADMIN — Medication 10 ML: at 22:31

## 2020-07-11 RX ADMIN — SODIUM CHLORIDE: 9 INJECTION, SOLUTION INTRAVENOUS at 17:54

## 2020-07-11 RX ADMIN — IOPAMIDOL 100 ML: 612 INJECTION, SOLUTION INTRAVENOUS at 03:13

## 2020-07-11 RX ADMIN — ENOXAPARIN SODIUM 40 MG: 40 INJECTION SUBCUTANEOUS at 08:41

## 2020-07-11 RX ADMIN — SODIUM CHLORIDE: 9 INJECTION, SOLUTION INTRAVENOUS at 08:38

## 2020-07-11 RX ADMIN — Medication 10 ML: at 08:39

## 2020-07-11 ASSESSMENT — PAIN SCALES - GENERAL
PAINLEVEL_OUTOF10: 0
PAINLEVEL_OUTOF10: 0

## 2020-07-11 NOTE — FLOWSHEET NOTE
Patient awake and resting in bed with HOB elevated. NG-tube is maintained to low intermitten suction, with brownish-green bile like drainage noted in tube. Flushed with 30ml. of water. Lungs clear lungs. Has some edema to lower legs. Alert and oriented x4. Dr May Medina placed on consult. Some active bowel sounds noted.

## 2020-07-11 NOTE — H&P
[place] [id number] MEDICINE    HISTORY AND PHYSICAL EXAM    PATIENT NAME:  Angela Kilgore    MRN:  [id number]  SERVICE DATE:  7/11/2020   SERVICE TIME:  5:15 AM    Primary Care Physician: Laz Vuong MD         SUBJECTIVE  CHIEF COMPLAINT:  Abdominal pain  N/V    HPI:  This is a 80 y.o. female  w PMH  Afib, HTN  SBO, DVT PE, IVC filter. who presents with abdominal pain, N/V. Recent discharge afternon surgical  resolution of SBO, she felt symptoms were similar. Pain is in lower abdomen  Started  3 days ago  Accompanied by N/V. D/t severe pain, came to ED for further eval and treatment. She denies fever, chills, chest pain and breathing issues.       PAST MEDICAL HISTORY:    Past Medical History:   Diagnosis Date    Arthritis     Disease of blood and blood forming organ     DVT (deep vein thrombosis) in pregnancy     History of blood transfusion     Hx of blood clots     Pulmonary    Hyperlipidemia 1/26/2011    Hypertension     PONV (postoperative nausea and vomiting)     vomiting    Presence of IVC filter     Pulmonary embolism (HCC)     SBO (small bowel obstruction) (HCC)     Wrist fracture, right      PAST SURGICAL HISTORY:    Past Surgical History:   Procedure Laterality Date    APPENDECTOMY      CATARACT REMOVAL Bilateral     CHOLECYSTECTOMY      EYE SURGERY      [employer] bilateral implants    HEMORRHOID SURGERY      HYSTERECTOMY      KNEE SURGERY Right     [place] TOTAL KNEE ARTHROPLASTY Left 1/9/2018    LT KNEE TOTAL ARTHROPLASTY SUPINE, TANESHA PSI, AM ADMIT, SPINAL performed by Tara Gaines MD at [facility] Right      FAMILY HISTORY:    Family History   Problem Relation Age of Onset    Cancer Mother     Cancer Father     Cancer Sister         colon    Cancer Brother         colon    Arthritis Daughter     Arthritis Son      SOCIAL HISTORY:    Social History     Socioeconomic History    Marital status:      Spouse name: Not on file    Number of children: Not on file    Years of education: Not on file    Highest education level: Not on file   Occupational History    Not on file   Social Needs    Financial resource strain: Not on file    Food insecurity     Worry: Not on file     Inability: Not on file    Transportation needs     Medical: Not on file     Non-medical: Not on file   Tobacco Use    Smoking status: Never Smoker    Smokeless tobacco: Never Used   Substance and Sexual Activity    Alcohol use: No    Drug use: No    Sexual activity: Not on file   Lifestyle    Physical activity     Days per week: Not on file     Minutes per session: Not on file    Stress: Not on file   Relationships    Social connections     Talks on phone: Not on file     Gets together: Not on file     Attends Worship service: Not on file     Active member of club or organization: Not on file     Attends meetings of clubs or organizations: Not on file     Relationship status: Not on file    Intimate partner violence     Fear of current or ex partner: Not on file     Emotionally abused: Not on file     Physically abused: Not on file     Forced sexual activity: Not on file   Other Topics Concern    Not on file   Social History Narrative    Not on file     MEDICATIONS:   Prior to Admission medications    Medication Sig Start Date End Date Taking? Authorizing Provider   amLODIPine (NORVASC) 2.5 MG tablet Take 1 tablet by mouth daily 10/25/19   [employer], DO   clonazePAM (KLONOPIN) 0.5 MG tablet Take 1 tablet by mouth 2 times daily as needed for Anxiety for up to 2 days.  1/12/18 1/14/18  Lynne Corley MD   acetaminophen (TYLENOL) 650 MG extended release tablet Take 500 mg by mouth every 6-8 hours as needed  8/14/12   Historical Provider, MD   carvedilol (COREG) 25 MG tablet Take 25 mg by mouth 2 times daily  8/8/16   Historical Provider, MD   Cholecalciferol 2000 UNITS CAPS Take 1 capsule by mouth 2 times daily  4/3/17   Historical Provider, MD clear to auscultation bilaterally, no crackles or wheezing  CARDIOVASCULAR:  regular rate and rhythm, pulses 2 plus all extermities bilaterally, carotids without bruits bilaterally and mild LE edema  ABDOMEN:  normal bowel sounds, soft, mild distention and tenderness noted diffusely  MUSCULOSKELETAL:  there is no redness, warmth, or swelling of the joints  NEUROLOGIC:  A&O x3  SKIN:  no bruising or bleeding    DATA:     Diagnostic tests reviewed for today's visit:    Most recent labs and imaging results reviewed.      LABS:    Recent Results (from the past 24 hour(s))   CBC Auto Differential    Collection Time: 07/11/20 12:35 AM   Result Value Ref Range    WBC 11.9 (H) 4.8 - 10.8 K/uL    RBC 5.00 4.20 - 5.40 M/uL    Hemoglobin 14.7 12.0 - 16.0 g/dL    Hematocrit 45.3 37.0 - 47.0 %    MCV 90.5 82.0 - 100.0 fL    MCH 29.4 27.0 - 31.3 pg    MCHC 32.5 (L) 33.0 - 37.0 %    RDW 15.7 (H) 11.5 - 14.5 %    Platelets 004 803 - 810 K/uL    Neutrophils % 89.2 %    Lymphocytes % 5.5 %    Monocytes % 4.6 %    Eosinophils % 0.4 %    Basophils % 0.3 %    Neutrophils Absolute 10.6 (H) 1.4 - 6.5 K/uL    Lymphocytes Absolute 0.6 (L) 1.0 - 4.8 K/uL    Monocytes Absolute 0.6 0.2 - 0.8 K/uL    Eosinophils Absolute 0.0 0.0 - 0.7 K/uL    Basophils Absolute 0.0 0.0 - 0.2 K/uL   Comprehensive Metabolic Panel    Collection Time: 07/11/20 12:35 AM   Result Value Ref Range    Sodium 134 (L) 135 - 144 mEq/L    Potassium 4.2 3.4 - 4.9 mEq/L    Chloride 99 95 - 107 mEq/L    CO2 26 20 - 31 mEq/L    Anion Gap 9 9 - 15 mEq/L    Glucose 183 (H) 70 - 99 mg/dL    BUN 11 8 - 23 mg/dL    CREATININE 0.76 0.50 - 0.90 mg/dL    GFR Non-African American >60.0 >60    GFR  >60.0 >60    Calcium 9.6 8.5 - 9.9 mg/dL    Total Protein 7.1 6.3 - 8.0 g/dL    Alb 3.7 3.5 - 4.6 g/dL    Total Bilirubin 1.0 (H) 0.2 - 0.7 mg/dL    Alkaline Phosphatase 116 40 - 130 U/L    ALT 10 0 - 33 U/L    AST 19 0 - 35 U/L    Globulin 3.4 2.3 - 3.5 g/dL   Amylase Collection Time: 07/11/20 12:35 AM   Result Value Ref Range    Amylase 63 22 - 93 U/L   Lipase    Collection Time: 07/11/20 12:35 AM   Result Value Ref Range    Lipase 31 12 - 95 U/L   Lactic Acid, Plasma    Collection Time: 07/11/20 12:35 AM   Result Value Ref Range    Lactic Acid 1.1 0.5 - 2.2 mmol/L   Urinalysis    Collection Time: 07/11/20  1:50 AM   Result Value Ref Range    Color, UA DARK YELLOW (A) Straw/Yellow    Clarity, UA Clear Clear    Glucose, Ur Negative Negative mg/dL    Bilirubin Urine Negative Negative    Ketones, Urine 15 (A) Negative mg/dL    Specific Gravity, UA 1.024 1.005 - 1.030    Blood, Urine TRACE (A) Negative    pH, UA 5.0 5.0 - 9.0    Protein, UA TRACE (A) Negative mg/dL    Urobilinogen, Urine 0.2 <2.0 E.U./dL    Nitrite, Urine Negative Negative    Leukocyte Esterase, Urine SMALL (A) Negative   Microscopic Urinalysis    Collection Time: 07/11/20  1:50 AM   Result Value Ref Range    Bacteria, UA Negative Negative /HPF    Crystals, UA 1+ Ca. Oxalate (A) None Seen /HPF    Hyaline Casts, UA 10-20 0 - 5 /HPF    WBC, UA 20-50 (A) 0 - 5 /HPF    Epithelial Cells, UA 6-10 0 - 5 /HPF       IMAGING:  No results found. VTE Prophylaxis: low molecular weight heparin -  start    ASSESSMENT AND PLAN  Active Problems:    SBO (small bowel obstruction) ([facility]) Recurrent w N/V  Abdominal pain -  More lower abdomen. Vomiting bile. Official CT report is pending. Plan: Admit  NPO  IV fluids   NG to suction   Medical management. HTN (hypertension) chronic  Multiple agents for control  No HA, blurred vision, CP, or dizziness  Mild LE edema  Normal heart sounds  Good pulses   Plan: NPO - will use propranolol prn for SBP >170   Consider clonidine patch. Resume oral meds when able. PAF (paroxysmal atrial fibrillation) ([facility]) EKG w NSR. Rate controlled   [employer]  On warfarin  INR. 2.6  Plan: stable  Hold warfarin for now. Resume if no surgical intervention planned,.          Plan of care

## 2020-07-11 NOTE — PROGRESS NOTES
Radiology:  CT ABDOMEN PELVIS W IV CONTRAST Additional Contrast? None    (Results Pending)   XR Chest Abdomen Ng Placement    (Results Pending)           Assessment/Plan:    80 y.o. female  with PMH  of Afib, HTN  recurrent SBO, DVT / PE s/p IVC filter who presented with abdominal pain, N/V.    SBO  - keep NPO  - NG  - IV hydration  - pain control  - surgical service consulted    Leukocytosis   - mild, follow trend    Hyponatremia   - on IVFs  - monitor    PAF, DVT/PE s/p IVC filter  - resume warfarin when able to tolerate PO  - monitor INR      HTN  - IV lopressor as needed  - resume home meds when able to tolerate PO                   Electronically signed by Jose A Mabry MD on 7/11/2020 at 10:42 AM

## 2020-07-11 NOTE — CARE COORDINATION
[facility] AT [place] Case Management Initial Discharge Assessment    Met with Patient to discuss discharge plan. PCP: Meagan Richardson MD                                Date of Last Visit: \"A COUPLE DAYS AGO\"    If no PCP, list provided? N/A    Discharge Planning    Living Arrangements: independently at home    Who do you live with? SPOUSE    Who helps you with your care:  family    If lives at home:     Do you have any barriers navigating in your home? no    Patient can perform ADL? Yes    Current Services (outpatient and in home) :  None    Dialysis: No    Is transportation available to get to your appointments? Yes    DME Equipment:  yes - [facility] [facility]    Respiratory equipment: None    Respiratory provider:  no     Pharmacy:  yes - [facility] [address] with Medication Assistance Program?  No      Patient agreeable to [place] [id number]? Declined    Patient agreeable to SNF/Rehab? Declined    Other discharge needs identified? N/A    Freedom of choice list provided with basic dialogue that supports the patient's individualized plan of care/goals and shares the quality data associated with the providers. Yes    Does Patient Have a High-Risk for Readmission Diagnosis (CHF, PN, MI, COPD)? No      The plan for Transition of Care is related to the following treatment goals:NPO, NG, IVF, SURGICAL ASSEMENT    Initial Discharge Plan? (Note: please see concurrent daily documentation for any updates after initial note).     HOME     The Patient and/or patient representative: Lakhwinder Edward was provided with choice of any post-acute providers for care and equipment and agrees with discharge plan  Yes    Electronically signed by Keila Doll RN on 7/11/2020 at 10:39 AM

## 2020-07-11 NOTE — ED TRIAGE NOTES
Patient came to the ED for lower ABD pain, nausea. Patient states she vomited once. Patient states she felt the same way when she had a SBO 3 weeks ago. Respirations even and unlabored.

## 2020-07-11 NOTE — PROGRESS NOTES
#16 Fr. Nasogastric tube inserted right nares to 54 cm bridgre. Connected to LIS with immediate return of 200cc dark green fluid. Securement device to nose and tube secured to gown with safety pin. Patient tolerated very well. Denies any pain. Scant amount bleeding from nares post insertion.

## 2020-07-11 NOTE — FLOWSHEET NOTE
Patient was sitting up in the chair for a long period and ambulated up to the bathroom x2. Dr. Ngerete People visited and discontinued the NGT. Tolerating clear liquid diet.

## 2020-07-11 NOTE — CONSULTS
Department of General Surgery - Adult  Surgical Service general surgery  Attending Consult Note      Reason for Consult: Small bowel obstruction      CHIEF COMPLAINT: Nausea vomiting    History Obtained From:  patient, electronic medical record    HISTORY OF PRESENT ILLNESS:                The patient is a 80 y.o. female who presents with recurrent nausea and vomiting. She was in the hospital 3 weeks ago with a partial small bowel obstruction which resolved nonoperatively. She returns back to the emergency department with nausea vomiting requiring nasogastric tube decompression. A CAT scan again showed a transition point from previous operation near the right middle abdomen. Blood work was reviewed. Previous hospitalization and CAT scan was reviewed as well. Patient surgical history is significant for appendectomy cholecystectomy and hysterectomy. Patient denies any current abdominal pain. Nasogastric tube in place. She is on Coumadin for previous pulmonary emboli, and atrial fibrillation.       Past Medical History:        Diagnosis Date    Arthritis     Disease of blood and blood forming organ     DVT (deep vein thrombosis) in pregnancy     History of blood transfusion     Hx of blood clots     Pulmonary    Hyperlipidemia 1/26/2011    Hypertension     PONV (postoperative nausea and vomiting)     vomiting    Presence of IVC filter     Pulmonary embolism (HCC)     SBO (small bowel obstruction) (HCC)     Wrist fracture, right      Past Surgical History:        Procedure Laterality Date    APPENDECTOMY      CATARACT REMOVAL Bilateral     CHOLECYSTECTOMY      EYE SURGERY      [employer] bilateral implants    HEMORRHOID SURGERY      HYSTERECTOMY      KNEE SURGERY Right     [place] TOTAL KNEE ARTHROPLASTY Left 1/9/2018    LT KNEE TOTAL ARTHROPLASTY SUPINE, TANESHA PSI, AM ADMIT, SPINAL performed by Lilo Branch MD at [facility] Right      Current Medications: distress, and appears stated age  EYES:  Lids and lashes normal, pupils equal, round and reactive to light, extra ocular muscles intact, sclera clear, conjunctiva normal  ENT:  Normocephalic, without obvious abnormality, atraumatic, sinuses nontender on palpation, external ears without lesions, oral pharynx with moist mucus membranes, tonsils without erythema or exudates, gums normal and good dentition. NECK:  Supple, symmetrical, trachea midline, no adenopathy, thyroid symmetric, not enlarged and no tenderness, skin normal  BACK:  Symmetric, no curvature, spinous processes are non-tender on palpation, paraspinous muscles are non-tender on palpation, no costal vertebral tenderness  LUNGS:  No increased work of breathing, good air exchange, clear to auscultation bilaterally, no crackles or wheezing  CARDIOVASCULAR: Normal rate   ABDOMEN: Abdomen soft and nondistended, no pain in all 4 quadrants. MUSCULOSKELETAL:  There is no redness, warmth, or swelling of the joints. Full range of motion noted. Motor strength is 5 out of 5 all extremities bilaterally.   Tone is normal.  NEUROLOGIC: Awake alert and oriented  SKIN:  no bruising or bleeding  DATA:    CBC:   Lab Results   Component Value Date    WBC 11.9 07/11/2020    RBC 5.00 07/11/2020    HGB 14.7 07/11/2020    HCT 45.3 07/11/2020    MCV 90.5 07/11/2020    MCH 29.4 07/11/2020    MCHC 32.5 07/11/2020    RDW 15.7 07/11/2020     07/11/2020    MPV 9.2 05/17/2015     BMP:    Lab Results   Component Value Date     07/11/2020    K 4.2 07/11/2020    K 3.7 06/10/2020    CL 99 07/11/2020    CO2 26 07/11/2020    BUN 11 07/11/2020    LABALBU 3.7 07/11/2020    CREATININE 0.76 07/11/2020    CALCIUM 9.6 07/11/2020    GFRAA >60.0 07/11/2020    LABGLOM >60.0 07/11/2020    GLUCOSE 183 07/11/2020     Radiology Review: CT scan as described above    IMPRESSION/RECOMMENDATIONS:      I discussed the risks and benefits of laparoscopic lysis of adhesions for recurrent small

## 2020-07-11 NOTE — ED NOTES
Patient assisted with ambulating to bathroom. Patient ambulated with cane and steady gait.       Richa Elam RN  07/11/20 2011

## 2020-07-12 LAB
ALBUMIN SERPL-MCNC: 2.7 G/DL (ref 3.5–4.6)
ALP BLD-CCNC: 77 U/L (ref 40–130)
ALT SERPL-CCNC: 9 U/L (ref 0–33)
ANION GAP SERPL CALCULATED.3IONS-SCNC: 7 MEQ/L (ref 9–15)
AST SERPL-CCNC: 19 U/L (ref 0–35)
BASOPHILS ABSOLUTE: 0 K/UL (ref 0–0.2)
BASOPHILS RELATIVE PERCENT: 0.9 %
BILIRUB SERPL-MCNC: 0.6 MG/DL (ref 0.2–0.7)
BUN BLDV-MCNC: 12 MG/DL (ref 8–23)
CALCIUM SERPL-MCNC: 8.5 MG/DL (ref 8.5–9.9)
CHLORIDE BLD-SCNC: 108 MEQ/L (ref 95–107)
CO2: 24 MEQ/L (ref 20–31)
CREAT SERPL-MCNC: 0.69 MG/DL (ref 0.5–0.9)
EOSINOPHILS ABSOLUTE: 0.3 K/UL (ref 0–0.7)
EOSINOPHILS RELATIVE PERCENT: 7.6 %
GFR AFRICAN AMERICAN: >60
GFR NON-AFRICAN AMERICAN: >60
GLOBULIN: 2.5 G/DL (ref 2.3–3.5)
GLUCOSE BLD-MCNC: 86 MG/DL (ref 70–99)
HCT VFR BLD CALC: 36.4 % (ref 37–47)
HEMOGLOBIN: 11.6 G/DL (ref 12–16)
INR BLD: 3.8
LYMPHOCYTES ABSOLUTE: 1 K/UL (ref 1–4.8)
LYMPHOCYTES RELATIVE PERCENT: 22.8 %
MAGNESIUM: 2 MG/DL (ref 1.7–2.4)
MCH RBC QN AUTO: 29.4 PG (ref 27–31.3)
MCHC RBC AUTO-ENTMCNC: 31.9 % (ref 33–37)
MCV RBC AUTO: 92.1 FL (ref 82–100)
MONOCYTES ABSOLUTE: 0.6 K/UL (ref 0.2–0.8)
MONOCYTES RELATIVE PERCENT: 12.9 %
NEUTROPHILS ABSOLUTE: 2.6 K/UL (ref 1.4–6.5)
NEUTROPHILS RELATIVE PERCENT: 55.8 %
PDW BLD-RTO: 16.1 % (ref 11.5–14.5)
PLATELET # BLD: 200 K/UL (ref 130–400)
POTASSIUM REFLEX MAGNESIUM: 3.5 MEQ/L (ref 3.4–4.9)
PROTHROMBIN TIME: 36.7 SEC (ref 12.3–14.9)
RBC # BLD: 3.95 M/UL (ref 4.2–5.4)
SODIUM BLD-SCNC: 139 MEQ/L (ref 135–144)
TOTAL PROTEIN: 5.2 G/DL (ref 6.3–8)
URINE CULTURE, ROUTINE: NORMAL
WBC # BLD: 4.6 K/UL (ref 4.8–10.8)

## 2020-07-12 PROCEDURE — 96372 THER/PROPH/DIAG INJ SC/IM: CPT

## 2020-07-12 PROCEDURE — 1210000000 HC MED SURG R&B

## 2020-07-12 PROCEDURE — 85025 COMPLETE CBC W/AUTO DIFF WBC: CPT

## 2020-07-12 PROCEDURE — 6360000002 HC RX W HCPCS: Performed by: NURSE PRACTITIONER

## 2020-07-12 PROCEDURE — G0378 HOSPITAL OBSERVATION PER HR: HCPCS

## 2020-07-12 PROCEDURE — 36415 COLL VENOUS BLD VENIPUNCTURE: CPT

## 2020-07-12 PROCEDURE — 80053 COMPREHEN METABOLIC PANEL: CPT

## 2020-07-12 PROCEDURE — 85610 PROTHROMBIN TIME: CPT

## 2020-07-12 PROCEDURE — 83735 ASSAY OF MAGNESIUM: CPT

## 2020-07-12 PROCEDURE — 2580000003 HC RX 258: Performed by: NURSE PRACTITIONER

## 2020-07-12 RX ADMIN — ENOXAPARIN SODIUM 40 MG: 40 INJECTION SUBCUTANEOUS at 09:03

## 2020-07-12 RX ADMIN — SODIUM CHLORIDE: 9 INJECTION, SOLUTION INTRAVENOUS at 02:07

## 2020-07-12 RX ADMIN — SODIUM CHLORIDE: 9 INJECTION, SOLUTION INTRAVENOUS at 22:24

## 2020-07-12 RX ADMIN — Medication 10 ML: at 09:01

## 2020-07-12 RX ADMIN — SODIUM CHLORIDE: 9 INJECTION, SOLUTION INTRAVENOUS at 12:05

## 2020-07-12 ASSESSMENT — PAIN SCALES - GENERAL: PAINLEVEL_OUTOF10: 0

## 2020-07-12 NOTE — FLOWSHEET NOTE
Patient alert and oriented x4. Assisted up to chair this morning. Remains on clear liquids but is not liking much of it but is tolerating it fairly well. No emesis or nausea today. Lungs clear. Remains to have some lower extremity edema. Patient waiting for what GI doctor will be doing today. No complaints offered.

## 2020-07-12 NOTE — PROGRESS NOTES
Hospitalist Progress Note      PCP: Santiago Washington MD    Date of Admission: 7/10/2020    Chief Complaint:  No acute events overnight, afebrile, stable HD, NG removed yesterday, tolerating clear liquids    Medications:  Reviewed    Infusion Medications    sodium chloride 125 mL/hr at 07/12/20 0207     Scheduled Medications    sodium chloride flush  10 mL Intravenous 2 times per day    enoxaparin  40 mg Subcutaneous Daily     PRN Meds: sodium chloride flush, [DISCONTINUED] acetaminophen **OR** acetaminophen, polyethylene glycol, promethazine **OR** ondansetron, acetaminophen, metoprolol      Intake/Output Summary (Last 24 hours) at 7/12/2020 1204  Last data filed at 7/12/2020 0945  Gross per 24 hour   Intake 3167 ml   Output 950 ml   Net 2217 ml       Exam:    BP (!) 143/68   Pulse 78   Temp 98.1 °F (36.7 °C) (Oral)   Resp 18   Ht 5' 3\" (1.6 m)   Wt 200 lb (90.7 kg)   SpO2 93%   BMI 35.43 kg/m²     General appearance: sleepy, arousable, NG present. Respiratory:  Clear to auscultation, bilaterally without Rales/Wheezes  Cardiovascular: Regular rate and rhythm with normal S1/S2   Abdomen: Soft, tender,  activebowel sounds. Musculoskeletal: No clubbing, cyanosis or edema bilaterally. Labs:   Recent Labs     07/11/20 0035 07/12/20  0628   WBC 11.9* 4.6*   HGB 14.7 11.6*   HCT 45.3 36.4*    200     Recent Labs     07/11/20  0035 07/11/20  0052 07/12/20  0628   *  --  139   K 4.2  --  3.5   CL 99  --  108*   CO2 26  --  24   BUN 11  --  12   CREATININE 0.76 0.8 0.69   CALCIUM 9.6  --  8.5     Recent Labs     07/11/20  0035 07/12/20  0628   AST 19 19   ALT 10 9   BILITOT 1.0* 0.6   ALKPHOS 116 77     Recent Labs     07/12/20  0628   INR 3.8     No results for input(s): Laverda Dancer in the last 72 hours.     Urinalysis:      Lab Results   Component Value Date    NITRU Negative 07/11/2020    WBCUA 20-50 07/11/2020    BACTERIA Negative 07/11/2020    RBCUA 0-2 06/08/2020    BLOODU TRACE 07/11/2020    SPECGRAV 1.024 07/11/2020    GLUCOSEU Negative 07/11/2020       Radiology:  XR Chest Abdomen Ng Placement   Final Result      Satisfactory placement of a nasogastric tube. CT ABDOMEN PELVIS W IV CONTRAST Additional Contrast? None   Final Result      Multiple borderline mildly distended fluid-filled loops of small bowel are identified throughout the abdomen and appear similar to June 8, 2020 CT of the abdomen and pelvis. These findings may relate to enteritis or ileus with small bowel obstruction not    excluded. Small amount of ascites. 2 mm nonobstructing right renal calculus. Small hiatal hernia. 2 small metallic density structures appear within the lumen of the splenic flexure of the colon. All CT scans at this facility use dose modulation, iterative reconstruction, and/or weight based dosing when appropriate to reduce radiation dose to as low as reasonably achievable. Assessment/Plan:    80 y.o. female  with PMH  of Afib, HTN  recurrent SBO, DVT / PE s/p IVC filter who presented with abdominal pain, N/V.     Recurrent SBO  - refused surgical intervention  - NG removed  - tolerating clear liquids  - IV hydration  - pain control  - surgical service following    Leukocytosis   - resolved, now has leukopenia, follow trend    Hyponatremia   - resolved with IVFs  - monitor    PAF, DVT/PE s/p IVC filter  - hold warfarin due to supratherapeutic INR  - monitor INR      HTN  - IV lopressor as needed  - resume home meds when able to tolerate PO          Electronically signed by Julianna Huitron MD on 7/12/2020 at 12:04 PM

## 2020-07-12 NOTE — FLOWSHEET NOTE
2035 Shift assessment complete. Pt is resting in bed. Her IV occluded and had to be removed. I had to failed attempts at putting in an Lorrine Horseman RN had two failed attempts and one success. We got a 22g in her right wrist. No meds to be given. Pt denies pain. VS = stable. AxOx4. Call light within reach. Will continue to monitor.

## 2020-07-13 VITALS
DIASTOLIC BLOOD PRESSURE: 71 MMHG | WEIGHT: 200 LBS | BODY MASS INDEX: 35.44 KG/M2 | TEMPERATURE: 97.5 F | HEART RATE: 88 BPM | OXYGEN SATURATION: 95 % | SYSTOLIC BLOOD PRESSURE: 162 MMHG | HEIGHT: 63 IN | RESPIRATION RATE: 18 BRPM

## 2020-07-13 LAB
ALBUMIN SERPL-MCNC: 3.3 G/DL (ref 3.5–4.6)
ALP BLD-CCNC: 93 U/L (ref 40–130)
ALT SERPL-CCNC: 11 U/L (ref 0–33)
ANION GAP SERPL CALCULATED.3IONS-SCNC: 13 MEQ/L (ref 9–15)
AST SERPL-CCNC: 22 U/L (ref 0–35)
BASOPHILS ABSOLUTE: 0 K/UL (ref 0–0.2)
BASOPHILS RELATIVE PERCENT: 0.8 %
BILIRUB SERPL-MCNC: 0.8 MG/DL (ref 0.2–0.7)
BUN BLDV-MCNC: 7 MG/DL (ref 8–23)
CALCIUM SERPL-MCNC: 8.5 MG/DL (ref 8.5–9.9)
CHLORIDE BLD-SCNC: 107 MEQ/L (ref 95–107)
CO2: 21 MEQ/L (ref 20–31)
CREAT SERPL-MCNC: 0.55 MG/DL (ref 0.5–0.9)
EOSINOPHILS ABSOLUTE: 0.2 K/UL (ref 0–0.7)
EOSINOPHILS RELATIVE PERCENT: 3.3 %
GFR AFRICAN AMERICAN: >60
GFR NON-AFRICAN AMERICAN: >60
GLOBULIN: 3.1 G/DL (ref 2.3–3.5)
GLUCOSE BLD-MCNC: 84 MG/DL (ref 70–99)
HCT VFR BLD CALC: 42.4 % (ref 37–47)
HEMOGLOBIN: 13.9 G/DL (ref 12–16)
LYMPHOCYTES ABSOLUTE: 1.2 K/UL (ref 1–4.8)
LYMPHOCYTES RELATIVE PERCENT: 22.9 %
MAGNESIUM: 2 MG/DL (ref 1.7–2.4)
MCH RBC QN AUTO: 29.7 PG (ref 27–31.3)
MCHC RBC AUTO-ENTMCNC: 32.9 % (ref 33–37)
MCV RBC AUTO: 90.4 FL (ref 82–100)
MONOCYTES ABSOLUTE: 0.4 K/UL (ref 0.2–0.8)
MONOCYTES RELATIVE PERCENT: 8 %
NEUTROPHILS ABSOLUTE: 3.5 K/UL (ref 1.4–6.5)
NEUTROPHILS RELATIVE PERCENT: 65 %
PDW BLD-RTO: 15.6 % (ref 11.5–14.5)
PLATELET # BLD: 245 K/UL (ref 130–400)
POTASSIUM REFLEX MAGNESIUM: 3.4 MEQ/L (ref 3.4–4.9)
RBC # BLD: 4.69 M/UL (ref 4.2–5.4)
SODIUM BLD-SCNC: 141 MEQ/L (ref 135–144)
TOTAL PROTEIN: 6.4 G/DL (ref 6.3–8)
WBC # BLD: 5.3 K/UL (ref 4.8–10.8)

## 2020-07-13 PROCEDURE — 6360000002 HC RX W HCPCS: Performed by: INTERNAL MEDICINE

## 2020-07-13 PROCEDURE — 36415 COLL VENOUS BLD VENIPUNCTURE: CPT

## 2020-07-13 PROCEDURE — 2580000003 HC RX 258: Performed by: NURSE PRACTITIONER

## 2020-07-13 PROCEDURE — 96374 THER/PROPH/DIAG INJ IV PUSH: CPT

## 2020-07-13 PROCEDURE — 2500000003 HC RX 250 WO HCPCS: Performed by: INTERNAL MEDICINE

## 2020-07-13 PROCEDURE — 83735 ASSAY OF MAGNESIUM: CPT

## 2020-07-13 PROCEDURE — 96375 TX/PRO/DX INJ NEW DRUG ADDON: CPT

## 2020-07-13 PROCEDURE — 80053 COMPREHEN METABOLIC PANEL: CPT

## 2020-07-13 PROCEDURE — 6370000000 HC RX 637 (ALT 250 FOR IP): Performed by: INTERNAL MEDICINE

## 2020-07-13 PROCEDURE — 85025 COMPLETE CBC W/AUTO DIFF WBC: CPT

## 2020-07-13 PROCEDURE — G0378 HOSPITAL OBSERVATION PER HR: HCPCS

## 2020-07-13 RX ORDER — AMLODIPINE BESYLATE 5 MG/1
2.5 TABLET ORAL DAILY
Status: DISCONTINUED | OUTPATIENT
Start: 2020-07-13 | End: 2020-07-13 | Stop reason: HOSPADM

## 2020-07-13 RX ORDER — MIRTAZAPINE 15 MG/1
15 TABLET, FILM COATED ORAL NIGHTLY
Status: DISCONTINUED | OUTPATIENT
Start: 2020-07-13 | End: 2020-07-13 | Stop reason: HOSPADM

## 2020-07-13 RX ORDER — CARVEDILOL 12.5 MG/1
25 TABLET ORAL 2 TIMES DAILY
Status: DISCONTINUED | OUTPATIENT
Start: 2020-07-13 | End: 2020-07-13 | Stop reason: HOSPADM

## 2020-07-13 RX ORDER — WARFARIN SODIUM 7.5 MG/1
3.75 TABLET ORAL
Qty: 30 TABLET | Refills: 3 | Status: SHIPPED | OUTPATIENT
Start: 2020-07-13

## 2020-07-13 RX ORDER — CLONAZEPAM 0.5 MG/1
0.5 TABLET ORAL 2 TIMES DAILY PRN
Status: DISCONTINUED | OUTPATIENT
Start: 2020-07-13 | End: 2020-07-13 | Stop reason: HOSPADM

## 2020-07-13 RX ORDER — HYDRALAZINE HYDROCHLORIDE 20 MG/ML
10 INJECTION INTRAMUSCULAR; INTRAVENOUS EVERY 4 HOURS PRN
Status: DISCONTINUED | OUTPATIENT
Start: 2020-07-13 | End: 2020-07-13 | Stop reason: HOSPADM

## 2020-07-13 RX ORDER — LOSARTAN POTASSIUM 50 MG/1
50 TABLET ORAL DAILY
Status: DISCONTINUED | OUTPATIENT
Start: 2020-07-13 | End: 2020-07-13 | Stop reason: HOSPADM

## 2020-07-13 RX ADMIN — CLONAZEPAM 0.5 MG: 0.5 TABLET ORAL at 11:11

## 2020-07-13 RX ADMIN — SODIUM CHLORIDE: 9 INJECTION, SOLUTION INTRAVENOUS at 06:13

## 2020-07-13 RX ADMIN — CARVEDILOL 25 MG: 12.5 TABLET, FILM COATED ORAL at 11:12

## 2020-07-13 RX ADMIN — AMLODIPINE BESYLATE 2.5 MG: 5 TABLET ORAL at 11:11

## 2020-07-13 RX ADMIN — HYDRALAZINE HYDROCHLORIDE 10 MG: 20 INJECTION INTRAMUSCULAR; INTRAVENOUS at 03:53

## 2020-07-13 RX ADMIN — METOPROLOL TARTRATE 5 MG: 5 INJECTION INTRAVENOUS at 00:38

## 2020-07-13 ASSESSMENT — PAIN SCALES - GENERAL
PAINLEVEL_OUTOF10: 0
PAINLEVEL_OUTOF10: 0

## 2020-07-13 NOTE — FLOWSHEET NOTE
2224 Shift assessment complete. Efren Corley and was given the okay to advance diet as tolerated. BP slightly elevated, will continue to monitor. Pt up and to the restroom with cane. Denies any pain. She states she is ready to go home. Pt is AxOx4. Call light within reach. Will continue to monitor. 0050 BP was elevated, gave 5mg IV lopressor. Will continue to monitor. 0335 BP was elevated to 170/81. Messaged sedar and he put in an order for hyrdalazine prn. Will continue to monitor. 4617 Pts IV infiltrated this morning. Lucio Garcia RN placed a new 20g in her left hand this AM. She had 3 formed BM through the night. Up to urinate with cane.      Electronically signed by Jcarlos Mcneal RN on 7/13/2020 at 6:24 AM

## 2020-07-13 NOTE — DISCHARGE SUMMARY
Physician Discharge Summary     Patient ID:  Brittni Peña  [id number]  71 y.o.  11/27/1929    Admit date: 7/10/2020    Discharge date : 07/13/20     Admitting Physician: Yogi Hill MD     Discharge Physician: London Angel DO     Admission Diagnoses: [facility] [K56.609]  SBO (small bowel obstruction) ([facility] [S58.119]  SBO (small bowel obstruction) ([facility] [O65.584]  SBO (small bowel obstruction) ([facility] [F98.035]    Discharge Diagnoses: SBO    Admission Condition: fair    Discharged Condition: good    Hospital Course: Pt admitted and found to have SBO. Surgery consulted. Pt improved with conservative management and was progressed to full diet and was having formed BMs on 7/13. Pt clear from surgery stand and patient discharged home in stable and improved condition. Consults: general surgery    Discharge Exam:  Constitutional: Awake and alert in no acute distress.  Sitting in chair comfortably  Head: Normocephalic, atraumatic  Eyes: EOMI, PERRLA  ENT: moist mucous membranes  Neck: neck supple, trachea midline  Lungs: Good inspiratory effort, CTABL, no wheeze, no rhonchi, no rales  Heart: RRR, normal S1 and S2, no murmurs  GI: Soft, non-distended, non tender, no guarding, no rebound, +BS  MSK: no edema noted  Pulses: 2+ pulses bilaterally  Skin: warm, dry  Psych: appropriate affect       Labs:   Recent Labs     07/11/20  0035 07/12/20  0628 07/13/20  0619   WBC 11.9* 4.6* 5.3   HGB 14.7 11.6* 13.9   HCT 45.3 36.4* 42.4    200 245     Recent Labs     07/11/20  0035 07/11/20  0052 07/12/20  0628 07/13/20  0619   *  --  139 141   K 4.2  --  3.5 3.4   CL 99  --  108* 107   CO2 26  --  24 21   BUN 11  --  12 7*   CREATININE 0.76 0.8 0.69 0.55   CALCIUM 9.6  --  8.5 8.5     Recent Labs     07/11/20  0035 07/12/20  0628 07/13/20  0619   AST 19 19 22   ALT 10 9 11   BILITOT 1.0* 0.6 0.8*   ALKPHOS 116 77 93     Recent Labs     07/12/20  0628   INR 3.8     No results for input(s): Thelda Rough in the last 72 hours. Urinalysis:   Lab Results   Component Value Date    NITRU Negative 2020    WBCUA 20-50 2020    BACTERIA Negative 2020    RBCUA 0-2 2020    BLOODU TRACE 2020    SPECGRAV 1.024 2020    GLUCOSEU Negative 2020       Radiology:   Most recent    Chest CT      WITH CONTRAST:No results found for this or any previous visit. WITHOUT CONTRAST: No results found for this or any previous visit. CXR      2-view: No results found for this or any previous visit. Portable:   Results for orders placed during the hospital encounter of 10/24/19   XR CHEST PORTABLE    Narrative Patient MRN: [id number]    : 1929    Age:  80 years    Gender: Female    Order Date: 10/24/2019 12:45 PM.     Exam: XR CHEST PORTABLE    Number of Views: 1     Indication:   lightheaded h/a having feeling of passing out     Comparison: 2015    Findings: Stable, enlarged cardiomediastinal silhouette. No infiltrate/pneumonia, pneumothorax or pleural effusion. Right-sided hemidiaphragmatic eventration. Vascular calcifications thoracic aorta. Osteopenia. Impression Impression:    1. Stable, enlarged cardiomediastinal silhouette with thoracic aortic vascular calcifications and osteopenia. 2. No acute cardiopulmonary process identified         Echo No results found for this or any previous visit. Disposition: home    In process/preliminary results:  Outstanding Order Results     No orders found from 2020 to 2020. Patient Instructions:   Current Discharge Medication List      CONTINUE these medications which have CHANGED    Details   warfarin (COUMADIN) 7.5 MG tablet Take 0.5 tablets by mouth Twice a Week  Qty: 30 tablet, Refills: 3         CONTINUE these medications which have NOT CHANGED    Details   clonazePAM (KLONOPIN) 0.5 MG tablet Take 0.5 mg by mouth 2 times daily as needed for Anxiety.       amLODIPine (NORVASC) 2.5 MG tablet

## 2020-07-13 NOTE — PROGRESS NOTES
Assessment complete this AM. Pt had 3 formed BMs overnight per nightshift RN. BPs had been elevated and prn IV medications were administered overnight. Patient feeling anxious this AM because she wants to go home and she has not had her home meds since admission. Perfect serve sent to Dr. PH[facility] notifying the need for home meds to be reordered and possible d/c depending on Dr. Kailey Keen clearance. Pt resting in bed now eating breakfast, diet general now. Electronically signed by Emily Garza RN on 7/13/2020 at 8:16 AM

## 2020-07-13 NOTE — CARE COORDINATION
PLAN IS FOR THE PATIENT TO BE DISCHARGED HOME TODAY. NO NEEDS IDENTIFIED. CM/LSW TO FOLLOW FOR ANY NEW D/C NEEDS OR CHANGES TO THE D/C PLAN OF CARE.

## 2020-08-18 NOTE — ED PROVIDER NOTES
[address] [facility]  EMERGENCY DEPARTMENT ENCOUNTER      Pt Name: Linden Lopez  MRN: [id number]  Ayeshagfthi 11/27/1929  Date of evaluation: 7/10/2020  Provider: Concha Ponce MD    [address] [place] [address]       Chief Complaint   Patient presents with    Abdominal Pain     lower quad ABD pain that began this morning     Emesis         HISTORY OF PRESENT ILLNESS   (Location/Symptom, Timing/Onset, Context/Setting, Quality, Duration, Modifying Factors, Severity)  Note limiting factors. SEE H/P FROM T SHEET          Nursing Notes were reviewed. REVIEW OF SYSTEMS    (2-9 systems for level 4, 10 or more for level 5)     Review of Systems   All other systems reviewed and are negative. Except as noted above the remainder of the review of systems was reviewed and negative.        PAST MEDICAL HISTORY     Past Medical History:   Diagnosis Date    Arthritis     Disease of blood and blood forming organ     DVT (deep vein thrombosis) in pregnancy     History of blood transfusion     Hx of blood clots     Pulmonary    Hyperlipidemia 1/26/2011    Hypertension     PONV (postoperative nausea and vomiting)     vomiting    Presence of IVC filter     Pulmonary embolism (HCC)     SBO (small bowel obstruction) (HCC)     Wrist fracture, right          SURGICAL HISTORY       Past Surgical History:   Procedure Laterality Date    APPENDECTOMY      CATARACT REMOVAL Bilateral     CHOLECYSTECTOMY      EYE SURGERY      Phlco bilateral implants    HEMORRHOID SURGERY      HYSTERECTOMY      KNEE SURGERY Right     [place] TOTAL KNEE ARTHROPLASTY Left 1/9/2018    LT KNEE TOTAL ARTHROPLASTY SUPINE, TANESHA PSI, AM ADMIT, SPINAL performed by Kristen Foster MD at [facility] Right          CURRENT MEDICATIONS       Discharge Medication List as of 7/13/2020  1:30 PM      CONTINUE these medications which have NOT CHANGED    Details   clonazePAM (KLONOPIN) 0.5 MG tablet Take 0.5 mg by mouth 2 times daily as needed for Anxiety. Historical Med      amLODIPine (NORVASC) 2.5 MG tablet Take 1 tablet by mouth daily, Disp-30 tablet, R-3Normal      acetaminophen (TYLENOL) 650 MG extended release tablet Take 500 mg by mouth every 6-8 hours as needed Historical Med      carvedilol (COREG) 25 MG tablet Take 25 mg by mouth 2 times daily Historical Med      Cholecalciferol 2000 UNITS CAPS Take 1 capsule by mouth 2 times daily Historical Med      losartan (COZAAR) 50 MG tablet Take 50 mg by mouthHistorical Med      mirtazapine (REMERON) 30 MG tablet Take 30 mg by mouth Historical Med      potassium chloride (KLOR-CON M) 10 MEQ extended release tablet Take 10 mEq by mouth 2 times daily Historical Med             ALLERGIES     Celebrex [celecoxib]; Cymbalta [duloxetine hcl];  Diltiazem; and Lisinopril    FAMILY HISTORY       Family History   Problem Relation Age of Onset    Cancer Mother     Cancer Father     Cancer Sister         colon    Cancer Brother         colon    Arthritis Daughter     Arthritis Son           SOCIAL HISTORY       Social History     Socioeconomic History    Marital status:      Spouse name: None    Number of children: None    Years of education: None    Highest education level: None   Occupational History    None   Social Needs    Financial resource strain: None    Food insecurity     Worry: None     Inability: None    Transportation needs     Medical: None     Non-medical: None   Tobacco Use    Smoking status: Never Smoker    Smokeless tobacco: Never Used   Substance and Sexual Activity    Alcohol use: No    Drug use: No    Sexual activity: None   Lifestyle    Physical activity     Days per week: None     Minutes per session: None    Stress: None   Relationships    Social connections     Talks on phone: None     Gets together: None     Attends Rastafari service: None     Active member of club or organization: None     Attends meetings of clubs or organizations: None     Relationship status: None    Intimate partner violence     Fear of current or ex partner: None     Emotionally abused: None     Physically abused: None     Forced sexual activity: None   Other Topics Concern    None   Social History Narrative    None       SCREENINGS    Starbuck Coma Scale  Eye Opening: Spontaneous  Best Verbal Response: Oriented  Best Motor Response: Obeys commands  Ella Coma Scale Score: 15          PHYSICAL EXAM    (up to 7 for level 4, 8 or more for level 5)     ED Triage Vitals   BP Temp Temp Source Pulse Resp SpO2 Height Weight   07/10/20 2207 07/10/20 2207 07/11/20 0540 07/10/20 2207 07/10/20 2207 07/10/20 2207 07/10/20 2207 07/10/20 2207   (!) 159/88 98.1 °F (36.7 °C) Oral 85 17 97 % 5' 3\" (1.6 m) 200 lb (90.7 kg)       Physical Exam    DIAGNOSTIC RESULTS     EKG: All EKG's are interpreted by the Emergency Department Physician who either signs or Co-signs this chart in the absence of a cardiologist.    RADIOLOGY:   Non-plain film images such as CT, Ultrasound and MRI are read by the radiologist. [facility] are visualized and preliminarily interpreted by the emergency physician with the below findings:    Interpretation per the Radiologist below, if available at the time of this note:    RADIOLOGY REPORT   Final Result      XR Chest Abdomen Ng Placement   Final Result      Satisfactory placement of a nasogastric tube. CT ABDOMEN PELVIS W IV CONTRAST Additional Contrast? None   Final Result      Multiple borderline mildly distended fluid-filled loops of small bowel are identified throughout the abdomen and appear similar to June 8, 2020 CT of the abdomen and pelvis. These findings may relate to enteritis or ileus with small bowel obstruction not    excluded. Small amount of ascites. 2 mm nonobstructing right renal calculus. Small hiatal hernia. 2 small metallic density structures appear within the lumen of the splenic flexure of the colon.          All CT scans at this facility use dose modulation, iterative reconstruction, and/or weight based dosing when appropriate to reduce radiation dose to as low as reasonably achievable. LABS:  Labs Reviewed   CBC WITH AUTO DIFFERENTIAL - Abnormal; Notable for the following components:       Result Value    WBC 11.9 (*)     MCHC 32.5 (*)     RDW 15.7 (*)     Neutrophils Absolute 10.6 (*)     Lymphocytes Absolute 0.6 (*)     All other components within normal limits   COMPREHENSIVE METABOLIC PANEL - Abnormal; Notable for the following components:    Sodium 134 (*)     Glucose 183 (*)     Total Bilirubin 1.0 (*)     All other components within normal limits   URINALYSIS - Abnormal; Notable for the following components:    Color, UA DARK YELLOW (*)     Ketones, Urine 15 (*)     Blood, Urine TRACE (*)     Protein, UA TRACE (*)     Leukocyte Esterase, Urine SMALL (*)     All other components within normal limits   MICROSCOPIC URINALYSIS - Abnormal; Notable for the following components:    Crystals, UA 1+ Ca.  Oxalate (*)     WBC, UA 20-50 (*)     All other components within normal limits   COMPREHENSIVE METABOLIC PANEL W/ REFLEX TO MG FOR LOW K - Abnormal; Notable for the following components:    Chloride 108 (*)     Anion Gap 7 (*)     Total Protein 5.2 (*)     Alb 2.7 (*)     All other components within normal limits   CBC WITH AUTO DIFFERENTIAL - Abnormal; Notable for the following components:    WBC 4.6 (*)     RBC 3.95 (*)     Hemoglobin 11.6 (*)     Hematocrit 36.4 (*)     MCHC 31.9 (*)     RDW 16.1 (*)     All other components within normal limits   PROTIME-INR - Abnormal; Notable for the following components:    Protime 36.7 (*)     All other components within normal limits   COMPREHENSIVE METABOLIC PANEL W/ REFLEX TO MG FOR LOW K - Abnormal; Notable for the following components:    BUN 7 (*)     Alb 3.3 (*)     Total Bilirubin 0.8 (*)     All other components within normal limits    Narrative:     Collection has been rescheduled by Jaziel Guillen at 07/13/2020 05:59 Reason:   Patient in restroom   CBC WITH AUTO DIFFERENTIAL - Abnormal; Notable for the following components:    MCHC 32.9 (*)     RDW 15.6 (*)     All other components within normal limits    Narrative:     Collection has been rescheduled by Jaziel Guillen at 07/13/2020 05:59 Reason:   Patient in restroom   CULTURE, URINE    Narrative:     ORDER#: [id number]                          ORDERED BY: SHARMAINE Ambrocio  SOURCE: Urine Clean Catch                  COLLECTED:  07/11/20 01:53  ANTIBIOTICS AT ANNEL.:                      RECEIVED :  07/11/20 01:53   AMYLASE   LIPASE   LACTIC ACID, PLASMA   MAGNESIUM   MAGNESIUM    Narrative:     Collection has been rescheduled by Jaziel Guillen at 07/13/2020 05:59 Reason:   Patient in restroom   POCT VENOUS       All other labs were within normal range or not returned as of this dictation. EMERGENCY DEPARTMENT COURSE and DIFFERENTIAL DIAGNOSIS/MDM:   Vitals:    Vitals:    07/13/20 0329 07/13/20 0516 07/13/20 0624 07/13/20 0753   BP: (!) 180/89 (!) 168/83 (!) 172/91 (!) 162/71   Pulse: 88 84 91 88   Resp:    18   Temp:    97.5 °F (36.4 °C)   TempSrc:    Oral   SpO2:    95%   Weight:       Height:           MDM    Procedures    CRITICAL CARE TIME   Total Critical Care time was 0 minutes, excluding separately reportable procedures. There was a high probability of clinically significant/life threatening deterioration in the patient's condition which required my urgent intervention. FINAL IMPRESSION      1.  Small bowel obstruction [facility])          DISPOSITION/PLAN   DISPOSITION Admitted 07/11/2020 04:56:44 AM      (Please note that portions of this note were completed with a voice recognition program.  Efforts were made to edit the dictations but occasionally words are mis-transcribed.)    Yogi Hill MD (electronically signed)  Attending Emergency Physician        Yogi Hill MD  08/18/20 9295

## 2020-09-16 ENCOUNTER — APPOINTMENT (OUTPATIENT)
Dept: CT IMAGING | Age: 85
DRG: 357 | End: 2020-09-16
Payer: MEDICARE

## 2020-09-16 ENCOUNTER — HOSPITAL ENCOUNTER (INPATIENT)
Age: 85
LOS: 2 days | Discharge: HOME | DRG: 357 | End: 2020-09-19
Attending: SURGERY | Admitting: SURGERY
Payer: MEDICARE

## 2020-09-16 LAB
ALBUMIN SERPL-MCNC: 3.7 G/DL (ref 3.5–4.6)
ALP BLD-CCNC: 127 U/L (ref 40–130)
ALT SERPL-CCNC: 9 U/L (ref 0–33)
ANION GAP SERPL CALCULATED.3IONS-SCNC: 11 MEQ/L (ref 9–15)
AST SERPL-CCNC: 20 U/L (ref 0–35)
BASOPHILS ABSOLUTE: 0.1 K/UL (ref 0–0.2)
BASOPHILS RELATIVE PERCENT: 0.6 %
BILIRUB SERPL-MCNC: 0.8 MG/DL (ref 0.2–0.7)
BUN BLDV-MCNC: 9 MG/DL (ref 8–23)
CALCIUM SERPL-MCNC: 9.8 MG/DL (ref 8.5–9.9)
CHLORIDE BLD-SCNC: 102 MEQ/L (ref 95–107)
CO2: 24 MEQ/L (ref 20–31)
CREAT SERPL-MCNC: 0.74 MG/DL (ref 0.5–0.9)
EOSINOPHILS ABSOLUTE: 0.1 K/UL (ref 0–0.7)
EOSINOPHILS RELATIVE PERCENT: 1.1 %
GFR AFRICAN AMERICAN: >60
GFR NON-AFRICAN AMERICAN: >60
GLOBULIN: 3.5 G/DL (ref 2.3–3.5)
GLUCOSE BLD-MCNC: 138 MG/DL (ref 70–99)
HCT VFR BLD CALC: 44.2 % (ref 37–47)
HEMOGLOBIN: 14.5 G/DL (ref 12–16)
LIPASE: 32 U/L (ref 12–95)
LYMPHOCYTES ABSOLUTE: 1 K/UL (ref 1–4.8)
LYMPHOCYTES RELATIVE PERCENT: 9.2 %
MCH RBC QN AUTO: 29.6 PG (ref 27–31.3)
MCHC RBC AUTO-ENTMCNC: 32.8 % (ref 33–37)
MCV RBC AUTO: 90.2 FL (ref 82–100)
MONOCYTES ABSOLUTE: 0.6 K/UL (ref 0.2–0.8)
MONOCYTES RELATIVE PERCENT: 5.9 %
NEUTROPHILS ABSOLUTE: 8.8 K/UL (ref 1.4–6.5)
NEUTROPHILS RELATIVE PERCENT: 83.2 %
PDW BLD-RTO: 15.1 % (ref 11.5–14.5)
PLATELET # BLD: 207 K/UL (ref 130–400)
POTASSIUM SERPL-SCNC: 4.2 MEQ/L (ref 3.4–4.9)
RBC # BLD: 4.9 M/UL (ref 4.2–5.4)
SODIUM BLD-SCNC: 137 MEQ/L (ref 135–144)
TOTAL PROTEIN: 7.2 G/DL (ref 6.3–8)
WBC # BLD: 10.6 K/UL (ref 4.8–10.8)

## 2020-09-16 PROCEDURE — 96376 TX/PRO/DX INJ SAME DRUG ADON: CPT

## 2020-09-16 PROCEDURE — 36415 COLL VENOUS BLD VENIPUNCTURE: CPT

## 2020-09-16 PROCEDURE — 96374 THER/PROPH/DIAG INJ IV PUSH: CPT

## 2020-09-16 PROCEDURE — 85610 PROTHROMBIN TIME: CPT

## 2020-09-16 PROCEDURE — 6360000002 HC RX W HCPCS: Performed by: PHYSICIAN ASSISTANT

## 2020-09-16 PROCEDURE — 83690 ASSAY OF LIPASE: CPT

## 2020-09-16 PROCEDURE — 99285 EMERGENCY DEPT VISIT HI MDM: CPT

## 2020-09-16 PROCEDURE — 6360000004 HC RX CONTRAST MEDICATION: Performed by: PHYSICIAN ASSISTANT

## 2020-09-16 PROCEDURE — 6360000002 HC RX W HCPCS: Performed by: EMERGENCY MEDICINE

## 2020-09-16 PROCEDURE — 85025 COMPLETE CBC W/AUTO DIFF WBC: CPT

## 2020-09-16 PROCEDURE — 74177 CT ABD & PELVIS W/CONTRAST: CPT

## 2020-09-16 PROCEDURE — 80053 COMPREHEN METABOLIC PANEL: CPT

## 2020-09-16 PROCEDURE — 81001 URINALYSIS AUTO W/SCOPE: CPT

## 2020-09-16 RX ORDER — ONDANSETRON 2 MG/ML
4 INJECTION INTRAMUSCULAR; INTRAVENOUS ONCE
Status: COMPLETED | OUTPATIENT
Start: 2020-09-16 | End: 2020-09-16

## 2020-09-16 RX ADMIN — ONDANSETRON 4 MG: 2 INJECTION INTRAMUSCULAR; INTRAVENOUS at 21:19

## 2020-09-16 RX ADMIN — IOPAMIDOL 100 ML: 612 INJECTION, SOLUTION INTRAVENOUS at 23:18

## 2020-09-16 RX ADMIN — ONDANSETRON 4 MG: 2 INJECTION INTRAMUSCULAR; INTRAVENOUS at 22:35

## 2020-09-16 ASSESSMENT — ENCOUNTER SYMPTOMS
EYE DISCHARGE: 0
ABDOMINAL PAIN: 1
PHOTOPHOBIA: 0
ABDOMINAL DISTENTION: 0
DIARRHEA: 0
ANAL BLEEDING: 0
BLOOD IN STOOL: 0
APNEA: 0
SHORTNESS OF BREATH: 0
VOICE CHANGE: 0
NAUSEA: 1
COUGH: 0
BACK PAIN: 0
VOMITING: 1

## 2020-09-17 ENCOUNTER — APPOINTMENT (OUTPATIENT)
Dept: GENERAL RADIOLOGY | Age: 85
DRG: 357 | End: 2020-09-17
Payer: MEDICARE

## 2020-09-17 ENCOUNTER — ANESTHESIA EVENT (OUTPATIENT)
Dept: OPERATING ROOM | Age: 85
DRG: 357 | End: 2020-09-17
Payer: MEDICARE

## 2020-09-17 ENCOUNTER — ANESTHESIA (OUTPATIENT)
Dept: OPERATING ROOM | Age: 85
DRG: 357 | End: 2020-09-17
Payer: MEDICARE

## 2020-09-17 VITALS
RESPIRATION RATE: 16 BRPM | TEMPERATURE: 97.9 F | SYSTOLIC BLOOD PRESSURE: 142 MMHG | OXYGEN SATURATION: 97 % | DIASTOLIC BLOOD PRESSURE: 74 MMHG

## 2020-09-17 LAB
ABO/RH: NORMAL
ANTIBODY SCREEN: NORMAL
BACTERIA: NEGATIVE /HPF
BILIRUBIN URINE: NEGATIVE
BLOOD BANK DISPENSE STATUS: NORMAL
BLOOD BANK DISPENSE STATUS: NORMAL
BLOOD BANK PRODUCT CODE: NORMAL
BLOOD BANK PRODUCT CODE: NORMAL
BLOOD, URINE: NEGATIVE
BPU ID: NORMAL
BPU ID: NORMAL
CLARITY: CLEAR
COLOR: YELLOW
DESCRIPTION BLOOD BANK: NORMAL
DESCRIPTION BLOOD BANK: NORMAL
EKG ATRIAL RATE: 89 BPM
EKG P AXIS: 8 DEGREES
EKG P-R INTERVAL: 158 MS
EKG Q-T INTERVAL: 380 MS
EKG QRS DURATION: 74 MS
EKG QTC CALCULATION (BAZETT): 462 MS
EKG R AXIS: -46 DEGREES
EKG T AXIS: -7 DEGREES
EKG VENTRICULAR RATE: 89 BPM
EPITHELIAL CELLS, UA: ABNORMAL /HPF (ref 0–5)
GFR AFRICAN AMERICAN: >60
GFR NON-AFRICAN AMERICAN: >60
GLUCOSE URINE: NEGATIVE MG/DL
HYALINE CASTS: ABNORMAL /HPF (ref 0–5)
INR BLD: 1.7
INR BLD: 2
KETONES, URINE: 15 MG/DL
LEUKOCYTE ESTERASE, URINE: ABNORMAL
NITRITE, URINE: NEGATIVE
PERFORMED ON: NORMAL
PH UA: 6 (ref 5–9)
POC CREATININE: 0.8 MG/DL (ref 0.6–1.2)
POC SAMPLE TYPE: NORMAL
PROTEIN UA: NEGATIVE MG/DL
PROTHROMBIN TIME: 19.6 SEC (ref 12.3–14.9)
PROTHROMBIN TIME: 22.8 SEC (ref 12.3–14.9)
RBC UA: ABNORMAL /HPF (ref 0–5)
SARS-COV-2, NAAT: NOT DETECTED
SPECIFIC GRAVITY UA: 1.04 (ref 1–1.03)
URINE REFLEX TO CULTURE: ABNORMAL
UROBILINOGEN, URINE: 0.2 E.U./DL
WBC UA: ABNORMAL /HPF (ref 0–5)

## 2020-09-17 PROCEDURE — 6360000002 HC RX W HCPCS: Performed by: SURGERY

## 2020-09-17 PROCEDURE — 2700000000 HC OXYGEN THERAPY PER DAY

## 2020-09-17 PROCEDURE — 7100000001 HC PACU RECOVERY - ADDTL 15 MIN: Performed by: SURGERY

## 2020-09-17 PROCEDURE — 6360000002 HC RX W HCPCS: Performed by: NURSE ANESTHETIST, CERTIFIED REGISTERED

## 2020-09-17 PROCEDURE — 1210000000 HC MED SURG R&B

## 2020-09-17 PROCEDURE — 3700000001 HC ADD 15 MINUTES (ANESTHESIA): Performed by: SURGERY

## 2020-09-17 PROCEDURE — 86850 RBC ANTIBODY SCREEN: CPT

## 2020-09-17 PROCEDURE — 7100000000 HC PACU RECOVERY - FIRST 15 MIN: Performed by: SURGERY

## 2020-09-17 PROCEDURE — 86900 BLOOD TYPING SEROLOGIC ABO: CPT

## 2020-09-17 PROCEDURE — 36430 TRANSFUSION BLD/BLD COMPNT: CPT

## 2020-09-17 PROCEDURE — C1760 CLOSURE DEV, VASC: HCPCS | Performed by: SURGERY

## 2020-09-17 PROCEDURE — 99221 1ST HOSP IP/OBS SF/LOW 40: CPT | Performed by: SURGERY

## 2020-09-17 PROCEDURE — 2580000003 HC RX 258: Performed by: SURGERY

## 2020-09-17 PROCEDURE — 2500000003 HC RX 250 WO HCPCS: Performed by: NURSE ANESTHETIST, CERTIFIED REGISTERED

## 2020-09-17 PROCEDURE — 3600000013 HC SURGERY LEVEL 3 ADDTL 15MIN: Performed by: SURGERY

## 2020-09-17 PROCEDURE — 85610 PROTHROMBIN TIME: CPT

## 2020-09-17 PROCEDURE — 71045 X-RAY EXAM CHEST 1 VIEW: CPT

## 2020-09-17 PROCEDURE — P9059 PLASMA, FRZ BETWEEN 8-24HOUR: HCPCS

## 2020-09-17 PROCEDURE — U0002 COVID-19 LAB TEST NON-CDC: HCPCS

## 2020-09-17 PROCEDURE — 36415 COLL VENOUS BLD VENIPUNCTURE: CPT

## 2020-09-17 PROCEDURE — 3700000000 HC ANESTHESIA ATTENDED CARE: Performed by: SURGERY

## 2020-09-17 PROCEDURE — 3600000003 HC SURGERY LEVEL 3 BASE: Performed by: SURGERY

## 2020-09-17 PROCEDURE — 6360000002 HC RX W HCPCS: Performed by: ANESTHESIOLOGY

## 2020-09-17 PROCEDURE — 93005 ELECTROCARDIOGRAM TRACING: CPT | Performed by: SURGERY

## 2020-09-17 PROCEDURE — 2709999900 HC NON-CHARGEABLE SUPPLY: Performed by: SURGERY

## 2020-09-17 PROCEDURE — 64488 TAP BLOCK BI INJECTION: CPT | Performed by: NURSE ANESTHETIST, CERTIFIED REGISTERED

## 2020-09-17 PROCEDURE — 86901 BLOOD TYPING SEROLOGIC RH(D): CPT

## 2020-09-17 PROCEDURE — 0WJG0ZZ INSPECTION OF PERITONEAL CAVITY, OPEN APPROACH: ICD-10-PCS | Performed by: SURGERY

## 2020-09-17 RX ORDER — MIRTAZAPINE 30 MG/1
30 TABLET, FILM COATED ORAL DAILY
Status: DISCONTINUED | OUTPATIENT
Start: 2020-09-17 | End: 2020-09-19 | Stop reason: HOSPADM

## 2020-09-17 RX ORDER — ONDANSETRON 2 MG/ML
4 INJECTION INTRAMUSCULAR; INTRAVENOUS
Status: DISCONTINUED | OUTPATIENT
Start: 2020-09-17 | End: 2020-09-17 | Stop reason: HOSPADM

## 2020-09-17 RX ORDER — WARFARIN SODIUM 2.5 MG/1
3.75 TABLET ORAL
Status: DISCONTINUED | OUTPATIENT
Start: 2020-09-17 | End: 2020-09-19 | Stop reason: HOSPADM

## 2020-09-17 RX ORDER — SODIUM CHLORIDE 0.9 % (FLUSH) 0.9 %
10 SYRINGE (ML) INJECTION PRN
Status: DISCONTINUED | OUTPATIENT
Start: 2020-09-17 | End: 2020-09-19 | Stop reason: HOSPADM

## 2020-09-17 RX ORDER — SODIUM CHLORIDE 0.9 % (FLUSH) 0.9 %
10 SYRINGE (ML) INJECTION EVERY 12 HOURS SCHEDULED
Status: DISCONTINUED | OUTPATIENT
Start: 2020-09-17 | End: 2020-09-17 | Stop reason: HOSPADM

## 2020-09-17 RX ORDER — MAGNESIUM SULFATE IN WATER 40 MG/ML
2 INJECTION, SOLUTION INTRAVENOUS PRN
Status: DISCONTINUED | OUTPATIENT
Start: 2020-09-17 | End: 2020-09-19

## 2020-09-17 RX ORDER — SODIUM CHLORIDE, SODIUM LACTATE, POTASSIUM CHLORIDE, CALCIUM CHLORIDE 600; 310; 30; 20 MG/100ML; MG/100ML; MG/100ML; MG/100ML
INJECTION, SOLUTION INTRAVENOUS
Status: DISPENSED
Start: 2020-09-17 | End: 2020-09-17

## 2020-09-17 RX ORDER — METOCLOPRAMIDE HYDROCHLORIDE 5 MG/ML
10 INJECTION INTRAMUSCULAR; INTRAVENOUS
Status: DISCONTINUED | OUTPATIENT
Start: 2020-09-17 | End: 2020-09-17 | Stop reason: HOSPADM

## 2020-09-17 RX ORDER — SODIUM CHLORIDE 0.9 % (FLUSH) 0.9 %
10 SYRINGE (ML) INJECTION PRN
Status: DISCONTINUED | OUTPATIENT
Start: 2020-09-17 | End: 2020-09-17 | Stop reason: HOSPADM

## 2020-09-17 RX ORDER — AMLODIPINE BESYLATE 2.5 MG/1
2.5 TABLET ORAL DAILY
Status: DISCONTINUED | OUTPATIENT
Start: 2020-09-17 | End: 2020-09-19 | Stop reason: HOSPADM

## 2020-09-17 RX ORDER — CARVEDILOL 12.5 MG/1
25 TABLET ORAL 2 TIMES DAILY
Status: DISCONTINUED | OUTPATIENT
Start: 2020-09-17 | End: 2020-09-19 | Stop reason: HOSPADM

## 2020-09-17 RX ORDER — ONDANSETRON 2 MG/ML
4 INJECTION INTRAMUSCULAR; INTRAVENOUS EVERY 6 HOURS PRN
Status: DISCONTINUED | OUTPATIENT
Start: 2020-09-17 | End: 2020-09-19 | Stop reason: HOSPADM

## 2020-09-17 RX ORDER — SODIUM CHLORIDE 9 MG/ML
INJECTION, SOLUTION INTRAVENOUS CONTINUOUS
Status: DISCONTINUED | OUTPATIENT
Start: 2020-09-17 | End: 2020-09-18

## 2020-09-17 RX ORDER — SODIUM CHLORIDE 0.9 % (FLUSH) 0.9 %
10 SYRINGE (ML) INJECTION EVERY 12 HOURS SCHEDULED
Status: DISCONTINUED | OUTPATIENT
Start: 2020-09-17 | End: 2020-09-19 | Stop reason: HOSPADM

## 2020-09-17 RX ORDER — LIDOCAINE HYDROCHLORIDE 20 MG/ML
INJECTION, SOLUTION INTRAVENOUS PRN
Status: DISCONTINUED | OUTPATIENT
Start: 2020-09-17 | End: 2020-09-17 | Stop reason: SDUPTHER

## 2020-09-17 RX ORDER — DEXAMETHASONE SODIUM PHOSPHATE 10 MG/ML
INJECTION INTRAMUSCULAR; INTRAVENOUS PRN
Status: DISCONTINUED | OUTPATIENT
Start: 2020-09-17 | End: 2020-09-17 | Stop reason: SDUPTHER

## 2020-09-17 RX ORDER — SODIUM CHLORIDE, SODIUM LACTATE, POTASSIUM CHLORIDE, CALCIUM CHLORIDE 600; 310; 30; 20 MG/100ML; MG/100ML; MG/100ML; MG/100ML
INJECTION, SOLUTION INTRAVENOUS CONTINUOUS
Status: DISCONTINUED | OUTPATIENT
Start: 2020-09-17 | End: 2020-09-17

## 2020-09-17 RX ORDER — PROPOFOL 10 MG/ML
INJECTION, EMULSION INTRAVENOUS PRN
Status: DISCONTINUED | OUTPATIENT
Start: 2020-09-17 | End: 2020-09-17 | Stop reason: SDUPTHER

## 2020-09-17 RX ORDER — MAGNESIUM HYDROXIDE 1200 MG/15ML
LIQUID ORAL CONTINUOUS PRN
Status: COMPLETED | OUTPATIENT
Start: 2020-09-17 | End: 2020-09-17

## 2020-09-17 RX ORDER — HYDROCODONE BITARTRATE AND ACETAMINOPHEN 5; 325 MG/1; MG/1
2 TABLET ORAL PRN
Status: DISCONTINUED | OUTPATIENT
Start: 2020-09-17 | End: 2020-09-17 | Stop reason: HOSPADM

## 2020-09-17 RX ORDER — CEFAZOLIN SODIUM 2 G/50ML
2 SOLUTION INTRAVENOUS ONCE
Status: COMPLETED | OUTPATIENT
Start: 2020-09-17 | End: 2020-09-17

## 2020-09-17 RX ORDER — DIPHENHYDRAMINE HYDROCHLORIDE 50 MG/ML
12.5 INJECTION INTRAMUSCULAR; INTRAVENOUS
Status: DISCONTINUED | OUTPATIENT
Start: 2020-09-17 | End: 2020-09-17 | Stop reason: HOSPADM

## 2020-09-17 RX ORDER — FENTANYL CITRATE 50 UG/ML
50 INJECTION, SOLUTION INTRAMUSCULAR; INTRAVENOUS EVERY 10 MIN PRN
Status: DISCONTINUED | OUTPATIENT
Start: 2020-09-17 | End: 2020-09-17 | Stop reason: HOSPADM

## 2020-09-17 RX ORDER — PROMETHAZINE HYDROCHLORIDE 12.5 MG/1
12.5 TABLET ORAL EVERY 6 HOURS PRN
Status: DISCONTINUED | OUTPATIENT
Start: 2020-09-17 | End: 2020-09-19 | Stop reason: HOSPADM

## 2020-09-17 RX ORDER — POTASSIUM CHLORIDE 7.45 MG/ML
10 INJECTION INTRAVENOUS PRN
Status: DISCONTINUED | OUTPATIENT
Start: 2020-09-17 | End: 2020-09-19

## 2020-09-17 RX ORDER — SUCCINYLCHOLINE/SOD CL,ISO/PF 100 MG/5ML
SYRINGE (ML) INTRAVENOUS PRN
Status: DISCONTINUED | OUTPATIENT
Start: 2020-09-17 | End: 2020-09-17 | Stop reason: SDUPTHER

## 2020-09-17 RX ORDER — LIDOCAINE HYDROCHLORIDE 10 MG/ML
1 INJECTION, SOLUTION EPIDURAL; INFILTRATION; INTRACAUDAL; PERINEURAL
Status: DISCONTINUED | OUTPATIENT
Start: 2020-09-17 | End: 2020-09-17 | Stop reason: HOSPADM

## 2020-09-17 RX ORDER — ROCURONIUM BROMIDE 10 MG/ML
INJECTION, SOLUTION INTRAVENOUS PRN
Status: DISCONTINUED | OUTPATIENT
Start: 2020-09-17 | End: 2020-09-17 | Stop reason: SDUPTHER

## 2020-09-17 RX ORDER — ROPIVACAINE HYDROCHLORIDE 5 MG/ML
INJECTION, SOLUTION EPIDURAL; INFILTRATION; PERINEURAL PRN
Status: DISCONTINUED | OUTPATIENT
Start: 2020-09-17 | End: 2020-09-17 | Stop reason: SDUPTHER

## 2020-09-17 RX ORDER — ONDANSETRON 2 MG/ML
INJECTION INTRAMUSCULAR; INTRAVENOUS PRN
Status: DISCONTINUED | OUTPATIENT
Start: 2020-09-17 | End: 2020-09-17 | Stop reason: SDUPTHER

## 2020-09-17 RX ORDER — HYDROCODONE BITARTRATE AND ACETAMINOPHEN 5; 325 MG/1; MG/1
1 TABLET ORAL PRN
Status: DISCONTINUED | OUTPATIENT
Start: 2020-09-17 | End: 2020-09-17 | Stop reason: HOSPADM

## 2020-09-17 RX ORDER — 0.9 % SODIUM CHLORIDE 0.9 %
20 INTRAVENOUS SOLUTION INTRAVENOUS ONCE
Status: DISCONTINUED | OUTPATIENT
Start: 2020-09-17 | End: 2020-09-19 | Stop reason: HOSPADM

## 2020-09-17 RX ORDER — MEPERIDINE HYDROCHLORIDE 25 MG/ML
12.5 INJECTION INTRAMUSCULAR; INTRAVENOUS; SUBCUTANEOUS EVERY 5 MIN PRN
Status: DISCONTINUED | OUTPATIENT
Start: 2020-09-17 | End: 2020-09-17 | Stop reason: HOSPADM

## 2020-09-17 RX ADMIN — ONDANSETRON 4 MG: 2 INJECTION INTRAMUSCULAR; INTRAVENOUS at 14:25

## 2020-09-17 RX ADMIN — ROPIVACAINE HYDROCHLORIDE 20 ML: 5 INJECTION, SOLUTION EPIDURAL; INFILTRATION; PERINEURAL at 13:39

## 2020-09-17 RX ADMIN — Medication 10 ML: at 10:10

## 2020-09-17 RX ADMIN — SODIUM CHLORIDE: 9 INJECTION, SOLUTION INTRAVENOUS at 20:21

## 2020-09-17 RX ADMIN — ROPIVACAINE HYDROCHLORIDE 20 ML: 5 INJECTION, SOLUTION EPIDURAL; INFILTRATION; PERINEURAL at 13:42

## 2020-09-17 RX ADMIN — Medication 10 ML: at 20:22

## 2020-09-17 RX ADMIN — SODIUM CHLORIDE: 9 INJECTION, SOLUTION INTRAVENOUS at 11:22

## 2020-09-17 RX ADMIN — SUGAMMADEX 175 MG: 100 INJECTION, SOLUTION INTRAVENOUS at 14:29

## 2020-09-17 RX ADMIN — PROPOFOL 60 MG: 10 INJECTION, EMULSION INTRAVENOUS at 13:32

## 2020-09-17 RX ADMIN — DEXAMETHASONE SODIUM PHOSPHATE 5 MG: 10 INJECTION INTRAMUSCULAR; INTRAVENOUS at 13:42

## 2020-09-17 RX ADMIN — DEXAMETHASONE SODIUM PHOSPHATE 5 MG: 10 INJECTION INTRAMUSCULAR; INTRAVENOUS at 13:39

## 2020-09-17 RX ADMIN — CEFAZOLIN SODIUM 2 G: 2 SOLUTION INTRAVENOUS at 13:38

## 2020-09-17 RX ADMIN — ENOXAPARIN SODIUM 30 MG: 30 INJECTION SUBCUTANEOUS at 20:30

## 2020-09-17 RX ADMIN — LIDOCAINE HYDROCHLORIDE 40 MG: 20 INJECTION, SOLUTION INTRAVENOUS at 13:32

## 2020-09-17 RX ADMIN — ROCURONIUM BROMIDE 50 MG: 10 INJECTION INTRAVENOUS at 13:41

## 2020-09-17 RX ADMIN — Medication 100 MG: at 13:32

## 2020-09-17 RX ADMIN — DEXAMETHASONE SODIUM PHOSPHATE 10 MG: 10 INJECTION INTRAMUSCULAR; INTRAVENOUS at 13:58

## 2020-09-17 RX ADMIN — ENOXAPARIN SODIUM 30 MG: 30 INJECTION SUBCUTANEOUS at 02:42

## 2020-09-17 ASSESSMENT — PAIN SCALES - GENERAL
PAINLEVEL_OUTOF10: 0

## 2020-09-17 ASSESSMENT — PULMONARY FUNCTION TESTS
PIF_VALUE: 17
PIF_VALUE: 29
PIF_VALUE: 29
PIF_VALUE: 28
PIF_VALUE: 20
PIF_VALUE: 28
PIF_VALUE: 4
PIF_VALUE: 29
PIF_VALUE: 16
PIF_VALUE: 28
PIF_VALUE: 1
PIF_VALUE: 1
PIF_VALUE: 20
PIF_VALUE: 28
PIF_VALUE: 17
PIF_VALUE: 28
PIF_VALUE: 17
PIF_VALUE: 27
PIF_VALUE: 1
PIF_VALUE: 30
PIF_VALUE: 29
PIF_VALUE: 1
PIF_VALUE: 12
PIF_VALUE: 2
PIF_VALUE: 28
PIF_VALUE: 31
PIF_VALUE: 16
PIF_VALUE: 28
PIF_VALUE: 19
PIF_VALUE: 17
PIF_VALUE: 21
PIF_VALUE: 19
PIF_VALUE: 1
PIF_VALUE: 17
PIF_VALUE: 28
PIF_VALUE: 28
PIF_VALUE: 12
PIF_VALUE: 28
PIF_VALUE: 18
PIF_VALUE: 17
PIF_VALUE: 2
PIF_VALUE: 17
PIF_VALUE: 28
PIF_VALUE: 28
PIF_VALUE: 17
PIF_VALUE: 18
PIF_VALUE: 17
PIF_VALUE: 17
PIF_VALUE: 30
PIF_VALUE: 17
PIF_VALUE: 18
PIF_VALUE: 28
PIF_VALUE: 28
PIF_VALUE: 17
PIF_VALUE: 29
PIF_VALUE: 28
PIF_VALUE: 18
PIF_VALUE: 17
PIF_VALUE: 12
PIF_VALUE: 17
PIF_VALUE: 29
PIF_VALUE: 0

## 2020-09-17 NOTE — PROGRESS NOTES
Pt assessment and vitals complete and stable. Patient receiving 2 units of fresh frozen plasma, tolerating well. Pt denies nausea, or pain. Patient NG draining thin dark brown drainage.  Will continue to monitor

## 2020-09-17 NOTE — ANESTHESIA POSTPROCEDURE EVALUATION
Department of Anesthesiology  Postprocedure Note    Patient: Iraj Hicks  MRN: [id number]  YOB: 1929  Date of evaluation: 9/17/2020  Time:  2:43 PM     Procedure Summary     Date:  09/17/20 Room / Location:  [address]    Anesthesia Start:  9060 Anesthesia Stop:  9864    Procedure:  LAPAROSCOPY EXPLORATORY (N/A Abdomen) Diagnosis:  (small bowel obstruction)    Surgeon:  Pedro Bolden MD Responsible Provider:  ALISE Bradley CRNA    Anesthesia Type:  general ASA Status:  3 - Emergent          Anesthesia Type: general    Beny Phase I:      Beny Phase II:      Last vitals: Reviewed and per EMR flowsheets.        Anesthesia Post Evaluation    Patient location during evaluation: PACU  Patient participation: complete - patient participated  Level of consciousness: awake and alert  Pain score: 0  Airway patency: patent  Nausea & Vomiting: no nausea and no vomiting  Complications: no  Cardiovascular status: blood pressure returned to baseline and hemodynamically stable  Respiratory status: acceptable  Hydration status: euvolemic

## 2020-09-17 NOTE — OP NOTE
Operative Note      Patient: Karo Wilkinson  YOB: 1929  MRN: [id number]    Date of Procedure: 9/17/2020    Pre-Op Diagnosis: small bowel obstruction    Post-Op Diagnosis: Same       Procedure(s):  LAPAROSCOPY EXPLORATORY    Surgeon(s):  Bola oJseph MD    Assistant:   Sherin Butcher PA-C    Anesthesia: General    Estimated Blood Loss (mL): Minimal    Complications: None    Specimens:   * No specimens in log *    Implants:  * No implants in log *      Drains:   NG/OG/NJ/NE Tube Nasogastric 14 fr Right nostril (Active)   Surrounding Skin Intact 09/17/20 0217   Securement device Yes 09/17/20 0217   Status Suction-low intermittent 09/17/20 0217   Placement Verified by Gastric Contents 09/17/20 0217   NG/OG/NJ/NE External Measurement (cm) 57 cm 09/17/20 0217   Drainage Appearance Brown 09/17/20 0503   Output (mL) 75 ml 09/17/20 0503       Findings: There was no evidence of small bowel obstruction other than some mild hyperemia of the ileum wall. There were no significant adhesions. There were no hernias. The bowel was run twice without evidence of obstruction. INDICATIONS: This is a 80year old female who presented to the ED early this morning with symptoms consistent with recurrent small bowel obstruction. An NG was placed in the ED and she was admitted tot he floor. She was consented for laparoscopy, possible laparotomy. She was on coumadin for DVT which was reversed with 2U FFP. PROCEDURE IN DETAIL:   After informed consent was obtained, the patient was brought to the operating room, placed supine on the operating room table. Anesthesia was induced. The patient was intubated. Her left arm was tucked at the side. The abdomen was prepped and draped in the usual sterile fashion. A time-out was performed by all parties involved. Ancef was given  as preoperative antibiotic. SCDs were used for DVT prophylaxis. We began with Veress needle insertion in the left upper abdomen.   The abdomen was subsequently insufflated with CO2 gas. Next a 5 mm visiport was used to gain access through the same incision used for insufflation with the Veress needle. There was no evidence for injury while gaining access. Additional 5mm ports were placed in the suprapubic area as well as the left lower quadrant. The cecum was identified followed by the terminal ileum. Using atraumatic graspers, the small bowel was traced back to the Ligament of Treitz. There was a small amount of hyperemia noted within the ileum, but no other evidence of obstruction. The bowel wall was decompressed throughout. Once we arrived at the Ligament of Treitz, the bowel was again run back to the terminal ileum where no injury or evidence of obstruction was identified. This completed the procedure. The trocars were removed under direct observation and the abdomen was decompressed. The skin wounds were closed in a subcuticular fashion with 4-0 vicryl. Dermabond was applied. All sponges and needle counts were correct at the end of the case. I was present and scrubbed for the entire case. The patient tolerated the procedure well and was transported to the PACU in good condition.      Electronically signed by Giovanni Castro MD on 9/17/2020 at 2:48 PM

## 2020-09-17 NOTE — ED NOTES
Pt medicated per orders, tr. Well. Pt stable, 0 distress, a&ox4, will monitor, pt's daughter at bedside.      Cathi Jones RN  09/16/20 212

## 2020-09-17 NOTE — H&P
EMERGENCY GENERAL SURGERY CONSULTATION / H&P    Reason for Consultation:  Small bowel obstruction  Consulting Provider: No att. providers found    HISTORY OF PRESENT INJURY:   Lyubov Holland is a 80 y.o. female presenting to the ED with recurrent SBO. She was last here in July with SBO, treated non-operatively. She has had 1 day of symptoms. Started with lower abdominal pain followed by nausea and vomiting. Last BM was this morning. She started having bowel obstructions in March of this year and has had them every couple of months. She was offered surgery last admission, but declined. Surgical history of appendectomy, cholecystectomy and hysterectomy. She is on coumadin for DVT.     PMH:    Past Medical History:   Diagnosis Date    Arthritis     Disease of blood and blood forming organ     DVT (deep vein thrombosis) in pregnancy     History of blood transfusion     Hx of blood clots     Pulmonary    Hyperlipidemia 1/26/2011    Hypertension     PONV (postoperative nausea and vomiting)     vomiting    Presence of IVC filter     Pulmonary embolism (HCC)     SBO (small bowel obstruction) (HCC)     Wrist fracture, right        PSH:    Past Surgical History:   Procedure Laterality Date    APPENDECTOMY      CATARACT REMOVAL Bilateral     CHOLECYSTECTOMY      EYE SURGERY      Phlco bilateral implants    HEMORRHOID SURGERY      HYSTERECTOMY      KNEE SURGERY Right     [place] TOTAL KNEE ARTHROPLASTY Left 1/9/2018    LT KNEE TOTAL ARTHROPLASTY SUPINE, TANESHA PSI, AM ADMIT, SPINAL performed by Enmanuel Granados MD at [facility] Right        FH:    Family History   Problem Relation Age of Onset    Cancer Mother     Cancer Father     Cancer Sister         colon    Cancer Brother         colon    Arthritis Daughter     Arthritis Son        SH:    Social History     Tobacco Use    Smoking status: Never Smoker    Smokeless tobacco: Never Used   Substance Use Topics    Alcohol use: No    Drug use: No       Home Medications:  No current facility-administered medications on file prior to encounter. Current Outpatient Medications on File Prior to Encounter   Medication Sig Dispense Refill    warfarin (COUMADIN) 7.5 MG tablet Take 0.5 tablets by mouth Twice a Week 30 tablet 3    clonazePAM (KLONOPIN) 0.5 MG tablet Take 0.5 mg by mouth 2 times daily as needed for Anxiety.  amLODIPine (NORVASC) 2.5 MG tablet Take 1 tablet by mouth daily 30 tablet 3    acetaminophen (TYLENOL) 650 MG extended release tablet Take 500 mg by mouth every 6-8 hours as needed       carvedilol (COREG) 25 MG tablet Take 25 mg by mouth 2 times daily       Cholecalciferol 2000 UNITS CAPS Take 1 capsule by mouth 2 times daily       losartan (COZAAR) 50 MG tablet Take 50 mg by mouth      mirtazapine (REMERON) 30 MG tablet Take 30 mg by mouth       potassium chloride (KLOR-CON M) 10 MEQ extended release tablet Take 10 mEq by mouth 2 times daily          Review Of Systems:  Constitutional: Negative for  weight loss  HENT: Negative for congestion, facial swelling and bloody nose  Eyes: Negative for  vision changes  Respiratory: Negative for shortness of breath, difficulty breathing  Cardiovascular: Negative for chest wall pain. Gastrointestinal: Positive for abdominal pain, distention, vomiting  Genitourinary: Negative for  hematuria  Musculoskeletal: Negative for gait difficulties   Skin: Negative for bruising, abrasions  Neurological: Negative for dizziness, weakness and light-headedness. Hematological: Negative for easy bruising/bleeding  Psychiatric/Behavioral: Negative for behavioral problems. Except as noted above the remainder of the review of systems was reviewed and negative.      PHYSICAL EXAM:  Vitals: BP (!) 145/84   Pulse 78   Temp 98 °F (36.7 °C) (Oral)   Resp 18   Ht 5' 3.5\" (1.613 m)   Wt 180 lb (81.6 kg)   SpO2 95%   BMI 31.39 kg/m²   Constituational: No distress, uncomfortable  Cardiovascular: Regular rate and rhythm. Pulmonary: Clear to auscultation bilaterally. No acute distress or labored breathing. Symmetric chest rise. Abdominal: Distended, mildly tender  Musculoskeletal: Good ROM in all extremities. Neurological: Alert, awake, and oriented x 3. Motor and sensory grossly intact. GCS of 15. No focal deficits. BASIC LABS:  CBC with Differential:    Lab Results   Component Value Date    WBC 10.6 09/16/2020    RBC 4.90 09/16/2020    HGB 14.5 09/16/2020    HCT 44.2 09/16/2020     09/16/2020    MCV 90.2 09/16/2020    MCH 29.6 09/16/2020    MCHC 32.8 09/16/2020    RDW 15.1 09/16/2020    LYMPHOPCT 9.2 09/16/2020    MONOPCT 5.9 09/16/2020    BASOPCT 0.6 09/16/2020    MONOSABS 0.6 09/16/2020    LYMPHSABS 1.0 09/16/2020    EOSABS 0.1 09/16/2020    BASOSABS 0.1 09/16/2020     CMP:    Lab Results   Component Value Date     09/16/2020    K 4.2 09/16/2020    K 3.4 07/13/2020     09/16/2020    CO2 24 09/16/2020    BUN 9 09/16/2020    CREATININE 0.74 09/16/2020    GFRAA >60.0 09/16/2020    LABGLOM >60.0 09/16/2020    GLUCOSE 138 09/16/2020    PROT 7.2 09/16/2020    LABALBU 3.7 09/16/2020    CALCIUM 9.8 09/16/2020    BILITOT 0.8 09/16/2020    ALKPHOS 127 09/16/2020    AST 20 09/16/2020    ALT 9 09/16/2020     Magnesium:  Lab Results   Component Value Date    MG 2.0 07/13/2020     Troponin:    Lab Results   Component Value Date    TROPONINI <0.010 06/08/2020     INR pending    IMAGING:  CT A/P:  On my read, she has a small bowel obstruction with transition point in the right lower abdomen. There is a small amount of ascites. ASSESSMENT/RECOMMENDATIONS:  1. Admit to floor  2. NG tube  3. Check INR  4. Patient agreeable to surgery at this time  5.  Will likely need INR reversed      Doris Hicks  Emergency General Surgery  [phone] ([id number]) [phone]

## 2020-09-17 NOTE — ED NOTES
Pt's sats o2 98% ra, o2 2l nc administered, pt tr. Well. Franck felder notified, 0 new orders. Pt stable, a&ox4,s kin w/d/pink, 0 cough, 0 distress, 0 pain, 0 n&v, will monitor.      Dean Muse RN  09/16/20 7784

## 2020-09-17 NOTE — PROGRESS NOTES
Update History & Physical    The patient's History and Physical of September 17, 2020 was reviewed with the patient and I examined the patient. There was no change. The surgical site was confirmed by the patient and me. Plan: The risks, benefits, expected outcome, and alternative to the recommended procedure have been discussed with the patient. Patient understands and wants to proceed with the procedure. Plan for OR today, diagnostic laparoscopy with adhesiolysis, possible laparotomy. Maintain NG tube for now. Will start gentle hydration. Holding all PO medications due to bowel obstruction. Coagulopathy secondary to coumadin, 2U FFP ordered for reversal, INR was 2.0 this morning.       Electronically signed by Melissa Bell MD on 9/17/2020 at 8:47 AM

## 2020-09-17 NOTE — ED NOTES
Pt stable, resting in bed, 0 distress, 0 n&v, 0 problems. will monitor.      Norma Parisi RN  09/16/20 2454

## 2020-09-17 NOTE — ED NOTES
Pt medicated for nausea. Pt given warm blankets per her request.  Pt given mouth sponges and water for c/o dry mouth. Daughter at bedside.   Call bell in reach     Min cMgowan, ROCKY  09/16/20 3295

## 2020-09-17 NOTE — PLAN OF CARE
Problem: Falls - Risk of:  Goal: Will remain free from falls  Description: Will remain free from falls  9/17/2020 1013 by Randi Cooper RN  Outcome: Ongoing  9/17/2020 0932 by Randi Cooper RN  Outcome: Ongoing  9/17/2020 0219 by Clint Grajeda RN  Outcome: Ongoing

## 2020-09-17 NOTE — ED NOTES
Ng inserted, pt tr. Well. 0 distress, 0 sob, skin w/d/pink, pulses palp, msp's intact, ng attached to low intermittent suction. Approx. 300cc brown fluid noted in suction container. Will monitor.      Nayely Michelle RN  09/17/20 5945

## 2020-09-17 NOTE — PLAN OF CARE
Problem: Falls - Risk of:  Goal: Will remain free from falls  Description: Will remain free from falls  9/17/2020 0932 by Arturo Dsouza RN  Outcome: Ongoing  9/17/2020 0219 by Laurence Rangel RN  Outcome: Ongoing  Goal: Absence of physical injury  Description: Absence of physical injury  9/17/2020 0219 by Laurence Rangel RN  Outcome: Ongoing

## 2020-09-17 NOTE — ANESTHESIA PROCEDURE NOTES
TAP block    Patient location during procedure: pre-op  Start time: 9/17/2020 1:36 PM  End time: 9/17/2020 1:42 PM  Staffing  Anesthesiologist: Adriana Pittman MD  Performed: anesthesiologist   Preanesthetic Checklist  Completed: patient identified, site marked, surgical consent, pre-op evaluation, timeout performed, IV checked, risks and benefits discussed, monitors and equipment checked, anesthesia consent given, oxygen available and patient being monitored  Peripheral Block  Patient position: supine  Prep: ChloraPrep  Patient monitoring: cardiac monitor, continuous pulse ox, frequent blood pressure checks and IV access  Block type: TAP  Laterality: bilateral  Injection technique: single-shot  Procedures: ultrasound guided and IP approach, probe cover employed  Local infiltration: lidocaine  Provider prep: mask and sterile gloves  Local infiltration: lidocaine  Needle  Needle type: combined needle/nerve stimulator   Needle gauge: 21 G  Needle length: 10 cm  Needle localization: ultrasound guidance (IP approach, probe cover employed)  Assessment  Injection assessment: negative aspiration for heme, no paresthesia on injection and local visualized surrounding nerve on ultrasound  Paresthesia pain: none  Slow fractionated injection: yes  Hemodynamics: stable  Additional Notes  20cc 0.5% ropivacaine with decadron 5mg injected each side  Reason for block: post-op pain management

## 2020-09-17 NOTE — PROGRESS NOTES
Patient admitted into room 463. Vital signs are stable. Currently no pain but is having nausea. Last Zofran administered 2235 in ER. Home medications reviewed. Doctor notified. NG 12fr is in place, 62. Currently on low intermittent suction per MD. Fall precautions are in place. Call light in reach. Will continue to monitor. 0230 - Patient had smear bowel movement in toilet. Resting in bed.     0500 - Patient NG 75ml brown output. Patient pulled tubing apart and leaked onto to gown. Gown changed and tubing connection fixed. 500ml in ER.

## 2020-09-17 NOTE — ED NOTES
Obt. Blood by squad, blood to lab. Pt c/o nausea, deanna felder ordering meds.      Pennie Hoff RN  09/16/20 7979

## 2020-09-17 NOTE — ED NOTES
Waiting ct, vincent livingston called and updated on istat, per vincent he will call when ready.      Fahad Serrano RN  09/16/20 2801

## 2020-09-17 NOTE — ANESTHESIA PRE PROCEDURE
Department of Anesthesiology  Preprocedure Note       Name:  Ila Baron   Age:  80 y.o.  :  1929                                          MRN:  [id number]         Date:  2020      Surgeon: Eneida Davis):  Praveena Riddle MD    Procedure: Procedure(s):  LAPAROSCOPY EXPLORATORY NJFL 044  LAPAROTOMY EXPLORATORY    Medications prior to admission:   Prior to Admission medications    Medication Sig Start Date End Date Taking? Authorizing Provider   warfarin (COUMADIN) 7.5 MG tablet Take 0.5 tablets by mouth Twice a Week 20  Yes Harrison Contreras,    clonazePAM (KLONOPIN) 0.5 MG tablet Take 0.5 mg by mouth 2 times daily as needed for Anxiety.  20 Yes Historical Provider, MD   amLODIPine (NORVASC) 2.5 MG tablet Take 1 tablet by mouth daily 10/25/19  Yes Tabitha Marin DO   acetaminophen (TYLENOL) 650 MG extended release tablet Take 500 mg by mouth every 6-8 hours as needed  12  Yes Historical Provider, MD   carvedilol (COREG) 25 MG tablet Take 25 mg by mouth 2 times daily  16  Yes Historical Provider, MD   Cholecalciferol 2000 UNITS CAPS Take 1 capsule by mouth 2 times daily  4/3/17  Yes Historical Provider, MD   losartan (COZAAR) 50 MG tablet Take 50 mg by mouth 17  Yes Historical Provider, MD   mirtazapine (REMERON) 30 MG tablet Take 30 mg by mouth  10/14/16  Yes Historical Provider, MD   potassium chloride (KLOR-CON M) 10 MEQ extended release tablet Take 10 mEq by mouth 2 times daily  16  Yes Historical Provider, MD       Current medications:    Current Facility-Administered Medications   Medication Dose Route Frequency Provider Last Rate Last Dose    sodium chloride flush 0.9 % injection 10 mL  10 mL Intravenous 2 times per day Praveena Riddle MD   10 mL at 20 1010    sodium chloride flush 0.9 % injection 10 mL  10 mL Intravenous PRN Praveena Riddle MD        promethazine (PHENERGAN) tablet 12.5 mg  12.5 mg Oral Q6H PRN Praveena Riddle MD        Or  ondansetron (ZOFRAN) injection 4 mg  4 mg Intravenous Q6H PRN Janne Phoenix, MD        potassium chloride 10 mEq/100 mL IVPB (Peripheral Line)  10 mEq Intravenous PRN Janne Phoenix, MD        magnesium sulfate 2 g in 50 mL IVPB premix  2 g Intravenous PRN Janne Phoenix, MD        enoxaparin (LOVENOX) injection 30 mg  30 mg Subcutaneous BID Janne Phoenix, MD   30 mg at 09/17/20 0242    benzocaine (HURRICAINE) 20 % oral spray   Mouth/Throat 4x Daily Janne Phoenix, MD        0.9 % sodium chloride bolus  20 mL Intravenous Once Janne Phoenix, MD        0.9 % sodium chloride infusion   Intravenous Continuous Janne Phoenix, MD           Allergies:     Allergies   Allergen Reactions    Celebrex [Celecoxib]      Rash    Cymbalta [Duloxetine Hcl]      nausea    Diltiazem      Unknown    Lisinopril      Unknown       Problem List:    Patient Active Problem List   Diagnosis Code    Degenerative joint disease of knee, left M17.12    S/P total knee replacement using cement, left Z96.652    Anxiety state F41.1    HTN (hypertension) I10    Hyperlipidemia E78.5    PAF (paroxysmal atrial fibrillation) (HCC) I48.0    Anemia D64.9    Syncope and collapse R55    SBO (small bowel obstruction) ([facility] [address] K56.609       Past Medical History:        Diagnosis Date    Arthritis     Disease of blood and blood forming organ     DVT (deep vein thrombosis) in pregnancy     History of blood transfusion     Hx of blood clots     Pulmonary    Hyperlipidemia 1/26/2011    Hypertension     PONV (postoperative nausea and vomiting)     vomiting    Presence of IVC filter     Pulmonary embolism (HCC)     [facility] obstruction) ([facility])     Wrist fracture, right        Past Surgical History:        Procedure Laterality Date    APPENDECTOMY      CATARACT REMOVAL Bilateral     CHOLECYSTECTOMY      EYE SURGERY      [employer] bilateral implants    HEMORRHOID SURGERY      HYSTERECTOMY      KNEE hours.    Coags:   Lab Results   Component Value Date    PROTIME 22.8 09/16/2020    INR 2.0 09/16/2020    APTT 37.8 06/08/2020       HCG (If Applicable): No results found for: PREGTESTUR, PREGSERUM, HCG, HCGQUANT     ABGs: No results found for: PHART, PO2ART, APG7CQI, ZTT0LEC, BEART, H5DSUGLP     Type & Screen (If Applicable):  No results found for: LABABO, LABRH    Drug/Infectious Status (If Applicable):  No results found for: HIV, HEPCAB    COVID-19 Screening (If Applicable): No results found for: COVID19      Anesthesia Evaluation  Patient summary reviewed and Nursing notes reviewed   history of anesthetic complications: PONV. Airway: Mallampati: II  TM distance: >3 FB   Neck ROM: full  Mouth opening: > = 3 FB Dental:      Comment: Poor dentition    Pulmonary:Negative Pulmonary ROS                              Cardiovascular:    (+) hypertension:, dysrhythmias: atrial fibrillation,          Beta Blocker:  No for medical reason         Neuro/Psych:   Negative Neuro/Psych ROS              GI/Hepatic/Renal:   (+) morbid obesity (class I obesity)         ROS comment: Small  Bowel obstruction  . Endo/Other:    (+) blood dyscrasia: anticoagulation therapy:., .                 Abdominal:           Vascular:   + DVT, .        ROS comment: IVC filter. Anesthesia Plan      general     ASA 3 - emergent       Induction: intravenous. MIPS: Postoperative opioids intended and Prophylactic antiemetics administered. Anesthetic plan and risks discussed with patient. Plan discussed with CRNA.     Attending anesthesiologist reviewed and agrees with Gary Allen MD   9/17/2020

## 2020-09-17 NOTE — ED PROVIDER NOTES
Psychiatric/Behavioral: Negative for behavioral problems, self-injury and sleep disturbance. All other systems reviewed and are negative. Except as noted above the remainder of the review of systems was reviewed and negative. PAST MEDICAL HISTORY     Past Medical History:   Diagnosis Date    Arthritis     Disease of blood and blood forming organ     DVT (deep vein thrombosis) in pregnancy     History of blood transfusion     Hx of blood clots     Pulmonary    Hyperlipidemia 1/26/2011    Hypertension     PONV (postoperative nausea and vomiting)     vomiting    Presence of IVC filter     Pulmonary embolism (HCC)     SBO (small bowel obstruction) (HCC)     Wrist fracture, right          SURGICALHISTORY       Past Surgical History:   Procedure Laterality Date    APPENDECTOMY      CATARACT REMOVAL Bilateral     CHOLECYSTECTOMY      EYE SURGERY      [employer] bilateral implants    HEMORRHOID SURGERY      HYSTERECTOMY      KNEE SURGERY Right     MN TOTAL KNEE ARTHROPLASTY Left 1/9/2018    LT KNEE TOTAL ARTHROPLASTY SUPINE, TANESHA PSI, AM ADMIT, SPINAL performed by Jody Elliott MD at [facility]          CURRENT MEDICATIONS       Current Discharge Medication List      CONTINUE these medications which have NOT CHANGED    Details   warfarin (COUMADIN) 7.5 MG tablet Take 0.5 tablets by mouth Twice a Week  Qty: 30 tablet, Refills: 3      clonazePAM (KLONOPIN) 0.5 MG tablet Take 0.5 mg by mouth 2 times daily as needed for Anxiety.       amLODIPine (NORVASC) 2.5 MG tablet Take 1 tablet by mouth daily  Qty: 30 tablet, Refills: 3      acetaminophen (TYLENOL) 650 MG extended release tablet Take 500 mg by mouth every 6-8 hours as needed       carvedilol (COREG) 25 MG tablet Take 25 mg by mouth 2 times daily       Cholecalciferol 2000 UNITS CAPS Take 1 capsule by mouth 2 times daily       losartan (COZAAR) 50 MG tablet Take 50 mg by mouth      mirtazapine (REMERON) 30 MG tablet Take 30 mg by mouth       potassium chloride (KLOR-CON M) 10 MEQ extended release tablet Take 10 mEq by mouth 2 times daily              ALLERGIES     Celebrex [celecoxib]; Cymbalta [duloxetine hcl]; Diltiazem;  Lisinopril; Oxycodone-acetaminophen; and Sulfamethoxazole-trimethoprim    FAMILY HISTORY       Family History   Problem Relation Age of Onset    Cancer Mother     Cancer Father     Cancer Sister         colon    Cancer Brother         colon    Arthritis Daughter     Arthritis Son           SOCIAL HISTORY       Social History     Socioeconomic History    Marital status:      Spouse name: None    Number of children: None    Years of education: None    Highest education level: None   Occupational History    None   Social Needs    Financial resource strain: None    Food insecurity     Worry: None     Inability: None    Transportation needs     Medical: None     Non-medical: None   Tobacco Use    Smoking status: Never Smoker    Smokeless tobacco: Never Used   Substance and Sexual Activity    Alcohol use: No    Drug use: No    Sexual activity: None   Lifestyle    Physical activity     Days per week: None     Minutes per session: None    Stress: None   Relationships    Social connections     Talks on phone: None     Gets together: None     Attends Jain service: None     Active member of club or organization: None     Attends meetings of clubs or organizations: None     Relationship status: None    Intimate partner violence     Fear of current or ex partner: None     Emotionally abused: None     Physically abused: None     Forced sexual activity: None   Other Topics Concern    None   Social History Narrative    None       SCREENINGS    Ella Coma Scale  Eye Opening: Spontaneous  Best Verbal Response: Oriented  Best Motor Response: Obeys commands  [place] Coma Scale Score: 15 @FLOW([id number])@      PHYSICAL EXAM    (up to 7 for level 4, 8 or more for level 5)     ED Triage Vitals   BP Temp Temp src Pulse Resp SpO2 Height Weight   -- -- -- -- -- -- -- --       Physical Exam  Vitals signs and nursing note reviewed. Constitutional:       General: She is not in acute distress. Appearance: She is well-developed. HENT:      Head: Normocephalic and atraumatic. Right Ear: External ear normal. There is no impacted cerumen. Left Ear: External ear normal. There is no impacted cerumen. Nose: Nose normal.      Mouth/Throat:      Mouth: Mucous membranes are dry. Pharynx: No oropharyngeal exudate or posterior oropharyngeal erythema. Eyes:      General:         Right eye: No discharge. Left eye: No discharge. Extraocular Movements: Extraocular movements intact. Pupils: Pupils are equal, round, and reactive to light. Neck:      Musculoskeletal: Normal range of motion and neck supple. Cardiovascular:      Rate and Rhythm: Normal rate and regular rhythm. Heart sounds: Normal heart sounds. Pulmonary:      Effort: Pulmonary effort is normal. No respiratory distress. Breath sounds: Normal breath sounds. No stridor. Abdominal:      General: Bowel sounds are normal. There is no distension. Palpations: Abdomen is soft. Tenderness: There is abdominal tenderness. Musculoskeletal: Normal range of motion. Skin:     General: Skin is warm. Findings: No erythema. Neurological:      Mental Status: She is alert and oriented to person, place, and time.    Psychiatric:         Mood and Affect: Mood normal.         DIAGNOSTIC RESULTS     EKG: All EKG's are interpreted by the Emergency Department Physician who either signs or Co-signsthis chart in the absence of a cardiologist.         RADIOLOGY:   Non-plain filmimages such as CT, Ultrasound and MRI are read by the radiologist. Plain radiographic images are visualized and preliminarily interpreted by the emergency physician with the below findings:    See rad report    Interpretation per the Radiologist below, if available at the time ofthis note:    RADIOLOGY REPORT   Final Result      XR CHEST PORTABLE   Final Result      POOR INSPIRATION WITH MILD BIBASILAR INFILTRATE/ATELECTASIS, WORSE ON THE RIGHT. BRONCHOPNEUMONIA OR ASPIRATION SHOULD BE EXCLUDED CLINICALLY. NASOGASTRIC TUBE IN EXPECTED POSITION. CT ABDOMEN PELVIS W IV CONTRAST Additional Contrast? None   Final Result   1. PERSISTENT AREA OF ATELECTASIS, SCARRING, PATCHY GROUNDGLASS INFILTRATE WITHIN THE RIGHT LOWER LOBE   2. MULTIPLY DILATED LOOPS OF SMALL BOWEL SCATTERED AIR-FLUID LEVELS. RADIOGRAPHIC FINDINGS OF SMALL BOWEL OBSTRUCTION. 3. Underlying thyroid measures CALCULI AT THE INFERIOR POLE THE RIGHT KIDNEY. 4. FREE FLUID IN THE ABDOMEN AND PELVIS AS DESCRIBED ABOVE. All CT scans at this facility use dose modulation, iterative reconstruction, and/or weight based dosing when appropriate to reduce radiation dose to as low as reasonably achievable.             ED BEDSIDE ULTRASOUND:   Performed by ED Physician - none    LABS:  Labs Reviewed   COMPREHENSIVE METABOLIC PANEL - Abnormal; Notable for the following components:       Result Value    Glucose 138 (*)     Total Bilirubin 0.8 (*)     All other components within normal limits   CBC WITH AUTO DIFFERENTIAL - Abnormal; Notable for the following components:    MCHC 32.8 (*)     RDW 15.1 (*)     Neutrophils Absolute 8.8 (*)     All other components within normal limits   URINE RT REFLEX TO CULTURE - Abnormal; Notable for the following components:    Ketones, Urine 15 (*)     Leukocyte Esterase, Urine SMALL (*)     All other components within normal limits   PROTIME-INR - Abnormal; Notable for the following components:    Protime 22.8 (*)     All other components within normal limits   MICROSCOPIC URINALYSIS - Abnormal; Notable for the following components:    WBC, UA 6-9 (*)     RBC, UA 6-10 (*)     All other components within normal limits PROTIME-INR - Abnormal; Notable for the following components:    Protime 19.6 (*)     All other components within normal limits   LIPASE   COVID-19   POCT VENOUS   TYPE AND SCREEN   PREPARE FRESH FROZEN PLASMA       All other labs were within normal range or not returned as of this dictation. EMERGENCY DEPARTMENT COURSE and DIFFERENTIAL DIAGNOSIS/MDM:   Vitals:    Vitals:    09/17/20 1005 09/17/20 1102 09/17/20 1104 09/17/20 1131   BP: 113/62 136/65 136/65 137/65   Pulse: 85 90 90 90   Resp: 18 16 16 16   Temp: 98.8 °F (37.1 °C) 99.4 °F (37.4 °C) 99.4 °F (37.4 °C) 99.5 °F (37.5 °C)   TempSrc: Oral      SpO2: 91%   94%   Weight:       Height:                MDM  Number of Diagnoses or Management Options  Nausea and vomiting, intractability of vomiting not specified, unspecified vomiting type:   SBO (small bowel obstruction) [facility]):   Diagnosis management comments: Discussed with surgeon on-call. Evaluated patient in ER room. Will admit patient will order NG tube. Amount and/or Complexity of Data Reviewed  Clinical lab tests: reviewed and ordered  Tests in the radiology section of CPT®: reviewed and ordered  Discuss the patient with other providers: yes            CONSULTS:  None    PROCEDURES:  Unless otherwise noted below, none     Procedures    FINAL IMPRESSION      1. SBO (small bowel obstruction) ([facility] [address]    2.  Nausea and vomiting, intractability of vomiting not specified, unspecified vomiting type          DISPOSITION/PLAN   DISPOSITION        PATIENT REFERRED TO:  Janee Landon MD  [address] [facility] [address]  [phone]            DISCHARGE MEDICATIONS:  Current Discharge Medication List             (Please note that portions of this note were completed with a voice recognition program.  Efforts were made to edit the dictations but occasionally words are mis-transcribed.)    Manuela Hahn PA-C (electronically signed)  Attending Emergency Physician       Manuela Hahn DANDRE  09/17/20 140    Attending Supervisory Note/Shared Visit   I have personally performed a face to face diagnostic evaluation on this patient. I have reviewed the mid-levels findings and agree.   History and Exam by me shows small bowel obstruction      Lillian Arguello DO  Attending Emergency Physician         Lillian Arguello DO  09/17/20 7865

## 2020-09-18 LAB
ANION GAP SERPL CALCULATED.3IONS-SCNC: 11 MEQ/L (ref 9–15)
BASOPHILS ABSOLUTE: 0 K/UL (ref 0–0.2)
BASOPHILS RELATIVE PERCENT: 0.1 %
BUN BLDV-MCNC: 13 MG/DL (ref 8–23)
CALCIUM SERPL-MCNC: 8.8 MG/DL (ref 8.5–9.9)
CHLORIDE BLD-SCNC: 105 MEQ/L (ref 95–107)
CO2: 25 MEQ/L (ref 20–31)
CREAT SERPL-MCNC: 0.7 MG/DL (ref 0.5–0.9)
EOSINOPHILS ABSOLUTE: 0 K/UL (ref 0–0.7)
EOSINOPHILS RELATIVE PERCENT: 0 %
GFR AFRICAN AMERICAN: >60
GFR NON-AFRICAN AMERICAN: >60
GLUCOSE BLD-MCNC: 164 MG/DL (ref 70–99)
HCT VFR BLD CALC: 38.4 % (ref 37–47)
HEMOGLOBIN: 12.6 G/DL (ref 12–16)
INR BLD: 2.1
LYMPHOCYTES ABSOLUTE: 0.7 K/UL (ref 1–4.8)
LYMPHOCYTES RELATIVE PERCENT: 11 %
MAGNESIUM: 2.1 MG/DL (ref 1.7–2.4)
MCH RBC QN AUTO: 29.7 PG (ref 27–31.3)
MCHC RBC AUTO-ENTMCNC: 32.9 % (ref 33–37)
MCV RBC AUTO: 90.2 FL (ref 82–100)
MONOCYTES ABSOLUTE: 0.4 K/UL (ref 0.2–0.8)
MONOCYTES RELATIVE PERCENT: 5.9 %
NEUTROPHILS ABSOLUTE: 5.5 K/UL (ref 1.4–6.5)
NEUTROPHILS RELATIVE PERCENT: 83 %
PDW BLD-RTO: 15.2 % (ref 11.5–14.5)
PLATELET # BLD: 182 K/UL (ref 130–400)
POTASSIUM REFLEX MAGNESIUM: 3.4 MEQ/L (ref 3.4–4.9)
PROTHROMBIN TIME: 23.2 SEC (ref 12.3–14.9)
RBC # BLD: 4.25 M/UL (ref 4.2–5.4)
SODIUM BLD-SCNC: 141 MEQ/L (ref 135–144)
WBC # BLD: 6.6 K/UL (ref 4.8–10.8)

## 2020-09-18 PROCEDURE — 85025 COMPLETE CBC W/AUTO DIFF WBC: CPT

## 2020-09-18 PROCEDURE — 93010 ELECTROCARDIOGRAM REPORT: CPT | Performed by: INTERNAL MEDICINE

## 2020-09-18 PROCEDURE — 83735 ASSAY OF MAGNESIUM: CPT

## 2020-09-18 PROCEDURE — 85610 PROTHROMBIN TIME: CPT

## 2020-09-18 PROCEDURE — 80048 BASIC METABOLIC PNL TOTAL CA: CPT

## 2020-09-18 PROCEDURE — 99233 SBSQ HOSP IP/OBS HIGH 50: CPT | Performed by: SURGERY

## 2020-09-18 PROCEDURE — 6360000002 HC RX W HCPCS: Performed by: SURGERY

## 2020-09-18 PROCEDURE — 6370000000 HC RX 637 (ALT 250 FOR IP): Performed by: SURGERY

## 2020-09-18 PROCEDURE — 36415 COLL VENOUS BLD VENIPUNCTURE: CPT

## 2020-09-18 PROCEDURE — 97166 OT EVAL MOD COMPLEX 45 MIN: CPT

## 2020-09-18 PROCEDURE — 2580000003 HC RX 258: Performed by: SURGERY

## 2020-09-18 PROCEDURE — 97162 PT EVAL MOD COMPLEX 30 MIN: CPT

## 2020-09-18 PROCEDURE — 1210000000 HC MED SURG R&B

## 2020-09-18 PROCEDURE — 2700000000 HC OXYGEN THERAPY PER DAY

## 2020-09-18 RX ORDER — WARFARIN SODIUM 2.5 MG/1
2.5 TABLET ORAL DAILY
Status: DISCONTINUED | OUTPATIENT
Start: 2020-09-18 | End: 2020-09-19 | Stop reason: HOSPADM

## 2020-09-18 RX ORDER — ACETAMINOPHEN 325 MG/1
650 TABLET ORAL EVERY 6 HOURS PRN
Status: DISCONTINUED | OUTPATIENT
Start: 2020-09-18 | End: 2020-09-19 | Stop reason: HOSPADM

## 2020-09-18 RX ADMIN — CARVEDILOL 25 MG: 12.5 TABLET, FILM COATED ORAL at 17:59

## 2020-09-18 RX ADMIN — ENOXAPARIN SODIUM 30 MG: 30 INJECTION SUBCUTANEOUS at 08:20

## 2020-09-18 RX ADMIN — Medication 10 ML: at 20:33

## 2020-09-18 ASSESSMENT — PAIN SCALES - GENERAL
PAINLEVEL_OUTOF10: 0

## 2020-09-18 NOTE — PLAN OF CARE
Nutrition Problem #1: Inadequate oral intake  Intervention: Food and/or Nutrient Delivery: Start Oral Diet, Start Oral Nutrition Supplement  Nutritional Goals: po diet progression and tolerance, anticipate wt loss as edema resolves

## 2020-09-18 NOTE — PROGRESS NOTES
[facility] OCCUPATIONAL THERAPY EVALUATION - ACUTE     NAME: Liza Padilla  : 1929 (80 y.o.)  MRN: [id number]  CODE STATUS: Full Code  Room: Q507/U804-63    Date of Service: 2020    Patient Diagnosis(es): SBO (small bowel obstruction) [facility]) [R17.885]   Chief Complaint   Patient presents with    Abdominal Pain     pt c/o entire abd pain x 1 day     Patient Active Problem List    Diagnosis Date Noted    [facility]BO [facility] bowel obstruction) ([facility] 2020    Syncope and collapse 10/24/2019    Anemia 01/10/2018    S/P total knee replacement using cement, left 2018    Degenerative joint disease of knee, left 2018    PAF (paroxysmal atrial fibrillation) ([facility] 2017    Anxiety state 2012    HTN (hypertension) 2011    Hyperlipidemia 2011        Past Medical History:   Diagnosis Date    Arthritis     Disease of blood and blood forming organ     DVT (deep vein thrombosis) in pregnancy     History of blood transfusion     Hx of blood clots     Pulmonary    Hyperlipidemia 2011    Hypertension     PONV (postoperative nausea and vomiting)     vomiting    Presence of IVC filter     Pulmonary embolism (HCC)     SBO (small bowel obstruction) ([facility]     Wrist fracture, right      Past Surgical History:   Procedure Laterality Date    APPENDECTOMY      CATARACT REMOVAL Bilateral     CHOLECYSTECTOMY      EYE SURGERY      Phlco bilateral implants    HEMORRHOID SURGERY      HYSTERECTOMY      KNEE SURGERY Right     ND TOTAL KNEE ARTHROPLASTY Left 2018    LT KNEE TOTAL ARTHROPLASTY SUPINE, TANESHA PSI, AM ADMIT, SPINAL performed by Nemo Meyer MD at [facility] Right         Restrictions  Restrictions/Precautions: Fall Risk(Avasys)     Safety Devices: Safety Devices  Safety Devices in place: Yes  Type of devices:  All fall risk precautions in place        Subjective  Pre Treatment Pain Screening  Pain at present: 0  Scale Used: Numeric Score  Intervention List: Patient able to continue with treatment    Pain Reassessment:   Pain Assessment  Patient Currently in Pain: Denies       Prior Level of Function:  Social/Functional History  Lives With: Spouse  Type of Home: House  Home Layout: Able to Live on Main level with bedroom/bathroom, One level, Laundry in basement(12 steps down to basement)  Bathroom Shower/Tub: Tub/Shower unit  Ja [employer]: Grab bars in shower, Shower chair, Grab bars around toilet  Home Equipment: Debkhadij[employer] Mateo, 4 wheeled walker, Rolling walker  Receives Help From: Family  ADL Assistance: Needs assistance(Independent with dressing, daughter assists with bathing)  Homemaking Assistance: (Spouse assists with laundry; Patient states she is able to cook, Daughter assists with cleaning)  Homemaking Responsibilities: Yes(Patient cooks and performs light laundry; daughter cleans)  Ambulation Assistance: Independent(Cane getting in/out of car; rollator in home)  Transfer Assistance: Independent  Active : No  Patient's  Info: Daughter assists with transportation; Patient states she was driving prior to surgery    OBJECTIVE:     Orientation Status:  Orientation  Overall Orientation Status: Within Functional Limits    Observation:  Observation/Palpation  Observation: laying in bed, 1:1, pleasant, coopertive, requires redirection at times    Cognition Status:  Cognition  Overall Cognitive Status: Exceptions  Arousal/Alertness: Appropriate responses to stimuli  Following Commands:  Follows one step commands with repetition  Attention Span: Attends with cues to redirect  Memory: Decreased short term memory  Safety Judgement: Decreased awareness of need for assistance, Decreased awareness of need for safety  Problem Solving: Assistance required to generate solutions, Assistance required to implement solutions, Assistance required to identify errors made, Good awareness of errors made  Insights: Decreased awareness of deficits  Initiation: Requires cues for some  Sequencing: Requires cues for some  Cognition Comment: Patient impulsive throughout evaluation with decreased safety awareness    Perception Status:  Perception  Overall Perceptual Status: WFL    Sensation Status:  Sensation  Overall Sensation Status: WFL    Vision and Hearing Status:  Vision  Vision: Impaired  Vision Exceptions: Wears glasses for reading  Hearing  Hearing: Exceptions to [facility]  Hearing Exceptions: Hard of hearing/hearing concerns     ROM:   LUE AROM (degrees)  LUE AROM : WFL  Left Hand AROM (degrees)  Left Hand AROM: WFL  RUE AROM (degrees)  RUE AROM : WFL  Right Hand AROM (degrees)  Right Hand AROM: WFL    Strength:  LUE Strength  LUE Strength Comment: Gross Assessment: 3+/5  RUE Strength  RUE Strength Comment: Gross Assessment: 3+/5    Coordination, Tone, Quality of Movement: Tone RUE  RUE Tone: Normotonic  Tone LUE  LUE Tone: Normotonic  Coordination  Movements Are Fluid And Coordinated: No  Coordination and Movement description: Fine motor impairments, Gross motor impairments, Right UE, Left UE    Hand Dominance:  Hand Dominance  Hand Dominance: Right    ADL Status:  ADL  Feeding: Setup  Grooming: Setup  UE Bathing: Setup  LE Bathing: Minimal assistance  UE Dressing: Setup  LE Dressing: Minimal assistance  Toileting: Contact guard assistance  Additional Comments:  All ADLs simulated as above  Toilet Transfers  Toilet Transfer: Unable to assess  Toilet Transfers Comments: Not formally assessed, anticipate CGA based on functional observation  Tub Transfers  Tub Transfers: Not tested    Therapy key for assistance levels -   Independent = Pt. is able to perform task with no assistance but may require a device   Stand by assistance = Pt. does not perform task at an independent level but does not need physical assistance, requires verbal cues  Minimal, Moderate, Maximal Assistance = Pt. requires physical assistance (25%, 50%, 75% assist from helper) for task but is able to actively participate in task   Dependent = Pt. requires total assistance with task and is not able to actively participate with task completion     Functional Mobility:  Functional Mobility  Functional - Mobility Device: No device  Activity: Other  Assist Level: Contact guard assistance  Functional Mobility Comments: Patient ambulates from EOB to sink and back. Patient with one LOB with Min A to correct  Transfers  Sit to stand: Contact guard assistance  Stand to sit: Contact guard assistance    Bed Mobility  Bed mobility  Supine to Sit: Stand by assistance  Sit to Supine: Stand by assistance    Seated and Standing Balance:  Balance  Sitting Balance: Stand by assistance  Standing Balance: Contact guard assistance    Functional Endurance:  Activity Tolerance  Activity Tolerance: Patient Tolerated treatment well    D/C Recommendations:  OT D/C RECOMMENDATIONS  REQUIRES OT FOLLOW UP: Yes    Equipment Recommendations:  OT Equipment Recommendations  Other: Continue to assess pending progress    OT Education:   OT Education  OT Education: OT Role, Plan of Care    OT Follow Up:  OT D/C RECOMMENDATIONS  REQUIRES OT FOLLOW UP: Yes       Assessment/Discharge Disposition:  Assessment: Patient is a 80year old female who presents to [facility] from home with spouse with the above deficits. She would benefit from continued occupational therapy to increase safety and participation in ADL and IADL skills.   Performance deficits / Impairments: Decreased functional mobility , Decreased cognition, Decreased high-level IADLs, Decreased ADL status, Decreased endurance, Decreased fine motor control, Decreased coordination, Decreased strength, Decreased balance, Decreased safe awareness  Prognosis: Good  Discharge Recommendations: Continue to assess pending progress  Decision Making: Medium Complexity  History: Multiple comorbidities  Exam: 10 performance deficits  Assistance / Modification: Min A    Six Click Score   How much help for putting on and taking off regular lower body clothing?: A Little  How much help for Bathing?: A Little  How much help for Toileting?: A Little  How much help for putting on and taking off regular upper body clothing?: A Little  How much help for taking care of personal grooming?: A Little  How much help for eating meals?: None  AM-PAC Inpatient Daily Activity Raw Score: 19  AM-PAC Inpatient ADL T-Scale Score : 40.22  ADL Inpatient CMS 0-100% Score: 42.8    Plan:  Plan  Times per week: 1-3x/week  Plan weeks: Length of acute care stay  Current Treatment Recommendations: Strengthening, Safety Education & Training, Balance Training, Patient/Caregiver Education & Training, Self-Care / ADL, Cognitive/Perceptual Training, Functional Mobility Training, Neuromuscular Re-education, Equipment Evaluation, Education, & procurement, Home Management Training, Endurance Training    Goals:   Patient will:    - Improve functional endurance to tolerate/complete 30 mins of ADL's  - Be Independent in UB ADLs   - Be Modified Independent in LB ADLs  - Be Modified Independent in ADL transfers without LOB  - Be Independent in toileting tasks  - Improve bilateral hand fine motor coordination to [facility] in order to manage clothing fasteners/self-care containers in a timely manner  - Improve bilateral UE strength and endurance to 4/5 in order to participate in self-care activities as projected. - Access appropriate D/C site with as few architectural barriers as possible. - Sequence self-care tasks with Supervision for safety awareness  - Other :  improve sitting/standing balance to complete ADLs as projected. Patient Goal: Patient goals :  To return home     Discussed and agreed upon: Yes Comments:     Therapy Time:   OT Individual Minutes  Time In: 1532  Time Out: 1549  Minutes: 17    Eval: 17 minutes     Electronically signed by:    SUBHASH Granados  9/18/2020, 4:03 PM

## 2020-09-18 NOTE — CARE COORDINATION
Team rounds completed. SNF vs return w/[facility]. Pt has wound vac and need plan. IV check completed and discussed with Debbie Gamez at [facility] and out of pocket cost of Vanco and Merrem is $20.00/week. Discussed that family wishes to speak to physician about patient's poc.

## 2020-09-18 NOTE — PROGRESS NOTES
EMERGENCY GENERAL SURGERY DAILY PROGRESS NOTE      Patient Name: Ochoa Mendoaz  Admission Date 2020    Hospital Day: 1  Patient seen and examined on 2020    INTERVAL HISTORY/EVENTS     Background:  Ochoa Mendoza is a 80 y.o. female with a PMHx of DVT/PE with IVC filter, HLD, HTN and PONV who presented to [facility] [place] [facility] on 2020 with recurrent SBO with lower abdominal pain followed by nausea and vomiting x 1 day. She was last here in July with SBO, treated non-operatively. She started having bowel obstructions in March of this year and has had them every couple of months. She was offered surgery last admission in July, but declined. Surgical history of appendectomy, cholecystectomy and hysterectomy. She is on coumadin for DVT. Hospital Course:  2020 to 2020 Presents with abd pain and N/V. Admitted to [facility] for SBO. OR for exploratory laparoscopy w/o evidence of obstruction. 24 Hour Events:  No acute events overnight s/p ex-lap with NGT clamped overnight. Denies episodes of nausea or emesis. NGT removed at bedside. VSS. Labs reviewed and unremarkable. PHYSICAL EXAM     Vitals Average, Min and Max for last 24 hours:  Temp: Temp: 97.7 °F (36.5 °C); Temp  Av.4 °F (36.9 °C)  Min: 97.7 °F (36.5 °C)  Max: 98.7 °F (37.1 °C)  Respirations: Resp  Av  Min: 16  Max: 20  Pulse: Pulse  Av.7  Min: 80  Max: 91  Blood Pressure: Systolic (19ADZ), ABM:015 , Min:130 , JPM:013   ; Diastolic (41FDA), LSW:37, Min:59, Max:80    SpO2: SpO2  Av.7 %  Min: 91 %  Max: 98 %    24hr Intake/Output:     Intake/Output Summary (Last 24 hours) at 2020 1728  Last data filed at 2020 0850  Gross per 24 hour   Intake --   Output 400 ml   Net -400 ml       Vitals: BP (!) 130/59   Pulse 80   Temp 97.7 °F (36.5 °C) (Oral)   Resp 20   Ht 5' 3.5\" (1.613 m)   Wt 192 lb 9.6 oz (87.4 kg)   SpO2 98%   BMI 33.58 kg/m²     Physical Exam:  Constitutional: Sitting upright in bed. Talkative. HEENT: Atraumatic normocephalic. Cardiovascular: Regular rate and rhythm. Pulmonary: Clear to ausculation bilaterally. No wheezing, rhonchi or rales. Abdominal: Soft. Non-distended. Appropriately tender at surgical sites x 3. Surgical incisions c/d/i and without evidence of infection. Musculoskeletal: Good ROM in all extremities. No edema. Neurological: Alert, awake, and orientated x 3. Motor and sensory grossly intact. No focal deficits. GCS of 15. LABORATORY RESULTS (LAST 24 HOURS)     CBC with Differential:    Lab Results   Component Value Date    WBC 6.6 09/18/2020    RBC 4.25 09/18/2020    HGB 12.6 09/18/2020    HCT 38.4 09/18/2020     09/18/2020    MCV 90.2 09/18/2020    MCH 29.7 09/18/2020    MCHC 32.9 09/18/2020    RDW 15.2 09/18/2020    LYMPHOPCT 11.0 09/18/2020    MONOPCT 5.9 09/18/2020    BASOPCT 0.1 09/18/2020    MONOSABS 0.4 09/18/2020    LYMPHSABS 0.7 09/18/2020    EOSABS 0.0 09/18/2020    BASOSABS 0.0 09/18/2020     CMP:    Lab Results   Component Value Date     09/18/2020    K 3.4 09/18/2020     09/18/2020    CO2 25 09/18/2020    BUN 13 09/18/2020    CREATININE 0.70 09/18/2020    GFRAA >60.0 09/18/2020    LABGLOM >60.0 09/18/2020    GLUCOSE 164 09/18/2020    PROT 7.2 09/16/2020    LABALBU 3.7 09/16/2020    CALCIUM 8.8 09/18/2020    BILITOT 0.8 09/16/2020    ALKPHOS 127 09/16/2020    AST 20 09/16/2020    ALT 9 09/16/2020     Magnesium:    Lab Results   Component Value Date    MG 2.1 09/18/2020     PT/INR:    Lab Results   Component Value Date    PROTIME 23.2 09/18/2020    INR 2.1 09/18/2020     PTT:    Lab Results   Component Value Date    APTT 37.8 06/08/2020       IMAGING RESULTS (PERSONALLY REVIEWED)     All admission and follow up imaging reviewed. ASSESSMENT & PLAN     Diagnoses:  1. SBO s/p exploratory laparoscopy w/o evidence of obstruction on 9/17/20.  Now resolved      PMHx: DVT/PE with IVC filter, HLD, HTN and PONV  PSHx: Surgical history of appendectomy, cholecystectomy and hysterectomy. PMHx of DVT on coumadin. Incidental Findings: None      ASSESSMENT/PLAN:  Neurological:   - Continue home Remeron QHS  - Tylenol 650mg q6hrs prn mild/moderate pain     Cardiovascular:   No acute cardiac events during admission  - Continue home norvasc and coreg     Respiratory:   No acute respiratory issues. - Maintain O2 sats > 92%  - Encourage IS hourly     GI/Diet:   SBO s/p exploratory laparoscopy w/o evidence of obstruction on 9/17/20. Now resolved. Tolerated NGT clapped overnight.   - NGT removed at bedside  - Tolerated CLD and advanced to regular diet in evening.  - Continue nutritional supplements as recommended by dietetics. Renal/Electrolytes:   No acute issues  - HLIV  - BMP in am     ID:   No active infection. Remains afebrile, normotensive, and without leukocytosis. - No indication for Abx   - CBC in am     Heme:   PMHx of DVT on coumadin  - Continue home coumadin dosing tomorrow and repeat INR     Endocrine:   No acute issues. MSK:   No acute issues  - Activity: As tolerated with assistance  - PT/OT: Eval and recs as able. Prophylaxis:   - Continue bilateral SCDs and home coumadin. Lines/Tubes/Drains:  - Maintain PIVs  - No indication for odom catheter, monitor for urinary retention    Dispo: Remain on RNF for advancement of diet and dispo planning. Accom Status: General Status      Follow up with trauma in 2 weeks for post-op check      Please call for any questions or concerns.     Rebecca Huang, [address] [facility].  [phone] ([phone]     This patient's plan of care was discussed and made in collaboration with Trauma Attending physician, Alee Philippe MD.

## 2020-09-18 NOTE — PROGRESS NOTES
Verbal permission received from patient to talk with son Valentino Sayers. Patient alert to self place and situation. Patient refusing to pee in bed pan, education provided, pt not receptive to education, patient son updated on patient condition. Attempted to get patient to use bed pan, patient still refusing. Patient bladder scanned for 133 mL, assisted on to bed pan and voided.

## 2020-09-18 NOTE — FLOWSHEET NOTE
Evening assessment complete, VSS, no signs of distress. AVASYS in place for safety. Pt a/o to self, place and time but has poor safety awareness. Bed alarm on, call light in reach. 3 lap sites all c/d/i with surgical glue. NG clamped. Pt denies pain and nausea. Bowel sounds present throughout. Denies needs at this time.  Electronically signed by Mark Noel RN on 9/18/2020 at 12:50 AM

## 2020-09-18 NOTE — PROGRESS NOTES
Comprehensive Nutrition Assessment    Type and Reason for Visit:  Positive Nutrition Screen, Initial    Nutrition Recommendations/Plan:   Continue Clear liquid diet, advance to General diet when okay with surgery  Add Clear oral supplement until po diet progressed and tolerated    Nutrition Assessment:  Pt at nutrition risk due to advanced age, inadequate oral intake. WIll begin Clear ONS until po advanced and tolerated    Malnutrition Assessment:  Malnutrition Status:  No malnutrition    Context:  Acute Illness     Findings of the 6 clinical characteristics of malnutrition:  Energy Intake:  Mild decrease in energy intake (Comment)  Weight Loss:  No significant weight loss     Body Fat Loss:  Unable to assess     Muscle Mass Loss:  Unable to assess    Fluid Accumulation:  No significant fluid accumulation     Strength:  Not Performed    Estimated Daily Nutrient Needs:  Energy (kcal):  7895-0369 kcals ( 15-18 kcal/kg); Weight Used for Energy Requirements:  Current(87 kg)     Protein (g):  62-73 g protein ( 1.2-1.4 g/kg IBW); Weight Used for Protein Requirements:  Ideal(52 kg)        Fluid (ml/day):  ~1300 ( 25 ml/kg IBW); Weight Used for Fluid Requirements:  Ideal(52)      Nutrition Related Findings:  A&Ox2, s/p exploratory lap. due to recurrent bowel obstructions, NG removed and to begin Clears at dinner tonight, Meds noted/labs reviewed, BM 9/17, 1+ generlalized edema per nurssin,      Wounds:  Surgical Wound       Current Nutrition Therapies:    DIET CLEAR LIQUID; Anthropometric Measures:  · Height: 5' 3.5\" (161.3 cm)  · Current Body Weight: 192 lb (87.1 kg)   · Admission Body Weight: 180 lb (81.6 kg)(stated)    · Usual Body Weight: 199 lb (90.3 kg)((10/19)stated, 194# ( 6/20))     · Ideal Body Weight: 118 lbs; % Ideal Body Weight   > 100%  · BMI: 33.5  · BMI Categories: Obese Class 1 (BMI 30.0-34. 9)       Nutrition Diagnosis:   · Inadequate oral intake related to altered GI function as evidenced by NPO

## 2020-09-18 NOTE — PROGRESS NOTES
Physical Therapy Med Surg Initial Assessment  [facility]  [id number]       NAME: Daniela Moralez  : 1929 (80 y.o.)  MRN: [id number]  CODE STATUS: Full Code    Date of Service: 2020    Patient Diagnosis(es): SBO (small bowel obstruction) [facility]) [D81.266]   Chief Complaint   Patient presents with    Abdominal Pain     pt c/o entire abd pain x 1 day     Patient Active Problem List    Diagnosis Date Noted    SBO ([facility] bowel obstruction) ([facility] [address] 2020    Syncope and collapse 10/24/2019    Anemia 01/10/2018    S/P total knee replacement using cement, left 2018    Degenerative joint disease of knee, left 2018    PAF (paroxysmal atrial fibrillation) ([facility] [address] 2017    Anxiety state 2012    HTN (hypertension) 2011    Hyperlipidemia 2011        Past Medical History:   Diagnosis Date    Arthritis     Disease of blood and blood forming organ     DVT (deep vein thrombosis) in pregnancy     History of blood transfusion     Hx of blood clots     Pulmonary    Hyperlipidemia 2011    Hypertension     PONV (postoperative nausea and vomiting)     vomiting    Presence of IVC filter     Pulmonary embolism (HCC)     SBO (small bowel obstruction) ([facility] [address] [facility]     Wrist fracture, right      Past Surgical History:   Procedure Laterality Date    APPENDECTOMY      CATARACT REMOVAL Bilateral     CHOLECYSTECTOMY      EYE SURGERY      Phlco bilateral implants    HEMORRHOID SURGERY      HYSTERECTOMY      KNEE SURGERY Right     VT TOTAL KNEE ARTHROPLASTY Left 2018    LT KNEE TOTAL ARTHROPLASTY SUPINE, TANESHA PSI, AM ADMIT, SPINAL performed by Nathaly Rutledge MD at [address] [place] [address] Right        Chart Reviewed: Yes  Patient assessed for rehabilitation services?: Yes  Family / Caregiver Present: No    Restrictions:  Restrictions/Precautions: Fall Risk(Avasys and 1:1)     SUBJECTIVE: Subjective: Pt denies pain.    Pain  Pre Treatment Pain Screening  Pain at present: 0  Scale Used: Numeric Score    Post Treatment Pain Screening:   Pain Screening  Patient Currently in Pain: No  Pain Assessment  Pain Assessment: 0-10  Pain Level: 0    Prior Level of Function:  Social/Functional History  Lives With: Spouse  Type of Home: House  Home Layout: Able to Live on Main level with bedroom/bathroom, One level, Laundry in basement(12 steps down to basement)  Bathroom Shower/Tub: Tub/Shower unit  Ja [employer]: Grab bars in shower, Shower chair, Grab bars around toilet  Home Equipment: [employer], 4 wheeled walker, Rolling walker  Receives Help From: Family  ADL Assistance: Needs assistance(Independent with dressing, daughter assists with bathing)  Homemaking Assistance: (Spouse assists with laundry;  Patient states she is able to cook, Daughter assists with cleaning)  Homemaking Responsibilities: Yes(Patient cooks and performs light laundry; daughter cleans)  Ambulation Assistance: Independent(Cane getting in/out of car; rollator in home)  Transfer Assistance: Independent  Active : No  Patient's  Info: Daughter assists with transportation; Patient states she was driving prior to surgery    OBJECTIVE:   Vision: Impaired  Vision Exceptions: Wears glasses for reading  Hearing: Exceptions to [facility]  Hearing Exceptions: Hard of hearing/hearing concerns    Cognition:  Orientation Level: Oriented to place, Oriented to time, Oriented to person, Disoriented to situation(with increased time)  Follows Commands: Impaired(impulsive)    Observation/Palpation  Posture: Fair  Observation: laying in bed, 1:1, pleasant, coopertive, requires redirection at times    ROM:  RLE PROM: WFL  LLE PROM: WFL    Strength:  Strength RLE  Strength RLE: WFL  Strength LLE  Strength LLE: WFL    Neuro:  Balance  Posture: Fair  Sitting - Static: Good  Sitting - Dynamic: Good  Standing - Static: Fair;+  Standing - Dynamic: Fair     Motor Control  Gross Motor?: mobility with indep  Long term goal 2: Functional transfers with indep  Long term goal 3: Amb 50ft with AD indep  Long term goal 4: SBA with HEP to improve LE strength and balance    AMPAC (6 CLICK) BASIC MOBILITY  AM-PAC Inpatient Mobility Raw Score : 17   Therapy Time:   Individual   Time In 1532   Time Out 1549   Minutes [address] [place], [place], 09/18/20 at 4:06 PM       Definitions for assistance levels  Independent = pt does not require any physical supervision or assistance from another person for activity completion. Device may be needed.   Stand by assistance = pt requires verbal cues or instructions from another person, close to but not touching, to perform the activity  Minimal assistance= pt performs 75% or more of the activity; assistance is required to complete the activity  Moderate assistance= pt performs 50% of the activity; assistance is required to complete the activity  Maximal assistance = pt performs 25% of the activity; assistance is required to complete the activity  Dependent = pt requires total physical assistance to accomplish the task

## 2020-09-19 VITALS
DIASTOLIC BLOOD PRESSURE: 62 MMHG | WEIGHT: 192.6 LBS | HEIGHT: 64 IN | BODY MASS INDEX: 32.88 KG/M2 | TEMPERATURE: 97.4 F | HEART RATE: 78 BPM | RESPIRATION RATE: 18 BRPM | SYSTOLIC BLOOD PRESSURE: 148 MMHG | OXYGEN SATURATION: 94 %

## 2020-09-19 PROBLEM — K56.609 SBO (SMALL BOWEL OBSTRUCTION) (HCC): Status: RESOLVED | Noted: 2020-06-09 | Resolved: 2020-09-19

## 2020-09-19 LAB
ANION GAP SERPL CALCULATED.3IONS-SCNC: 8 MEQ/L (ref 9–15)
BASOPHILS ABSOLUTE: 0 K/UL (ref 0–0.2)
BASOPHILS RELATIVE PERCENT: 0.4 %
BUN BLDV-MCNC: 15 MG/DL (ref 8–23)
CALCIUM SERPL-MCNC: 8.6 MG/DL (ref 8.5–9.9)
CHLORIDE BLD-SCNC: 102 MEQ/L (ref 95–107)
CO2: 28 MEQ/L (ref 20–31)
CREAT SERPL-MCNC: 0.6 MG/DL (ref 0.5–0.9)
EOSINOPHILS ABSOLUTE: 0 K/UL (ref 0–0.7)
EOSINOPHILS RELATIVE PERCENT: 0.7 %
GFR AFRICAN AMERICAN: >60
GFR NON-AFRICAN AMERICAN: >60
GLUCOSE BLD-MCNC: 83 MG/DL (ref 70–99)
HCT VFR BLD CALC: 35.2 % (ref 37–47)
HEMOGLOBIN: 11.4 G/DL (ref 12–16)
INR BLD: 2.2
LYMPHOCYTES ABSOLUTE: 1.1 K/UL (ref 1–4.8)
LYMPHOCYTES RELATIVE PERCENT: 18.6 %
MAGNESIUM: 2.1 MG/DL (ref 1.7–2.4)
MCH RBC QN AUTO: 29.4 PG (ref 27–31.3)
MCHC RBC AUTO-ENTMCNC: 32.5 % (ref 33–37)
MCV RBC AUTO: 90.6 FL (ref 82–100)
MONOCYTES ABSOLUTE: 0.7 K/UL (ref 0.2–0.8)
MONOCYTES RELATIVE PERCENT: 11.5 %
NEUTROPHILS ABSOLUTE: 4.2 K/UL (ref 1.4–6.5)
NEUTROPHILS RELATIVE PERCENT: 68.8 %
PDW BLD-RTO: 15 % (ref 11.5–14.5)
PLATELET # BLD: 179 K/UL (ref 130–400)
POTASSIUM REFLEX MAGNESIUM: 3.2 MEQ/L (ref 3.4–4.9)
PROTHROMBIN TIME: 24.5 SEC (ref 12.3–14.9)
RBC # BLD: 3.89 M/UL (ref 4.2–5.4)
SODIUM BLD-SCNC: 138 MEQ/L (ref 135–144)
WBC # BLD: 6.1 K/UL (ref 4.8–10.8)

## 2020-09-19 PROCEDURE — 6370000000 HC RX 637 (ALT 250 FOR IP): Performed by: PHYSICIAN ASSISTANT

## 2020-09-19 PROCEDURE — 36415 COLL VENOUS BLD VENIPUNCTURE: CPT

## 2020-09-19 PROCEDURE — 6370000000 HC RX 637 (ALT 250 FOR IP): Performed by: SURGERY

## 2020-09-19 PROCEDURE — 85610 PROTHROMBIN TIME: CPT

## 2020-09-19 PROCEDURE — 85025 COMPLETE CBC W/AUTO DIFF WBC: CPT

## 2020-09-19 PROCEDURE — 99239 HOSP IP/OBS DSCHRG MGMT >30: CPT | Performed by: SURGERY

## 2020-09-19 PROCEDURE — 83735 ASSAY OF MAGNESIUM: CPT

## 2020-09-19 PROCEDURE — 80048 BASIC METABOLIC PNL TOTAL CA: CPT

## 2020-09-19 PROCEDURE — 2580000003 HC RX 258: Performed by: SURGERY

## 2020-09-19 RX ORDER — POTASSIUM CHLORIDE 750 MG/1
10 TABLET, FILM COATED, EXTENDED RELEASE ORAL 2 TIMES DAILY
Status: DISCONTINUED | OUTPATIENT
Start: 2020-09-19 | End: 2020-09-19 | Stop reason: HOSPADM

## 2020-09-19 RX ADMIN — AMLODIPINE BESYLATE 2.5 MG: 2.5 TABLET ORAL at 09:58

## 2020-09-19 RX ADMIN — Medication 10 ML: at 09:05

## 2020-09-19 RX ADMIN — MIRTAZAPINE 30 MG: 30 TABLET, FILM COATED ORAL at 09:58

## 2020-09-19 RX ADMIN — CARVEDILOL 25 MG: 12.5 TABLET, FILM COATED ORAL at 09:58

## 2020-09-19 RX ADMIN — POTASSIUM CHLORIDE 10 MEQ: 750 TABLET, EXTENDED RELEASE ORAL at 15:27

## 2020-09-19 ASSESSMENT — PAIN SCALES - GENERAL
PAINLEVEL_OUTOF10: 0

## 2020-09-19 NOTE — PROGRESS NOTES
Pt. Alert and oriented to person, situation and occasionally place. Disoriented to time. Pt. Currently has an [employer] monitor in place due to pt's prior attempts of trying to get out of bed unassisted and being confused. VSS. Pt. Denies pain or nausea. Denies lightheaded while up and ambulating. Pt. Is up with standby assist and a walker. Pt. Tolerated well, however, requires verbal prompts to take her times as she at times can be unsteady. Assessment complete. On physical assessment, scant amount of blood noted at lower medial lap site. Adhesive bandage applied to all lap sites. Sutures and surgical glue used during procedure. Pt. Denies tenderness. Active BSx4. C/o flatus. Falls precautions in place. Bed alarm on . Pt. Resting well this evening with no complaints. Repositioning self in bed. Will continue to monitor.

## 2020-09-19 NOTE — PROGRESS NOTES
Assessment completed, patient completed her bed bath with assistance, patient is awaiting doctor to come in and discharge her.

## 2020-09-19 NOTE — CARE COORDINATION
This CM spoke with PT and she states patient mobilizes well, but does recommend Home PT. Discussed with patient and freedom of choice given and pt declines. She states \"it will be a waste of time, I have did all that before with my knee replacement\". \"I have all the equipment I need and I will get around with the help of my  when I get home\". Notified ROCKY Woods.

## 2020-09-19 NOTE — PROGRESS NOTES
Spiritual Care Services     Summary of Visit:  PT is disappointed and anxious; because she felt a nurse told her that \"everything is okay,\" and she would be going home and she is wondering what she is still doing in the hospital. \"I have no reason to be here. I am doing really good. \"  I encouraged her to wait for her doctor. In addition, I talked to the nursing station about PT's complaints. Meanwhile we prayed, she was anointed and received Holy Communion. Spiritual Assessment/Intervention/Outcomes:    Encounter Summary  Services provided to[de-identified] Patient  Referral/Consult From[de-identified] [facility]  Support System: Children, Spouse  Place of Restoration: [address] [place] [address] Visiting: Yes  Complexity of Encounter: Moderate  Length of Encounter: 15 minutes  Spiritual Assessment Completed:  Yes  Advance Care Planning: Yes  Routine  Type: Initial     Spiritual/Buddhism  Type: Spiritual support  Assessment: Anxious  Intervention: Active listening, Explored feelings, thoughts, concerns, Explored coping resources, Nurtured hope, Prayer, Anointing, Communion  Outcome: Comfort, Expressed gratitude, Less anxious, less agitated, Encouraged, Receptive, Hopeful  Sacraments  Sacrament of Sick-Anointing: Anointed  Communion: Patient received communion     Advance Directives (For Healthcare)  Pre-existing DNR Comfort Care/DNR Arrest/DNI Order: No  Healthcare Directive: Yes, patient has an advance directive for healthcare treatment  Type of Healthcare Directive: Durable power of  for health care, Living will  Copy in Chart: Yes, copy in chart(No copy of HCPOA in EMR.)  Chart Copy Status [de-identified] Current  Date Reviewed and Current[de-identified] 09/17/20  Healthcare Agent Appointed: Luis Daniel Ball Agent's Name: 2 daughters amd son  If you are unable to speak for yourself, does your Healthcare Agent or Legal Spokesperson know your healthcare wishes?: Yes           Values / Beliefs  Do you have any ethnic, cultural, sacramental, or spiritual Hindu needs you would like us to be aware of while you are in the hospital?: No    Care Plan:        [id number]   Electronically signed by Jose E Sutherland on 9/19/20 at 2:02 PM EDT     To reach a  for emotional and spiritual support, place an [facility] consult request.   If a  is needed immediately, dial 0 and ask to page the on-call .

## 2020-09-19 NOTE — DISCHARGE SUMMARY
[address]  [address]  [place], [address] [place] Ila Baron  MRN: [id number]  YOB: 1929  80 y. o.female      Attending  Ray Rogers MD ?   Date of Admission  9/16/2020 Date of Discharge  9/19/2020      ? DIAGNOSES:  Principal Problem (Resolved):    SBO (small bowel obstruction) (HCC)  Active Problems:    * No active hospital problems. *         PROCEDURES:  9/17/2020: General Surgery by Praveena Riddle MD and assisted by Devi Miller PA-C   1. Exploratory Laparoscopy    DISCHARGE MEDICATIONS:   Seth Dwyer   Home Medication Instructions IYV:[id number]    Printed on:09/19/20 3965   Medication Information                      acetaminophen (TYLENOL) 650 MG extended release tablet  Take 500 mg by mouth every 6-8 hours as needed              amLODIPine (NORVASC) 2.5 MG tablet  Take 1 tablet by mouth daily             carvedilol (COREG) 25 MG tablet  Take 25 mg by mouth 2 times daily              Cholecalciferol 2000 UNITS CAPS  Take 1 capsule by mouth 2 times daily              clonazePAM (KLONOPIN) 0.5 MG tablet  Take 0.5 mg by mouth 2 times daily as needed for Anxiety. losartan (COZAAR) 50 MG tablet  Take 50 mg by mouth             mirtazapine (REMERON) 30 MG tablet  Take 30 mg by mouth              potassium chloride (KLOR-CON M) 10 MEQ extended release tablet  Take 10 mEq by mouth 2 times daily              warfarin (COUMADIN) 7.5 MG tablet  Take 0.5 tablets by mouth Twice a Week                   REASON FOR HOSPITALIZATION:  Ila Baron is a 80 y.o. female with a PMHx of DVT/PE with IVC filter, HLD, HTN and PONV who presented to [facility] EM[facility] [place] ED on 9/16/2020 with recurrent SBO with lower abdominal pain followed by nausea and vomiting x 1 day. She was last here in July with SBO, treated non-operatively. She started having bowel obstructions in March of this year and has had them every couple of months.  She was offered surgery last admission in July, but declined.  Surgical history of appendectomy, cholecystectomy and hysterectomy. Nan Holcomb is on coumadin for DVT. SIGNIFICANT FINDINGS:  Catalog of Injuries:   1. SBO s/p exploratory laparoscopy w/o evidence of obstruction on 9/17/20. Now resolved       PMHx: DVT/PE with IVC filter, HLD, HTN and PONV  PSHx: Surgical history of appendectomy, cholecystectomy and hysterectomy. PMHx of DVT on coumadin.     Incidental Findings: None       HOSPITAL COURSE:  9/16/2020 to 9/17/2020 Presents with abd pain and N/V. Admitted to [facility] for SBO. OR for exploratory laparoscopy w/o evidence of obstruction. 9/18/2020: NGT removed. CLD tolerated in am and advanced to regular diet in evening. 9/19/2020: PT/OT eval and recs for home with home health care, however, patient declining and states she help from her  at home and addiment on declining home health care. Tolerating a regaulr diet and able to have BM this afternoon. At time of discharge, Baylee Payan was tolerating a regular diet, having bowel movements, and had adequate analgesia on oral pain medications. Pt's activity level was as tolerated with stand by assistance. The patient had no signs or symptoms of complications. Patient was determined stable for discharge to: Home with [address] [place] [address], however, patient declining and states she help from her  at home and addiment on declining home health care. The patient was seen and examined on the day of discharge with the following findings:  Constitutional: Sitting upright in bed. Talkative. In good spirits. HEENT: Atraumatic normocephalic. Cardiovascular: Regular rate and rhythm. Pulmonary: Clear to ausculation bilaterally. No wheezing, rhonchi or rales. Abdominal: Soft. Non-distended. Appropriately tender at surgical sites x 3. Surgical incisions c/d/i and without evidence of infection. Musculoskeletal: Good ROM in all extremities. No edema.   Neurological: Alert, awake, and orientated x 3. Motor and sensory grossly intact. No focal deficits. GCS of 15. ANTICIPATED FOLLOW UP:  No future appointments. No discharge procedures on file. Other indicated follow up and instructions for scheduling:  - Follow up with trauma in 2 weeks for post-op check      VTE RISK AT DISCHARGE:  Per trauma program protocol, the patient does not require post-discharge VTE prophylaxis due to: NWB single LE or BLE fractures limiting mobility. --  Meredith Ibarra PA-C    >30 minutes was spent on the discharge of this patient including final examination of the patient, discussion of the hospital stay, instructions for continuing care to all relevant caregivers, preparation of discharge records, prescriptions and referral forms, and clear identification of reasons to return to clinic or to emergency room.

## 2020-09-21 NOTE — PROGRESS NOTES
Physical Therapy  [facility] L[facility][id number]  Physical Therapy Discharge      NAME: Adelina Ugalde    : 1929 (80 y.o.)  MRN: [id number]    Account: [de-identified]  Gender: female      Patient has been discharged from acute care hospital. DC patient from current PT program.      Electronically signed by Lisa Jordan PT on 20 at 1:06 PM EDT

## 2020-10-07 ENCOUNTER — OFFICE VISIT (OUTPATIENT)
Dept: SURGERY | Age: 85
End: 2020-10-07
Payer: MEDICARE

## 2020-10-07 ENCOUNTER — OFFICE VISIT (OUTPATIENT)
Dept: GASTROENTEROLOGY | Age: 85
End: 2020-10-07
Payer: MEDICARE

## 2020-10-07 VITALS
WEIGHT: 183 LBS | BODY MASS INDEX: 31.24 KG/M2 | OXYGEN SATURATION: 96 % | SYSTOLIC BLOOD PRESSURE: 122 MMHG | HEIGHT: 64 IN | DIASTOLIC BLOOD PRESSURE: 64 MMHG | HEART RATE: 76 BPM

## 2020-10-07 VITALS
BODY MASS INDEX: 32.78 KG/M2 | HEART RATE: 77 BPM | TEMPERATURE: 97.2 F | OXYGEN SATURATION: 96 % | WEIGHT: 192 LBS | HEIGHT: 64 IN

## 2020-10-07 PROCEDURE — 99203 OFFICE O/P NEW LOW 30 MIN: CPT | Performed by: INTERNAL MEDICINE

## 2020-10-07 PROCEDURE — 99213 OFFICE O/P EST LOW 20 MIN: CPT | Performed by: SURGERY

## 2020-10-07 NOTE — PATIENT INSTRUCTIONS
- Glue over wounds will come off on their own. - Ok to leave wounds open to air.   - Can follow up with Emergency General Team as needed. You may call [phone] to schedule if needed. - Follow up with Primary Care Provider regarding screening colonoscopy. Return for evaluation if you develop similar symptoms with nausea/vomiting/abdominal pain.

## 2020-10-07 NOTE — PROGRESS NOTES
TRAUMA/EMERGENCY GENERAL SURGERY CLINIC NOTE    Subjective:  Brian Keller is a 80 y.o. female with PMHx of DVT/PE with IVC filter, HLD, HTN and PONV. Pt is POD#20 s/p exploratory laparoscopy with Dr. Peng Chavez for suspected SBO (9/17), which was characterized by nausea/vomiting/abdominal pain. However, there was no evidence of obstruction during surgery, and patient's diet was appropriately advanced with discharge on 9/19. During post-op encounter, patient reports feeling well overall. Patient has been tolerating PO. Currently using Boost supplement. Also passing BMs. Denies abdominal pain, nausea, vomiting, chest pain, SOB. Vitals:    10/07/20 1303   Pulse: 77   Temp: 97.2 °F (36.2 °C)   SpO2: 96%         Physical Exam:  Gen: Alert, conversational, no acute distress, sitting comfortably in office chair  CV: RRR, Normal pulses. Pulm: Non labored respirations, on room air. ABD: Soft, nontender, nondistended. x3 laparoscopy incisions on abdomen covered with dermabond and band-aid. No drainage expressed for any wound. Wounds appear to be healing appropriately w/o surrounding erythema.   Neuro: No focal deficits, Ox3, appropriate  Ext: no edema, warm and dry       Medications:  Current Outpatient Medications on File Prior to Visit   Medication Sig Dispense Refill    warfarin (COUMADIN) 7.5 MG tablet Take 0.5 tablets by mouth Twice a Week 30 tablet 3    amLODIPine (NORVASC) 2.5 MG tablet Take 1 tablet by mouth daily 30 tablet 3    acetaminophen (TYLENOL) 650 MG extended release tablet Take 500 mg by mouth every 6-8 hours as needed       carvedilol (COREG) 25 MG tablet Take 25 mg by mouth 2 times daily       Cholecalciferol 2000 UNITS CAPS Take 1 capsule by mouth 2 times daily       losartan (COZAAR) 50 MG tablet Take 50 mg by mouth      mirtazapine (REMERON) 30 MG tablet Take 30 mg by mouth       potassium chloride (KLOR-CON M) 10 MEQ extended release tablet Take 10 mEq by mouth 2 times daily  clonazePAM (KLONOPIN) 0.5 MG tablet Take 0.5 mg by mouth 2 times daily as needed for Anxiety. No current facility-administered medications on file prior to visit. Labs:  No new labs    Cultures:  No new cultures    Studies:  No new studies      Assessment:  Jalen Rust is a 80 y.o. female with a  PMHx of DVT/PE with IVC filter, HLD, HTN and PONV. Presents for f/u as POD#20 s/p exploratory laparoscopy with Dr. Young Lew for suspected SBO. No evidence of obstruction during surgery. Patient's wounds appear to be healing appropriately without signs of infection. Patient denies any recurring SBO symptoms since her admission on 9/16-9/17/2020. Plan:  - Educated patient on wound care: glue will come off on its own, ok to leave open to air, ok to shower/allow soap and water to run over incisions.  - No further follow up with EGS required. Can follow up as needed. - Advised to call office/present to ED for evaluation if symptoms of SBO return (abdominal pain, N/V). - Patient advised to follow up with PCP for preventative procedures, such as colonoscopy. Patient discussed with attending Trauma Physician, Dr. Francisco Gloria DO. Barry Farias PA-C  Trauma/Critical Care  Emergency General Surgery  [phone] (1V-6M)  [phone]      Teaching Physician Note:  I have personally performed a face to face diagnostic  evaluation on this patient. I have reviewed and agree with the care plan. History and exam by me demonstrates:  Patient is here for f/u from diagnostic laparoscopy for SBO. Since d/c she has no further complaints. Eating and drinking without problems. Denies any nausea or vomiting. On exam abdomen is soft, non tender, non distended.   Incisions CDI    Plan/MDM:  Ok to resume normal activity  F/U with GI as scheduled  No further surgical f/u needed    Shaince Sauce

## 2020-10-07 NOTE — PROGRESS NOTES
Gastroenterology Clinic Visit    Tyron Azevedo  [id number]  Chief Complaint   Patient presents with    New Patient    Nausea    Emesis     HPI: 80 y.o. female presents to the clinic with episodes of recurrent small bowel obstruction. Patient reports having 3 such episodes since June 2020. On 1 of the occasions patient underwent a exploratory laparotomy which revealed no adhesions. No other etiology for the small bowel obstruction was found. Patient accompanied by her daughter in the clinic. The symptoms are intermittent in nature. She denies any overt blood loss. She does have some weight loss over the last few months ~10 to 13 pounds    Previous GI work up/Endoscopic investigations:   Last colonoscopy more than 10 years ago    Review of Systems   All other systems reviewed and are negative.        Past Medical History:   Diagnosis Date    Arthritis     Disease of blood and blood forming organ     DVT (deep vein thrombosis) in pregnancy     History of blood transfusion     Hx of blood clots     Pulmonary    Hyperlipidemia 1/26/2011    Hypertension     PONV (postoperative nausea and vomiting)     vomiting    Presence of IVC filter     Pulmonary embolism (HCC)     SBO (small bowel obstruction) ([address]     Wrist fracture, right      Past Surgical History:   Procedure Laterality Date    APPENDECTOMY      CATARACT REMOVAL Bilateral     CHOLECYSTECTOMY      EYE SURGERY      [employer] bilateral implants    HEMORRHOID SURGERY      HYSTERECTOMY      KNEE SURGERY Right     LAPAROSCOPY N/A 9/17/2020    LAPAROSCOPY EXPLORATORY performed by Lynnette Wood MD at [facility]. Left 1/9/2018    LT KNEE TOTAL ARTHROPLASTY SUPINE, TANESHA PSI, AM ADMIT, SPINAL performed by Clinton Tubbs MD at [facility] Right      Current Outpatient Medications on File Prior to Visit   Medication Sig Dispense Refill    warfarin (COUMADIN) 7.5 MG tablet Take 0.5 tablets by mouth Twice a Week 30 tablet 3    amLODIPine (NORVASC) 2.5 MG tablet Take 1 tablet by mouth daily 30 tablet 3    acetaminophen (TYLENOL) 650 MG extended release tablet Take 500 mg by mouth every 6-8 hours as needed       carvedilol (COREG) 25 MG tablet Take 25 mg by mouth 2 times daily       Cholecalciferol 2000 UNITS CAPS Take 1 capsule by mouth 2 times daily       losartan (COZAAR) 50 MG tablet Take 50 mg by mouth      mirtazapine (REMERON) 30 MG tablet Take 30 mg by mouth       potassium chloride (KLOR-CON M) 10 MEQ extended release tablet Take 10 mEq by mouth 2 times daily       clonazePAM (KLONOPIN) 0.5 MG tablet Take 0.5 mg by mouth 2 times daily as needed for Anxiety. No current facility-administered medications on file prior to visit.       Family History   Problem Relation Age of Onset    Cancer Mother     Cancer Father     Cancer Sister         colon    Cancer Brother         colon    Arthritis Daughter     Arthritis Son      Social History     Socioeconomic History    Marital status:      Spouse name: Not on file    Number of children: Not on file    Years of education: Not on file    Highest education level: Not on file   Occupational History    Not on file   Social Needs    Financial resource strain: Not on file    Food insecurity     Worry: Not on file     Inability: Not on file   [employer] needs     Medical: Not on file     Non-medical: Not on file   Tobacco Use    Smoking status: Never Smoker    Smokeless tobacco: Never Used   Substance and Sexual Activity    Alcohol use: No    Drug use: No    Sexual activity: Not on file   Lifestyle    Physical activity     Days per week: Not on file     Minutes per session: Not on file    Stress: Not on file   Relationships    Social connections     Talks on phone: Not on file     Gets together: Not on file     Attends Sikhism service: Not on file     Active member of club or organization: Not on file     Attends meetings of clubs or organizations: Not on file     Relationship status: Not on file    Intimate partner violence     Fear of current or ex partner: Not on file     Emotionally abused: Not on file     Physically abused: Not on file     Forced sexual activity: Not on file   Other Topics Concern    Not on file   Social History Narrative    Not on file       Blood pressure 122/64, pulse 76, height 5' 3.5\" (1.613 m), weight 183 lb (83 kg), SpO2 96 %. Physical Exam  Constitutional:       General: She is not in acute distress. Appearance: Normal appearance. She is well-developed. Eyes:      General: No scleral icterus. Cardiovascular:      Rate and Rhythm: Normal rate and regular rhythm. Pulmonary:      Effort: Pulmonary effort is normal.      Breath sounds: Normal breath sounds. Abdominal:      General: Bowel sounds are normal. There is no distension. Palpations: Abdomen is soft. There is no mass. Tenderness: There is no abdominal tenderness. There is no guarding or rebound. Musculoskeletal: Normal range of motion. Lymphadenopathy:      Cervical: No cervical adenopathy. Neurological:      Mental Status: She is alert and oriented to person, place, and time. Psychiatric:         Behavior: Behavior normal.         Thought Content: Thought content normal.         Judgment: Judgment normal.       Laboratory, Pathology, Radiology reviewed indetail with relevant important investigations summarized below:  Lab Results   Component Value Date    WBC 6.1 09/19/2020    HGB 11.4 (L) 09/19/2020    HCT 35.2 (L) 09/19/2020    MCV 90.6 09/19/2020     09/19/2020     Lab Results   Component Value Date    ALT 9 09/16/2020    AST 20 09/16/2020    ALKPHOS 127 09/16/2020    BILITOT 0.8 (H) 09/16/2020     Assessment and Plan:  80 y.o. female with history of recurrent episodes of suspected small bowel obstruction. Exploratory laparotomy negative for any etiology to explain recurrent episodes.   No endoscopic evaluation to exclude gastric outlet obstruction. Patient has history of gastritis? PUD  -Proceed with EGD + PUSH enteroscopy to evaluate further  -Detailed discussion regarding importance of chewing food well, eat smaller meals, drink water between meals, improve hydration through the day  -Avoid constipation    Return in about 2 months (around 12/7/2020). Lonnie Olivares MD   Staff Gastroenterologist  [facility]    Please note this report has been partially produced using speech recognition software and may cause contain errors related to thatsystem including grammar, punctuation and spelling as well as words and phrases that may seem inappropriate. If there are questions or concerns please feel free to contact me to clarify.

## 2020-10-21 ENCOUNTER — NURSE ONLY (OUTPATIENT)
Dept: PRIMARY CARE CLINIC | Age: 85
End: 2020-10-21

## 2020-10-22 LAB
SARS-COV-2: NOT DETECTED
SOURCE: NORMAL

## 2020-10-27 ENCOUNTER — ANESTHESIA EVENT (OUTPATIENT)
Dept: ENDOSCOPY | Age: 85
End: 2020-10-27
Payer: MEDICARE

## 2020-10-27 ENCOUNTER — HOSPITAL ENCOUNTER (OUTPATIENT)
Age: 85
Setting detail: OUTPATIENT SURGERY
Discharge: HOME | End: 2020-10-27
Attending: INTERNAL MEDICINE | Admitting: INTERNAL MEDICINE
Payer: MEDICARE

## 2020-10-27 ENCOUNTER — ANCILLARY PROCEDURE (OUTPATIENT)
Dept: ENDOSCOPY | Age: 85
End: 2020-10-27
Attending: INTERNAL MEDICINE
Payer: MEDICARE

## 2020-10-27 ENCOUNTER — ANESTHESIA (OUTPATIENT)
Dept: ENDOSCOPY | Age: 85
End: 2020-10-27
Payer: MEDICARE

## 2020-10-27 VITALS
WEIGHT: 183 LBS | SYSTOLIC BLOOD PRESSURE: 155 MMHG | HEART RATE: 74 BPM | TEMPERATURE: 97.9 F | BODY MASS INDEX: 32.43 KG/M2 | HEIGHT: 63 IN | DIASTOLIC BLOOD PRESSURE: 75 MMHG | OXYGEN SATURATION: 94 % | RESPIRATION RATE: 16 BRPM

## 2020-10-27 VITALS — DIASTOLIC BLOOD PRESSURE: 98 MMHG | OXYGEN SATURATION: 98 % | SYSTOLIC BLOOD PRESSURE: 175 MMHG

## 2020-10-27 PROCEDURE — 2580000003 HC RX 258: Performed by: INTERNAL MEDICINE

## 2020-10-27 PROCEDURE — 6360000002 HC RX W HCPCS: Performed by: NURSE ANESTHETIST, CERTIFIED REGISTERED

## 2020-10-27 PROCEDURE — 3700000000 HC ANESTHESIA ATTENDED CARE: Performed by: INTERNAL MEDICINE

## 2020-10-27 PROCEDURE — 2580000003 HC RX 258

## 2020-10-27 PROCEDURE — 3700000001 HC ADD 15 MINUTES (ANESTHESIA): Performed by: INTERNAL MEDICINE

## 2020-10-27 PROCEDURE — 7100000010 HC PHASE II RECOVERY - FIRST 15 MIN: Performed by: INTERNAL MEDICINE

## 2020-10-27 PROCEDURE — 2709999900 HC NON-CHARGEABLE SUPPLY: Performed by: INTERNAL MEDICINE

## 2020-10-27 PROCEDURE — 2500000003 HC RX 250 WO HCPCS: Performed by: NURSE ANESTHETIST, CERTIFIED REGISTERED

## 2020-10-27 PROCEDURE — 7100000011 HC PHASE II RECOVERY - ADDTL 15 MIN: Performed by: INTERNAL MEDICINE

## 2020-10-27 PROCEDURE — 3609017100 HC EGD: Performed by: INTERNAL MEDICINE

## 2020-10-27 PROCEDURE — 44360 SMALL BOWEL ENDOSCOPY: CPT | Performed by: INTERNAL MEDICINE

## 2020-10-27 RX ORDER — SODIUM CHLORIDE 9 MG/ML
INJECTION, SOLUTION INTRAVENOUS
Status: COMPLETED
Start: 2020-10-27 | End: 2020-10-27

## 2020-10-27 RX ORDER — MAGNESIUM HYDROXIDE 1200 MG/15ML
LIQUID ORAL PRN
Status: DISCONTINUED | OUTPATIENT
Start: 2020-10-27 | End: 2020-10-27 | Stop reason: ALTCHOICE

## 2020-10-27 RX ORDER — LIDOCAINE HYDROCHLORIDE 20 MG/ML
INJECTION, SOLUTION INFILTRATION; PERINEURAL PRN
Status: DISCONTINUED | OUTPATIENT
Start: 2020-10-27 | End: 2020-10-27 | Stop reason: SDUPTHER

## 2020-10-27 RX ORDER — PROPOFOL 10 MG/ML
INJECTION, EMULSION INTRAVENOUS PRN
Status: DISCONTINUED | OUTPATIENT
Start: 2020-10-27 | End: 2020-10-27 | Stop reason: SDUPTHER

## 2020-10-27 RX ORDER — SIMETHICONE 20 MG/.3ML
EMULSION ORAL PRN
Status: DISCONTINUED | OUTPATIENT
Start: 2020-10-27 | End: 2020-10-27 | Stop reason: ALTCHOICE

## 2020-10-27 RX ORDER — 0.9 % SODIUM CHLORIDE 0.9 %
500 INTRAVENOUS SOLUTION INTRAVENOUS ONCE
Status: COMPLETED | OUTPATIENT
Start: 2020-10-27 | End: 2020-10-27

## 2020-10-27 RX ORDER — ONDANSETRON 2 MG/ML
4 INJECTION INTRAMUSCULAR; INTRAVENOUS
Status: DISCONTINUED | OUTPATIENT
Start: 2020-10-27 | End: 2020-10-27 | Stop reason: HOSPADM

## 2020-10-27 RX ADMIN — PROPOFOL 20 MG: 10 INJECTION, EMULSION INTRAVENOUS at 10:13

## 2020-10-27 RX ADMIN — LIDOCAINE HYDROCHLORIDE 40 MG: 20 INJECTION, SOLUTION INFILTRATION; PERINEURAL at 10:05

## 2020-10-27 RX ADMIN — SODIUM CHLORIDE 300 ML: 9 INJECTION, SOLUTION INTRAVENOUS at 10:18

## 2020-10-27 RX ADMIN — SODIUM CHLORIDE 500 ML: 9 INJECTION, SOLUTION INTRAVENOUS at 09:24

## 2020-10-27 RX ADMIN — PROPOFOL 30 MG: 10 INJECTION, EMULSION INTRAVENOUS at 10:05

## 2020-10-27 RX ADMIN — PROPOFOL 20 MG: 10 INJECTION, EMULSION INTRAVENOUS at 10:10

## 2020-10-27 ASSESSMENT — PULMONARY FUNCTION TESTS
PIF_VALUE: 1

## 2020-10-27 ASSESSMENT — PAIN - FUNCTIONAL ASSESSMENT: PAIN_FUNCTIONAL_ASSESSMENT: 0-10

## 2020-10-27 NOTE — H&P
Patient Name: Pepper Waller  : 1929  MRN: [id number]  DATE: 10/27/20      ENDOSCOPY  History and Physical    Procedure:    [] Diagnostic Colonoscopy       [] Screening Colonoscopy  [x] EGD with push enteroscopy      [] ERCP      [] EUS       [] Other    [x] Previous office notes/History and Physical reviewed from the patients chart. Please see EMR for further details of HPI. I have examined the patient's status immediately prior to the procedure and:      Indications/HPI:    []Abdominal Pain   []Cancer- GI/Lung  []Fhx of colon CA  []History of Polyps   []Castros   []Melena  []Abnormal Imaging   []Dysphagia    []Persistent Pneumonia  []Anemia   []Food Impaction  []History of Polyps  []GI Bleed   []Pulmonary nodule/Mass  []Change in bowel habits  []Heartburn/Reflux  []Rectal Bleed (BRBPR)  []Chest Pain - Non Cardiac  []Heme (+) Stool  []Ulcers  []Constipation   []Hemoptysis   []Varices  []Diarrhea   []Hypoxemia  []Nausea/Vomiting   []Screening   []Crohns/Colitis  [x]Other: History of recurrent small bowel obstruction    Anesthesia:   [x] MAC [] Moderate Sedation   [] General   [] None     ROS: 12 pt Review of Symptoms was negative unless mentioned above    Medications:   Prior to Admission medications    Medication Sig Start Date End Date Taking? Authorizing Provider   warfarin (COUMADIN) 7.5 MG tablet Take 0.5 tablets by mouth Twice a Week 20  Yes Harrison Contreras DO   clonazePAM (KLONOPIN) 0.5 MG tablet Take 0.5 mg by mouth 2 times daily as needed for Anxiety.  6/29/20 10/27/20 Yes Historical Provider, MD   amLODIPine (NORVASC) 2.5 MG tablet Take 1 tablet by mouth daily 10/25/19  Yes Stephenie Mc DO   carvedilol (COREG) 25 MG tablet Take 25 mg by mouth 2 times daily  16  Yes Historical Provider, MD   Cholecalciferol 2000 UNITS CAPS Take 1 capsule by mouth 2 times daily  4/3/17  Yes Historical Provider, MD   losartan (COZAAR) 50 MG tablet Take 50 mg by mouth 17  Yes Historical Provider, MD mirtazapine (REMERON) 30 MG tablet Take 30 mg by mouth  10/14/16  Yes Historical Provider, MD   potassium chloride (KLOR-CON M) 10 MEQ extended release tablet Take 10 mEq by mouth 2 times daily  7/21/16  Yes Historical Provider, MD   acetaminophen (TYLENOL) 650 MG extended release tablet Take 500 mg by mouth every 6-8 hours as needed  8/14/12   Historical Provider, MD       Allergies:    Allergies   Allergen Reactions    Celebrex [Celecoxib]      Rash    Cymbalta [Duloxetine Hcl]      nausea    Diltiazem      Unknown    Lisinopril      Unknown    Oxycodone-Acetaminophen Other (See Comments)     Hallucinations      Sulfamethoxazole-Trimethoprim Other (See Comments)     \"almost passed out\"        History of allergic reaction to anesthesia:  No    Past Medical History:  Past Medical History:   Diagnosis Date    Arthritis     Disease of blood and blood forming organ     DVT (deep vein thrombosis) in pregnancy     History of blood transfusion     Hx of blood clots     Pulmonary    Hyperlipidemia 1/26/2011    Hypertension     PONV (postoperative nausea and vomiting)     vomiting    Presence of IVC filter     Pulmonary embolism (HCC)     SBO (small bowel obstruction) (HCC)     Wrist fracture, right        Past Surgical History:  Past Surgical History:   Procedure Laterality Date    APPENDECTOMY      CATARACT REMOVAL Bilateral     CHOLECYSTECTOMY      EYE SURGERY      Phlco bilateral implants    HEMORRHOID SURGERY      HYSTERECTOMY      JOINT REPLACEMENT Bilateral     bilateral knee replacements per patient    KNEE SURGERY Right     LAPAROSCOPY N/A 9/17/2020    LAPAROSCOPY EXPLORATORY performed by Mone Wilkinson MD at [address] [place] [address]. Left 1/9/2018    LT KNEE TOTAL ARTHROPLASTY SUPINE, TANESHA PSI, AM ADMIT, SPINAL performed by Yvonne Somers MD at [facility] Right        Social History:  Social History     Tobacco Use    Smoking status: Never Smoker    Smokeless tobacco: Never Used   Substance Use Topics    Alcohol use: No    Drug use: No       Vital Signs:   Vitals:    10/27/20 0906   BP: (!) 175/81   Pulse: 76   Resp: 18   Temp: 97.9 °F (36.6 °C)   SpO2: 94%        Physical Exam:  Cardiac:  [x]WNL []Comments:  Pulmonary:  [x]WNL   []Comments:   Neuro/Mental Status:  [x]WNL  []Comments:  Abdominal:  [x]WNL    []Comments:  Other:   []WNL  []Comments:    Informed Consent:  The risks and benefits of the procedure have been discussed with either the patient or if they cannot consent, their representative. Assessment:  Patient examined and appropriate for planned sedation and procedure. Plan:  Proceed with planned sedation and procedure as above. The patient was counseled at length about risks of phillip COVID-19 in the perioperative and any recovery window from the procedure. The patient was made aware that phillip COVID-19 may worsen their prognosis for recovery from their procedure and lend to a higher morbidity and-all mortality risk. The patient was given the option of postponing the procedure all material risks, benefits, and alternatives were discussed. The patient does wish to proceed with the procedure at this time.     Lonnie Olivares MD  10:00 AM

## 2020-10-27 NOTE — ANESTHESIA POSTPROCEDURE EVALUATION
Department of Anesthesiology  Postprocedure Note    Patient: Mukesh Chowdhury  MRN: [id number]  YOB: 1929  Date of evaluation: 10/27/2020  Time:  10:24 AM     Procedure Summary     Date:  10/27/20 Room / Location:  [address] [place] [address]    Anesthesia Start:  5938 Anesthesia Stop:  1024    Procedure:  EGD ESOPHAGOGASTRODUODENOSCOPY (N/A ) Diagnosis:  (recurrent intestinal obstruction;  K56.609)    Surgeon:  Paris Allan MD Responsible Provider:  ALISE Hall CRNA    Anesthesia Type:  MAC ASA Status:  3          Anesthesia Type: MAC    Beny Phase I: Beny Score: 10    Beny Phase II:      Last vitals: Reviewed and per EMR flowsheets.        Anesthesia Post Evaluation    Patient location during evaluation: PACU  Patient participation: complete - patient participated  Level of consciousness: awake and alert  Pain score: 0  Airway patency: patent  Nausea & Vomiting: no nausea and no vomiting  Complications: no  Cardiovascular status: blood pressure returned to baseline and hemodynamically stable  Respiratory status: acceptable  Hydration status: euvolemic

## 2020-10-27 NOTE — ANESTHESIA PRE PROCEDURE
Department of Anesthesiology  Preprocedure Note       Name:  Nabil Grimes   Age:  80 y.o.  :  1929                                          MRN:  [id number]         Date:  10/27/2020      Surgeon: Claudine Byrd):  Anais Mccray MD    Procedure: Procedure(s):  EGD ESOPHAGOGASTRODUODENOSCOPY    Medications prior to admission:   Prior to Admission medications    Medication Sig Start Date End Date Taking? Authorizing Provider   warfarin (COUMADIN) 7.5 MG tablet Take 0.5 tablets by mouth Twice a Week 20   Corey Serrato,    clonazePAM (KLONOPIN) 0.5 MG tablet Take 0.5 mg by mouth 2 times daily as needed for Anxiety. 20  Historical Provider, MD   amLODIPine (NORVASC) 2.5 MG tablet Take 1 tablet by mouth daily 10/25/19   Florentino Garcia DO   acetaminophen (TYLENOL) 650 MG extended release tablet Take 500 mg by mouth every 6-8 hours as needed  12   Historical Provider, MD   carvedilol (COREG) 25 MG tablet Take 25 mg by mouth 2 times daily  16   Historical Provider, MD   Cholecalciferol 2000 UNITS CAPS Take 1 capsule by mouth 2 times daily  4/3/17   Historical Provider, MD   losartan (COZAAR) 50 MG tablet Take 50 mg by mouth 17   Historical Provider, MD   mirtazapine (REMERON) 30 MG tablet Take 30 mg by mouth  10/14/16   Historical Provider, MD   potassium chloride (KLOR-CON M) 10 MEQ extended release tablet Take 10 mEq by mouth 2 times daily  16   Historical Provider, MD       Current medications:    Current Facility-Administered Medications   Medication Dose Route Frequency Provider Last Rate Last Dose    ondansetron (ZOFRAN) injection 4 mg  4 mg Intravenous Once PRN Anais Mccray MD           Allergies:     Allergies   Allergen Reactions    Celebrex [Celecoxib]      Rash    Cymbalta [Duloxetine Hcl]      nausea    Diltiazem      Unknown    Lisinopril      Unknown    Oxycodone-Acetaminophen Other (See Comments)     Hallucinations      Sulfamethoxazole-Trimethoprim Other (See Comments)     \"almost passed out\"       Problem List:    Patient Active Problem List   Diagnosis Code    Degenerative joint disease of knee, left M17.12    S/P total knee replacement using cement, left Z96.652    Anxiety state F41.1    HTN (hypertension) I10    Hyperlipidemia E78.5    PAF (paroxysmal atrial fibrillation) (HCC) I48.0    Anemia D64.9    Syncope and collapse R55       Past Medical History:        Diagnosis Date    Arthritis     Disease of blood and blood forming organ     DVT (deep vein thrombosis) in pregnancy     History of blood transfusion     Hx of blood clots     Pulmonary    Hyperlipidemia 1/26/2011    Hypertension     PONV (postoperative nausea and vomiting)     vomiting    Presence of IVC filter     Pulmonary embolism (HCC)     SBO (small bowel obstruction) ([address]     Wrist fracture, right        Past Surgical History:        Procedure Laterality Date    APPENDECTOMY      CATARACT REMOVAL Bilateral     CHOLECYSTECTOMY      EYE SURGERY      Phl[employer] bilateral implants    HEMORRHOID SURGERY      HYSTERECTOMY      KNEE SURGERY Right     LAPAROSCOPY N/A 9/17/2020    LAPAROSCOPY EXPLORATORY performed by Desean Hernandez MD at [facility] Left 1/9/2018    LT KNEE TOTAL ARTHROPLASTY SUPINE, TANESHA PSI, AM ADMIT, SPINAL performed by Lida Garzon MD at [address] [place] [address] WRIST FRACTURE SURGERY Right        Social History:    Social History     Tobacco Use    Smoking status: Never Smoker    Smokeless tobacco: Never Used   Substance Use Topics    Alcohol use: No                                Counseling given: Not Answered      Vital Signs (Current): There were no vitals filed for this visit.                                            BP Readings from Last 3 Encounters:   10/07/20 122/64   09/19/20 (!) 148/62   09/17/20 (!) 142/74       NPO Status:                                                                                 BMI:   Wt Readings from Last 3 Encounters:   10/07/20 183 lb (83 kg)   10/07/20 192 lb (87.1 kg)   09/17/20 192 lb 9.6 oz (87.4 kg)     There is no height or weight on file to calculate BMI.    CBC:   Lab Results   Component Value Date    WBC 6.1 09/19/2020    RBC 3.89 09/19/2020    HGB 11.4 09/19/2020    HCT 35.2 09/19/2020    MCV 90.6 09/19/2020    RDW 15.0 09/19/2020     09/19/2020       CMP:   Lab Results   Component Value Date     09/19/2020    K 3.2 09/19/2020     09/19/2020    CO2 28 09/19/2020    BUN 15 09/19/2020    CREATININE 0.60 09/19/2020    GFRAA >60.0 09/19/2020    LABGLOM >60.0 09/19/2020    GLUCOSE 83 09/19/2020    PROT 7.2 09/16/2020    CALCIUM 8.6 09/19/2020    BILITOT 0.8 09/16/2020    ALKPHOS 127 09/16/2020    AST 20 09/16/2020    ALT 9 09/16/2020       POC Tests: No results for input(s): POCGLU, POCNA, POCK, POCCL, POCBUN, POCHEMO, POCHCT in the last 72 hours. Coags:   Lab Results   Component Value Date    PROTIME 24.5 09/19/2020    INR 2.2 09/19/2020    APTT 37.8 06/08/2020       HCG (If Applicable): No results found for: PREGTESTUR, PREGSERUM, HCG, HCGQUANT     ABGs: No results found for: PHART, PO2ART, UCY1BLF, PFM2EHE, BEART, Z8LRUMPM     Type & Screen (If Applicable):  No results found for: LABABO, LABRH    Drug/Infectious Status (If Applicable):  No results found for: HIV, HEPCAB    COVID-19 Screening (If Applicable):   Lab Results   Component Value Date    COVID19 Not Detected 10/21/2020         Anesthesia Evaluation  Patient summary reviewed and Nursing notes reviewed   history of anesthetic complications: PONV. Airway: Mallampati: II  TM distance: >3 FB   Neck ROM: full  Mouth opening: > = 3 FB Dental:    (+) partials      Pulmonary:Negative Pulmonary ROS and normal exam              Patient did not smoke on day of surgery.                  Cardiovascular:    (+) hypertension:, dysrhythmias: atrial fibrillation, hyperlipidemia      ECG reviewed      Echocardiogram reviewed         Beta Blocker:  Dose within 24 Hrs      ROS comment:   EF 65%  Mild AS     Neuro/Psych:   (+) depression/anxiety             GI/Hepatic/Renal:            ROS comment: Recurrent intestinal blockage. Endo/Other: Negative Endo/Other ROS             Pt had no PAT visit       Abdominal:   (+) obese,     Abdomen: soft. Vascular:   + DVT, PE.        ROS comment: IVC filter. Anesthesia Plan      MAC     ASA 3       Induction: intravenous. Anesthetic plan and risks discussed with patient.         Attending anesthesiologist reviewed and agrees with Pre Eval content            ALISE Maldonado - CRNA   10/27/2020

## 2020-12-07 ENCOUNTER — VIRTUAL VISIT (OUTPATIENT)
Dept: GASTROENTEROLOGY | Age: 85
End: 2020-12-07
Payer: MEDICARE

## 2020-12-07 PROCEDURE — 99213 OFFICE O/P EST LOW 20 MIN: CPT | Performed by: NURSE PRACTITIONER

## 2020-12-07 RX ORDER — CLONAZEPAM 0.5 MG/1
0.5 TABLET ORAL 2 TIMES DAILY PRN
COMMUNITY

## 2020-12-07 NOTE — PROGRESS NOTES
2020    TELEHEALTH EVALUATION -- Audio/Visual (During AIXPH-02 public health emergency)    Due to COVID 19 outbreak, patient's office visit was converted to a virtual visit. Patient was contacted and agreed to proceed with a virtual visit via [employer]. me 15 minutes. The risks and benefits of converting to a virtual visit were discussed in light of the current infectious disease epidemic. Patient also understood that insurance coverage and co-pays are up to their individual insurance plans. HPI:  Kiana Primitivo (:  1929) has requested an audio/video evaluation for the following concern(s):  Patient last seen by Dr. Yazmin Lopez on 10/7/2020 with suspected episodes of SBO. Status post exploratory lap negative for any etiology. Patient scheduled for EGD with push. EGD with push enteroscopy on 10/27/2020 with normal findings. Patient accompanied by her daughter. No GI complaints at this time. She denies abdominal pain, nausea or vomiting. Denies constipation, has increased her fiber intake along with fluids. No diarrhea, melena or hematochezia. No weight loss or loss of appetite. Tolerating p.o. without any complaints. No chest pain, shortness of breath, or palpitations. Review of Systems   All other systems reviewed and are negative. Prior to Visit Medications    Medication Sig Taking? Authorizing Provider   clonazePAM (KLONOPIN) 0.5 MG tablet Take 0.5 mg by mouth 2 times daily as needed.  Yes Historical Provider, MD   warfarin (COUMADIN) 7.5 MG tablet Take 0.5 tablets by mouth Twice a Week Yes Mary Elkins, DO   amLODIPine (NORVASC) 2.5 MG tablet Take 1 tablet by mouth daily Yes Tramaine Alejandra, DO   acetaminophen (TYLENOL) 650 MG extended release tablet Take 500 mg by mouth every 6-8 hours as needed  Yes Historical Provider, MD   carvedilol (COREG) 25 MG tablet Take 25 mg by mouth 2 times daily  Yes Historical Provider, MD   Cholecalciferol 2000 UNITS CAPS Take 1 capsule by mouth 2 times daily  Yes Historical Provider, MD   losartan (COZAAR) 50 MG tablet Take 50 mg by mouth Yes Historical Provider, MD   mirtazapine (REMERON) 30 MG tablet Take 30 mg by mouth  Yes Historical Provider, MD   potassium chloride (KLOR-CON M) 10 MEQ extended release tablet Take 10 mEq by mouth 2 times daily  Yes Historical Provider, MD   clonazePAM (KLONOPIN) 0.5 MG tablet Take 0.5 mg by mouth 2 times daily as needed for Anxiety.   Historical Provider, MD       Social History     Tobacco Use    Smoking status: Never Smoker    Smokeless tobacco: Never Used   Substance Use Topics    Alcohol use: No    Drug use: No        Allergies   Allergen Reactions    Celebrex [Celecoxib]      Rash    Cymbalta [Duloxetine Hcl]      nausea    Diltiazem      Unknown    Lisinopril      Unknown    Oxycodone-Acetaminophen Other (See Comments)     Hallucinations      Sulfamethoxazole-Trimethoprim Other (See Comments)     \"almost passed out\"   ,   Past Medical History:   Diagnosis Date    Arthritis     Disease of blood and blood forming organ     DVT (deep vein thrombosis) in pregnancy     History of blood transfusion     Hx of blood clots     Pulmonary    Hyperlipidemia 1/26/2011    Hypertension     PONV (postoperative nausea and vomiting)     vomiting    Presence of IVC filter     Pulmonary embolism (HCC)     SBO (small bowel obstruction) (HCC)     Wrist fracture, right    ,   Past Surgical History:   Procedure Laterality Date    APPENDECTOMY      CATARACT REMOVAL Bilateral     CHOLECYSTECTOMY      EYE SURGERY      [employer] bilateral implants    HEMORRHOID SURGERY      HYSTERECTOMY      JOINT REPLACEMENT Bilateral     bilateral knee replacements per patient    KNEE SURGERY Right     LAPAROSCOPY N/A 9/17/2020    LAPAROSCOPY EXPLORATORY performed by Axel Arellano MD at [address] [place] [address] Rd Left 1/9/2018    LT KNEE TOTAL ARTHROPLASTY SUPINE, TANESHA PSI, AM ADMIT, SPINAL performed by Melquiades Grant Sara Whitehead MD at [facility] N/A 10/27/2020    EGD ESOPHAGOGASTRODUODENOSCOPY performed by Allison Raphael MD at [address] Right    ,   Social History     Tobacco Use    Smoking status: Never Smoker    Smokeless tobacco: Never Used   Substance Use Topics    Alcohol use: No    Drug use: No   ,   Family History   Problem Relation Age of Onset    Cancer Mother     Colon Cancer Mother     Cancer Father     Cancer Sister         colon    Colon Cancer Sister     Cancer Brother         colon    Colon Cancer Brother     Arthritis Daughter     Arthritis Son      PHYSICAL EXAMINATION:  [x] Alert  [x] Oriented to person/place/time    [x] No apparent distress  [] Toxic appearing    [] Face flushed appearing [] Sclera clear  [] Lips are cyanotic      [x] Breathing appears normal  [] Appears tachypneic      [] Rash on visible skin    [] Cranial Nerves II-XII grossly intact    [] Motor grossly intact in visible upper extremities    [] Motor grossly intact in visible lower extremities    [x] Normal Mood  [] Anxious appearing    [] Depressed appearing  [] Confused appearing      [] Poor short term memory  [] Poor long term memory    [] OTHER:      Due to this being a TeleHealth encounter, evaluation of the following organ systems is limited: Vitals/Constitutional/EENT/Resp/CV/GI//MS/Neuro/Skin/Heme-Lymph-Imm. ASSESSMENT/PLAN:  70-year-old female patient with suspected episodes of small bowel obstruction. Exploratory lap performed without any etiology. Status post EGD with push enteroscopy on 10/27/2020 with normal findings. Currently patient without any GI complaints. No alarming symptoms such as weight loss, loss of appetite or abdominal pain. Recommend she keep stools soft and regular, along with adequate fluid and activity. Return if symptoms worsen or fail to improve.     An  electronic signature was used to authenticate this note.    --Aurora Westbrook, APRN - CNP on 12/7/2020 at 3:24 PM      Pursuant to the emergency declaration under the [address] [place] [address], [facility] waiver authority and the [employer] Act, this Virtual  Visit was conducted, with patient's consent, to reduce the patient's risk of exposure to COVID-19 and provide continuity of care for an established patient. Services were provided through a video synchronous discussion virtually to substitute for in-person clinic visit.

## 2021-05-25 ENCOUNTER — OFFICE VISIT (OUTPATIENT)
Dept: ENT CLINIC | Age: 86
End: 2021-05-25
Payer: MEDICARE

## 2021-05-25 VITALS
WEIGHT: 183 LBS | OXYGEN SATURATION: 98 % | BODY MASS INDEX: 32.43 KG/M2 | HEIGHT: 63 IN | HEART RATE: 78 BPM | DIASTOLIC BLOOD PRESSURE: 76 MMHG | SYSTOLIC BLOOD PRESSURE: 138 MMHG | TEMPERATURE: 97.5 F

## 2021-05-25 DIAGNOSIS — H61.23 BILATERAL IMPACTED CERUMEN: ICD-10-CM

## 2021-05-25 DIAGNOSIS — H90.0 CONDUCTIVE HEARING LOSS, BILATERAL: ICD-10-CM

## 2021-05-25 DIAGNOSIS — H90.3 SENSORINEURAL HEARING LOSS (SNHL) OF BOTH EARS: Primary | ICD-10-CM

## 2021-05-25 PROCEDURE — 99213 OFFICE O/P EST LOW 20 MIN: CPT | Performed by: OTOLARYNGOLOGY

## 2021-05-25 RX ORDER — ZOSTER VACCINE RECOMBINANT, ADJUVANTED 50 MCG/0.5
KIT INTRAMUSCULAR
COMMUNITY
Start: 2021-01-06 | End: 2022-01-06

## 2021-05-25 RX ORDER — MIRTAZAPINE 15 MG/1
TABLET, FILM COATED ORAL
COMMUNITY
Start: 2021-05-17

## 2021-05-25 ASSESSMENT — ENCOUNTER SYMPTOMS
VOICE CHANGE: 0
RHINORRHEA: 0
SORE THROAT: 0
SINUS PAIN: 0
TROUBLE SWALLOWING: 0
FACIAL SWELLING: 0
SINUS PRESSURE: 0

## 2021-05-25 NOTE — PROGRESS NOTES
Subjective:      Patient ID: Afua Koo is a 80 y.o. female who presents today for:  Chief Complaint   Patient presents with    Cerumen Impaction       HPI: 66-year-old white female with a history of diminished hearing acuity came earlier and the wax was removed was sent to Dr Akiko Fritz local audiologist 3 weeks ago and did a comprehensive audiometric evaluation but was not given the official report of the test echo was made to the office of the latter and will be sending it by fax    Past Medical History:   Diagnosis Date    Arthritis     Disease of blood and blood forming organ     DVT (deep vein thrombosis) in pregnancy     History of blood transfusion     Hx of blood clots     Pulmonary    Hyperlipidemia 1/26/2011    Hypertension     PONV (postoperative nausea and vomiting)     vomiting    Presence of IVC filter     Pulmonary embolism ([address]     SBO (small bowel obstruction) ([address]     Wrist fracture, right      Past Surgical History:   Procedure Laterality Date    APPENDECTOMY      CATARACT REMOVAL Bilateral     CHOLECYSTECTOMY      EYE SURGERY      Phl[employer] bilateral implants    HEMORRHOID SURGERY      HYSTERECTOMY      JOINT REPLACEMENT Bilateral     bilateral knee replacements per patient    KNEE SURGERY Right     LAPAROSCOPY N/A 9/17/2020    LAPAROSCOPY EXPLORATORY performed by Ev Hale MD at [facility]. Left 1/9/2018    LT KNEE TOTAL ARTHROPLASTY SUPINE, TANESHA PSI, AM ADMIT, SPINAL performed by Simon Amador MD at [facility] ENDOSCOPY N/A 10/27/2020    EGD ESOPHAGOGASTRODUODENOSCOPY performed by Alvaro Boyce MD at [address] [facility]      Social History     Socioeconomic History    Marital status:      Spouse name: Not on file    Number of children: Not on file    Years of education: Not on file    Highest education level: Not on file   Occupational History    Not on file Tobacco Use    Smoking status: Never Smoker    Smokeless tobacco: Never Used   Vaping Use    Vaping Use: Never used   Substance and Sexual Activity    Alcohol use: No    Drug use: No    Sexual activity: Not on file   Other Topics Concern    Not on file   Social History Narrative    Not on file     Social Determinants of Health     Financial Resource Strain:     Difficulty of Paying Living Expenses:    Food Insecurity:     Worried About Running Out of Food in the Last Year:     [address] in the Last Year:    Transportation Needs:     Lack of Transportation (Medical):  Lack of Transportation (Non-Medical):    Physical Activity:     Days of Exercise per Week:     Minutes of Exercise per Session:    Stress:     Feeling of Stress :    Social Connections:     Frequency of Communication with Friends and Family:     Frequency of Social Gatherings with Friends and Family:     Attends Nondenominational Services:     Active Member of Clubs or Organizations:     Attends Club or Organization Meetings:     Marital Status:    Intimate Partner Violence:     Fear of Current or Ex-Partner:     Emotionally Abused:     Physically Abused:     Sexually Abused:      Family History   Problem Relation Age of Onset    Cancer Mother     Colon Cancer Mother     Cancer Father     Cancer Sister         colon    Colon Cancer Sister     Cancer Brother         colon    Colon Cancer Brother     Arthritis Daughter     Arthritis Son      Allergies   Allergen Reactions    Celebrex [Celecoxib]      Rash    Cymbalta [Duloxetine Hcl]      nausea    Diltiazem      Unknown    Lisinopril      Unknown    Oxycodone-Acetaminophen Other (See Comments)     Hallucinations      Sulfamethoxazole-Trimethoprim Other (See Comments)     \"almost passed out\"         Review of Systems   HENT: Positive for hearing loss and tinnitus.  Negative for congestion, dental problem, drooling, ear discharge, ear pain, facial swelling, mouth sores, nosebleeds, postnasal drip, rhinorrhea, sinus pressure, sinus pain, sneezing, sore throat, trouble swallowing and voice change. All other systems reviewed and are negative.       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

## 2021-08-24 ENCOUNTER — OFFICE VISIT (OUTPATIENT)
Dept: ENT CLINIC | Age: 86
End: 2021-08-24
Payer: MEDICARE

## 2021-08-24 VITALS
HEART RATE: 72 BPM | DIASTOLIC BLOOD PRESSURE: 75 MMHG | WEIGHT: 183 LBS | BODY MASS INDEX: 32.43 KG/M2 | TEMPERATURE: 97.3 F | OXYGEN SATURATION: 96 % | HEIGHT: 63 IN | SYSTOLIC BLOOD PRESSURE: 118 MMHG

## 2021-08-24 DIAGNOSIS — H61.23 BILATERAL IMPACTED CERUMEN: ICD-10-CM

## 2021-08-24 DIAGNOSIS — H90.0 CONDUCTIVE HEARING LOSS, BILATERAL: ICD-10-CM

## 2021-08-24 DIAGNOSIS — H90.3 SENSORINEURAL HEARING LOSS (SNHL) OF BOTH EARS: Primary | ICD-10-CM

## 2021-08-24 PROCEDURE — 99213 OFFICE O/P EST LOW 20 MIN: CPT | Performed by: OTOLARYNGOLOGY

## 2021-08-24 ASSESSMENT — ENCOUNTER SYMPTOMS
SORE THROAT: 0
TROUBLE SWALLOWING: 0
FACIAL SWELLING: 0
VOICE CHANGE: 0
SINUS PRESSURE: 0
SINUS PAIN: 0
RHINORRHEA: 0

## 2021-08-24 NOTE — PROGRESS NOTES
Subjective:      Patient ID: Matthew Herndon is a 80 y.o. female who presents today for:  Chief Complaint   Patient presents with    Other       HPI: 3 months follow-up for excessive human accumulation associated with discomfort and hearing loss    Past Medical History:   Diagnosis Date    Arthritis     Disease of blood and blood forming organ     DVT (deep vein thrombosis) in pregnancy     History of blood transfusion     Hx of blood clots     Pulmonary    Hyperlipidemia 1/26/2011    Hypertension     PONV (postoperative nausea and vomiting)     vomiting    Presence of IVC filter     Pulmonary embolism (HCC)     SBO (small bowel obstruction) (HCC)     Wrist fracture, right      Past Surgical History:   Procedure Laterality Date    APPENDECTOMY      CATARACT REMOVAL Bilateral     CHOLECYSTECTOMY      EYE SURGERY      Phlco bilateral implants    HEMORRHOID SURGERY      HYSTERECTOMY      JOINT REPLACEMENT Bilateral     bilateral knee replacements per patient    KNEE SURGERY Right     LAPAROSCOPY N/A 9/17/2020    LAPAROSCOPY EXPLORATORY performed by Aneta Olivas MD at [address] [facility] Left 1/9/2018    LT KNEE TOTAL ARTHROPLASTY SUPINE, TANESHA PSI, AM ADMIT, SPINAL performed by Elysia Clark MD at [address] ENDOSCOPY N/A 10/27/2020    EGD ESOPHAGOGASTRODUODENOSCOPY performed by Magdalena Barber MD at [facility]      Social History     Socioeconomic History    Marital status:      Spouse name: Not on file    Number of children: Not on file    Years of education: Not on file    Highest education level: Not on file   Occupational History    Not on file   Tobacco Use    Smoking status: Never Smoker    Smokeless tobacco: Never Used   Vaping Use    Vaping Use: Never used   Substance and Sexual Activity    Alcohol use: No    Drug use: No    Sexual activity: Not on file   Other Topics Concern  Not on file   Social History Narrative    Not on file     Social Determinants of Health     Financial Resource Strain:     Difficulty of Paying Living Expenses:    Food Insecurity:     Worried About Running Out of Food in the Last Year:     [address] in the Last Year:    Transportation Needs:     Lack of Transportation (Medical):  Lack of Transportation (Non-Medical):    Physical Activity:     Days of Exercise per Week:     Minutes of Exercise per Session:    Stress:     Feeling of Stress :    Social Connections:     Frequency of Communication with Friends and Family:     Frequency of Social Gatherings with Friends and Family:     Attends [employer] Services:     Active Member of Clubs or Organizations:     Attends Club or Organization Meetings:     Marital Status:    Intimate Partner Violence:     Fear of Current or Ex-Partner:     Emotionally Abused:     Physically Abused:     Sexually Abused:      Family History   Problem Relation Age of Onset    Cancer Mother     Colon Cancer Mother     Cancer Father     Cancer Sister         colon    Colon Cancer Sister     Cancer Brother         colon    Colon Cancer Brother     Arthritis Daughter     Arthritis Son      Allergies   Allergen Reactions    Celebrex [Celecoxib]      Rash    Cymbalta [Duloxetine Hcl]      nausea    Diltiazem      Unknown    Lisinopril      Unknown    Oxycodone-Acetaminophen Other (See Comments)     Hallucinations      Sulfamethoxazole-Trimethoprim Other (See Comments)     \"almost passed out\"         Review of Systems   HENT: Negative for congestion, dental problem, drooling, ear discharge, ear pain, facial swelling, hearing loss (Patient has sensorineural hearing loss is exacerbated by conductive component due to cerumen accumulation in both ears), mouth sores, nosebleeds, postnasal drip, rhinorrhea, sinus pressure, sinus pain, sneezing, sore throat, tinnitus, trouble swallowing and voice change.     All other systems reviewed and are negative. Objective:   /75 (Site: Right Upper Arm, Position: Sitting, Cuff Size: Large Adult)   Pulse 72   Temp 97.3 °F (36.3 °C) (Infrared)   Ht 5' 3\" (1.6 m)   Wt 183 lb (83 kg)   SpO2 96%   BMI 32.42 kg/m²     Physical Exam     Head and neck: Normocephalic no nuchal rigidity no palpable mass or venous engorgement no lymphadenopathy    Ears:  Moderate amount of cerumen in both external auditory canal remove both tympanic membrane are intact no perforation no air-fluid level    Nose no external nasal deformity no significant finding    Mouth and throat no extrinsic lesion noted    Assessment:      Sensorineural hearing loss with conductive component due to excessive cerumen accumulation bilaterally   Plan:        Successful removal of impacted cerumen in both ears utilizing a wire loop and alligator forcep  Return to clinic when necessary    Silverio Doran MD

## 2021-11-16 ENCOUNTER — OFFICE VISIT (OUTPATIENT)
Dept: ENT CLINIC | Age: 86
End: 2021-11-16
Payer: MEDICARE

## 2021-11-16 VITALS
HEIGHT: 63 IN | WEIGHT: 183 LBS | OXYGEN SATURATION: 95 % | BODY MASS INDEX: 32.43 KG/M2 | TEMPERATURE: 97.1 F | HEART RATE: 76 BPM

## 2021-11-16 DIAGNOSIS — H61.23 BILATERAL IMPACTED CERUMEN: Primary | ICD-10-CM

## 2021-11-16 PROCEDURE — 99213 OFFICE O/P EST LOW 20 MIN: CPT | Performed by: OTOLARYNGOLOGY

## 2021-11-16 ASSESSMENT — ENCOUNTER SYMPTOMS
RHINORRHEA: 0
TROUBLE SWALLOWING: 0
SINUS PRESSURE: 0
VOICE CHANGE: 0
SORE THROAT: 0
FACIAL SWELLING: 0
SINUS PAIN: 0

## 2021-11-16 NOTE — PROGRESS NOTES
Subjective:      Patient ID: Aaron Hill is a 80 y.o. female who presents today for:  Chief Complaint   Patient presents with    Other       HPI: Periodic clinic visit for mechanical cleansing of both external auditory canal due to accumulation of impacted cerumen associated with conductive hearing loss    Past Medical History:   Diagnosis Date    Arthritis     Disease of blood and blood forming organ     DVT (deep vein thrombosis) in pregnancy     History of blood transfusion     Hx of blood clots     Pulmonary    Hyperlipidemia 1/26/2011    Hypertension     PONV (postoperative nausea and vomiting)     vomiting    Presence of IVC filter     Pulmonary embolism (HCC)     SBO (small bowel obstruction) (HCC)     Wrist fracture, right      Past Surgical History:   Procedure Laterality Date    APPENDECTOMY      CATARACT REMOVAL Bilateral     CHOLECYSTECTOMY      EYE SURGERY      Phlco bilateral implants    HEMORRHOID SURGERY      HYSTERECTOMY      JOINT REPLACEMENT Bilateral     bilateral knee replacements per patient    KNEE SURGERY Right     LAPAROSCOPY N/A 9/17/2020    LAPAROSCOPY EXPLORATORY performed by Bhupinder Castellon MD at [facility]. Left 1/9/2018    LT KNEE TOTAL ARTHROPLASTY SUPINE, TANESHA PSI, AM ADMIT, SPINAL performed by Kamari Young MD at [facility] ENDOSCOPY N/A 10/27/2020    EGD ESOPHAGOGASTRODUODENOSCOPY performed by Ruperto Lipscomb MD at [facility]      Social History     Socioeconomic History    Marital status:      Spouse name: Not on file    Number of children: Not on file    Years of education: Not on file    Highest education level: Not on file   Occupational History    Not on file   Tobacco Use    Smoking status: Never Smoker    Smokeless tobacco: Never Used   Vaping Use    Vaping Use: Never used   Substance and Sexual Activity    Alcohol use: No    Drug

## 2022-12-29 ENCOUNTER — HOSPITAL ENCOUNTER (EMERGENCY)
Age: 87
Discharge: HOME | End: 2022-12-29
Attending: EMERGENCY MEDICINE
Payer: MEDICARE

## 2022-12-29 ENCOUNTER — APPOINTMENT (OUTPATIENT)
Dept: GENERAL RADIOLOGY | Age: 87
End: 2022-12-29
Payer: MEDICARE

## 2022-12-29 VITALS
HEART RATE: 77 BPM | WEIGHT: 183 LBS | TEMPERATURE: 97.7 F | OXYGEN SATURATION: 97 % | DIASTOLIC BLOOD PRESSURE: 78 MMHG | HEIGHT: 63 IN | BODY MASS INDEX: 32.43 KG/M2 | RESPIRATION RATE: 14 BRPM | SYSTOLIC BLOOD PRESSURE: 132 MMHG

## 2022-12-29 DIAGNOSIS — S90.812A ABRASION OF LEFT FOOT, INITIAL ENCOUNTER: Primary | ICD-10-CM

## 2022-12-29 LAB
ALBUMIN SERPL-MCNC: 3.3 G/DL (ref 3.5–4.6)
ALP BLD-CCNC: 110 U/L (ref 40–130)
ALT SERPL-CCNC: 14 U/L (ref 0–33)
ANION GAP SERPL CALCULATED.3IONS-SCNC: 9 MEQ/L (ref 9–15)
AST SERPL-CCNC: 21 U/L (ref 0–35)
BASOPHILS ABSOLUTE: 0 K/UL (ref 0–0.2)
BASOPHILS RELATIVE PERCENT: 0.6 %
BILIRUB SERPL-MCNC: 0.4 MG/DL (ref 0.2–0.7)
BUN BLDV-MCNC: 12 MG/DL (ref 8–23)
CALCIUM SERPL-MCNC: 8.8 MG/DL (ref 8.5–9.9)
CHLORIDE BLD-SCNC: 102 MEQ/L (ref 95–107)
CO2: 23 MEQ/L (ref 20–31)
CREAT SERPL-MCNC: 1.08 MG/DL (ref 0.5–0.9)
EOSINOPHILS ABSOLUTE: 0.2 K/UL (ref 0–0.7)
EOSINOPHILS RELATIVE PERCENT: 3.7 %
GFR SERPL CREATININE-BSD FRML MDRD: 47.9 ML/MIN/{1.73_M2}
GLOBULIN: 2.6 G/DL (ref 2.3–3.5)
GLUCOSE BLD-MCNC: 97 MG/DL (ref 70–99)
HCT VFR BLD CALC: 34.9 % (ref 37–47)
HEMOGLOBIN: 11.5 G/DL (ref 12–16)
LYMPHOCYTES ABSOLUTE: 1.7 K/UL (ref 1–4.8)
LYMPHOCYTES RELATIVE PERCENT: 29.5 %
MCH RBC QN AUTO: 29.1 PG (ref 27–31.3)
MCHC RBC AUTO-ENTMCNC: 32.9 % (ref 33–37)
MCV RBC AUTO: 88.4 FL (ref 79.4–94.8)
MONOCYTES ABSOLUTE: 0.8 K/UL (ref 0.2–0.8)
MONOCYTES RELATIVE PERCENT: 13.8 %
NEUTROPHILS ABSOLUTE: 3 K/UL (ref 1.4–6.5)
NEUTROPHILS RELATIVE PERCENT: 52.4 %
PDW BLD-RTO: 15.2 % (ref 11.5–14.5)
PLATELET # BLD: 231 K/UL (ref 130–400)
POTASSIUM SERPL-SCNC: 4.3 MEQ/L (ref 3.4–4.9)
PROCALCITONIN: 0.03 NG/ML (ref 0–0.15)
RBC # BLD: 3.95 M/UL (ref 4.2–5.4)
SODIUM BLD-SCNC: 134 MEQ/L (ref 135–144)
TOTAL PROTEIN: 5.9 G/DL (ref 6.3–8)
WBC # BLD: 5.7 K/UL (ref 4.8–10.8)

## 2022-12-29 PROCEDURE — 73630 X-RAY EXAM OF FOOT: CPT

## 2022-12-29 PROCEDURE — 85025 COMPLETE CBC W/AUTO DIFF WBC: CPT

## 2022-12-29 PROCEDURE — 99284 EMERGENCY DEPT VISIT MOD MDM: CPT

## 2022-12-29 PROCEDURE — 84145 PROCALCITONIN (PCT): CPT

## 2022-12-29 PROCEDURE — 80053 COMPREHEN METABOLIC PANEL: CPT

## 2022-12-29 PROCEDURE — 87040 BLOOD CULTURE FOR BACTERIA: CPT

## 2022-12-29 PROCEDURE — 36415 COLL VENOUS BLD VENIPUNCTURE: CPT

## 2022-12-29 ASSESSMENT — ENCOUNTER SYMPTOMS
SHORTNESS OF BREATH: 0
VOMITING: 0
ABDOMINAL PAIN: 0
SORE THROAT: 0
EYE DISCHARGE: 0
PHOTOPHOBIA: 0
ABDOMINAL DISTENTION: 0
CHEST TIGHTNESS: 0
WHEEZING: 0
COUGH: 0

## 2022-12-29 ASSESSMENT — PAIN - FUNCTIONAL ASSESSMENT: PAIN_FUNCTIONAL_ASSESSMENT: NONE - DENIES PAIN

## 2022-12-29 NOTE — ED PROVIDER NOTES
[address] ED  eMERGENCY dEPARTMENT eNCOUnter      Pt Name: Mark English  MRN: [id number]  [place] 11/27/1929  Date of evaluation: 12/29/2022  Provider: Caryn Mcneil MD    CHIEF COMPLAINT       Chief Complaint   Patient presents with    Wound Infection     Left foot cellulitis. Pt saw urgent care 3 days ago and states antibiotics are not working. HISTORY OF PRESENT ILLNESS   (Location/Symptom, Timing/Onset,Context/Setting, Quality, Duration, Modifying Factors, Severity)  Note limiting factors. Mark English is a 80 y.o. female who presents to the emergency department valuation of left foot redness. Patient lives with her daughter and the daughter noticed redness to the left foot 3 days ago. Took her to an urgent care which placed her on antibiotics and there is essentially no change. No related fever or chills. No chest pain or shortness of breath. Patient has no complaints and not even any foot pain. HPI    NursingNotes were reviewed. REVIEW OF SYSTEMS    (2-9 systems for level 4, 10 or more for level 5)     Review of Systems   Constitutional:  Negative for chills and diaphoresis. HENT:  Negative for congestion, ear pain, mouth sores and sore throat. Eyes:  Negative for photophobia and discharge. Respiratory:  Negative for cough, chest tightness, shortness of breath and wheezing. Cardiovascular:  Negative for chest pain and palpitations. Gastrointestinal:  Negative for abdominal distention, abdominal pain and vomiting. Endocrine: Negative for cold intolerance. Genitourinary:  Negative for difficulty urinating. Musculoskeletal:  Positive for gait problem. Negative for arthralgias. Skin:  Positive for wound. Negative for pallor and rash. Allergic/Immunologic: Negative for immunocompromised state. Neurological:  Negative for dizziness and syncope. Hematological:  Negative for adenopathy.    Psychiatric/Behavioral:  Negative for agitation and hallucinations. All other systems reviewed and are negative. Except as noted above the remainder of the review of systems was reviewed and negative. PAST MEDICAL HISTORY     Past Medical History:   Diagnosis Date    Arthritis     Disease of blood and blood forming organ     DVT (deep vein thrombosis) in pregnancy     History of blood transfusion     Hx of blood clots     Pulmonary    Hyperlipidemia 1/26/2011    Hypertension     PONV (postoperative nausea and vomiting)     vomiting    Presence of IVC filter     Pulmonary embolism (HCC)     SBO (small bowel obstruction) (HCC)     Wrist fracture, right          SURGICALHISTORY       Past Surgical History:   Procedure Laterality Date    APPENDECTOMY      CATARACT REMOVAL Bilateral     CHOLECYSTECTOMY      EYE SURGERY      Phlco bilateral implants    HEMORRHOID SURGERY      HYSTERECTOMY (CERVIX STATUS UNKNOWN)      JOINT REPLACEMENT Bilateral     bilateral knee replacements per patient    KNEE SURGERY Right     LAPAROSCOPY N/A 9/17/2020    LAPAROSCOPY EXPLORATORY performed by Radha Doran MD at [address] [place] [address] Left 1/9/2018    LT KNEE TOTAL ARTHROPLASTY SUPINE, TANESHA PSI, AM ADMIT, SPINAL performed by Clarence Mast MD at [address] [place] [address] 10/27/2020    EGD ESOPHAGOGASTRODUODENOSCOPY performed by Ernie Marcelino MD at [address] [place] [address] Drive Right          CURRENT MEDICATIONS       Previous Medications    ACETAMINOPHEN (TYLENOL) 650 MG EXTENDED RELEASE TABLET    Take 500 mg by mouth every 6-8 hours as needed     AMLODIPINE (NORVASC) 2.5 MG TABLET    Take 1 tablet by mouth daily    CARVEDILOL (COREG) 25 MG TABLET    Take 25 mg by mouth 2 times daily     CHOLECALCIFEROL 2000 UNITS CAPS    Take 1 capsule by mouth 2 times daily     CLONAZEPAM (KLONOPIN) 0.5 MG TABLET    Take 0.5 mg by mouth 2 times daily as needed for Anxiety.     CLONAZEPAM (KLONOPIN) 0.5 MG TABLET    Take 0.5 mg by mouth 2 times daily as needed. LOSARTAN (COZAAR) 50 MG TABLET    Take 50 mg by mouth    MIRTAZAPINE (REMERON) 30 MG TABLET    Take 30 mg by mouth     POTASSIUM CHLORIDE (KLOR-CON M) 10 MEQ EXTENDED RELEASE TABLET    Take 10 mEq by mouth 2 times daily     REMERON 15 MG TABLET    Take 1 tablet by mouth daily at bedtime. WARFARIN (COUMADIN) 7.5 MG TABLET    Take 0.5 tablets by mouth Twice a Week       ALLERGIES     Celebrex [celecoxib], Cymbalta [duloxetine hcl], Diltiazem, Lisinopril, Oxycodone-acetaminophen, and Sulfamethoxazole-trimethoprim    FAMILY HISTORY       Family History   Problem Relation Age of Onset    Cancer Mother     Colon Cancer Mother     Cancer Father     Cancer Sister         colon    Colon Cancer Sister     Cancer Brother         colon    Colon Cancer Brother     Arthritis Daughter     Arthritis Son           SOCIAL HISTORY       Social History     Socioeconomic History    Marital status:      Spouse name: None    Number of children: None    Years of education: None    Highest education level: None   Tobacco Use    Smoking status: Never    Smokeless tobacco: Never   Vaping Use    Vaping Use: Never used   Substance and Sexual Activity    Alcohol use: No    Drug use: No       SCREENINGS      @FLOW([id number])@      PHYSICAL EXAM    (up to 7 for level 4, 8 or more for level 5)     ED Triage Vitals   BP Temp Temp src Heart Rate Resp SpO2 Height Weight   12/29/22 1308 12/29/22 1256 -- 12/29/22 1256 12/29/22 1256 12/29/22 1256 12/29/22 1256 12/29/22 1256   134/67 97.7 °F (36.5 °C)  77 17 99 % 5' 3\" (1.6 m) 183 lb (83 kg)       Physical Exam  Vitals and nursing note reviewed. Constitutional:       Appearance: She is well-developed. HENT:      Head: Normocephalic. Nose: Nose normal.   Eyes:      Conjunctiva/sclera: Conjunctivae normal.      Pupils: Pupils are equal, round, and reactive to light. Cardiovascular:      Rate and Rhythm: Normal rate and regular rhythm.       Heart sounds: Normal heart sounds. Pulmonary:      Effort: Pulmonary effort is normal.      Breath sounds: Normal breath sounds. Abdominal:      General: Bowel sounds are normal.      Palpations: Abdomen is soft. Tenderness: There is no abdominal tenderness. There is no guarding. Musculoskeletal:         General: Normal range of motion. Cervical back: Normal range of motion and neck supple. Legs:         Feet:       Comments: There are 3 or 4 dorsal abrasions likely from bad footwear. She then also has an area of redness that appears to be more of a dermatitis than cellulitis. No streaking. Skin:     General: Skin is warm and dry. Capillary Refill: Capillary refill takes less than 2 seconds. Neurological:      General: No focal deficit present. Mental Status: She is alert and oriented to person, place, and time.    Psychiatric:         Mood and Affect: Mood normal.       DIAGNOSTIC RESULTS     EKG: All EKG's are interpreted by the Emergency Department Physician who either signs or Co-signsthis chart in the absence of a cardiologist.      RADIOLOGY:   Nirav Spiro such as CT, Ultrasound and MRI are read by the radiologist. Plain radiographic images are visualized and preliminarily interpreted by the emergency physician with the below findings:      Interpretation per the Radiologist below, if available at the time ofthis note:    XR FOOT LEFT (MIN 3 VIEWS)    (Results Pending)         ED BEDSIDE ULTRASOUND:   Performed by ED Physician - none    LABS:  Labs Reviewed   CBC WITH AUTO DIFFERENTIAL - Abnormal; Notable for the following components:       Result Value    RBC 3.95 (*)     Hemoglobin 11.5 (*)     Hematocrit 34.9 (*)     MCHC 32.9 (*)     RDW 15.2 (*)     All other components within normal limits   COMPREHENSIVE METABOLIC PANEL - Abnormal; Notable for the following components:    Sodium 134 (*)     Creatinine 1.08 (*)     Est, Glom Filt Rate 47.9 (*)     Total Protein 5.9 (*) Albumin 3.3 (*)     All other components within normal limits   CULTURE, BLOOD 1   CULTURE, BLOOD 1   PROCALCITONIN       All other labs were within normal range or not returned as of this dictation. EMERGENCY DEPARTMENT COURSE and DIFFERENTIAL DIAGNOSIS/MDM:   Vitals:    Vitals:    12/29/22 1256 12/29/22 1308 12/29/22 1330 12/29/22 1409   BP:  134/67 139/65 132/78   Pulse: 77  71 77   Resp: 17 17 22 14   Temp: 97.7 °F (36.5 °C)      SpO2: 99% 100% 100% 97%   Weight: 183 lb (83 kg)      Height: 5' 3\" (1.6 m)           MDM  Further history obtained from the daughter now at bedside. Apparently 3 days ago the patient had stumbled out of bed and unable to turn the lights on and she spent approximately 8 hours crawling around the house trying to get back into bed. She was found in the morning and helped into bed noted rug burns all over the top of her left foot. That is why the area does not appear to be cellulitic but rather traumatized and causing the soft tissue swelling with petechial-like bruising. Procalcitonin and CBC are normal here. She is discharged home improved . They will continue local wound care and complete the antibiotic      CONSULTS:  None    PROCEDURES:  Unless otherwise noted below, none     Procedures    FINAL IMPRESSION      1.  Abrasion of left foot, initial encounter          DISPOSITION/PLAN   DISPOSITION Decision To Discharge 12/29/2022 02:32:57 PM      PATIENT REFERRED TO:  Sae Green MD  [address] [facility] [address]  [phone]    In 4 days      DISCHARGE MEDICATIONS:  New Prescriptions    No medications on file          (Please note that portions of this note were completed with a voice recognition program.  Efforts were made to edit the dictations but occasionally words are mis-transcribed.)    Brennen Quiñonez MD (electronically signed)  Attending Emergency Physician          Brennen Quiñonez MD  12/29/22 [id number]

## 2022-12-29 NOTE — ED NOTES
Pt's daughter brought in antibiotic bottle. Pt has been taking cephalexin since 12/26/22.      Chuy Low RN  12/29/22 [id number]

## 2022-12-29 NOTE — ED NOTES
Pt presents in ED with left leg cellulitis. Pt states going to urgent care 3 days ago and has since took antibiotics. Pt states they did not work and infection is \"getting worse\". Foot appears red and edematous. Pt is A&Ox4. Afebrile with skin warm and dry. Respirations equal and unlabored.       Price Duggan RN  12/29/22 [id number]

## 2022-12-30 LAB
BLOOD CULTURE, ROUTINE: NORMAL
BLOOD CULTURE, ROUTINE: NORMAL

## 2023-04-21 ENCOUNTER — HOSPITAL ENCOUNTER (OUTPATIENT)
Dept: DATA CONVERSION | Facility: HOSPITAL | Age: 88
End: 2023-04-21
Attending: OTOLARYNGOLOGY | Admitting: OTOLARYNGOLOGY

## 2023-04-21 DIAGNOSIS — I48.20 CHRONIC ATRIAL FIBRILLATION, UNSPECIFIED (MULTI): ICD-10-CM

## 2023-04-21 DIAGNOSIS — S00.432A CONTUSION OF LEFT EAR, INITIAL ENCOUNTER: ICD-10-CM

## 2023-04-21 DIAGNOSIS — Z86.718 PERSONAL HISTORY OF OTHER VENOUS THROMBOSIS AND EMBOLISM: ICD-10-CM

## 2023-04-21 DIAGNOSIS — H92.22 OTORRHAGIA, LEFT EAR: ICD-10-CM

## 2023-04-21 DIAGNOSIS — I10 ESSENTIAL (PRIMARY) HYPERTENSION: ICD-10-CM

## 2023-04-21 DIAGNOSIS — Z90.49 ACQUIRED ABSENCE OF OTHER SPECIFIED PARTS OF DIGESTIVE TRACT: ICD-10-CM

## 2023-04-21 DIAGNOSIS — D64.9 ANEMIA, UNSPECIFIED: ICD-10-CM

## 2023-04-21 DIAGNOSIS — H60.322: ICD-10-CM

## 2023-04-21 DIAGNOSIS — Z79.01 LONG TERM (CURRENT) USE OF ANTICOAGULANTS: ICD-10-CM

## 2023-04-21 DIAGNOSIS — Z95.828 PRESENCE OF OTHER VASCULAR IMPLANTS AND GRAFTS: ICD-10-CM

## 2023-04-21 LAB
ACTIVATED PARTIAL THROMBOPLASTIN TIME IN PPP BY COAGULATION ASSAY: 34 SEC (ref 26–39)
INR IN PPP BY COAGULATION ASSAY: 1.4 (ref 0.9–1.1)
PROTHROMBIN TIME (PT) IN PPP BY COAGULATION ASSAY: 16.4 SEC (ref 9.8–13.4)

## 2023-09-07 VITALS
DIASTOLIC BLOOD PRESSURE: 74 MMHG | WEIGHT: 184.53 LBS | TEMPERATURE: 97.2 F | HEART RATE: 70 BPM | SYSTOLIC BLOOD PRESSURE: 160 MMHG | RESPIRATION RATE: 16 BRPM | BODY MASS INDEX: 31.5 KG/M2 | HEIGHT: 64 IN

## 2023-09-14 NOTE — H&P
History & Physical Reviewed:   I have reviewed the History and Physical dated:  21-Apr-2023   History and Physical reviewed and relevant findings noted. Patient examined to review pertinent physical  findings.: No significant changes   Home Medications Reviewed: no changes noted   Allergies Reviewed: no changes noted       ERAS (Enhanced Recovery After Surgery):  ·  ERAS Patient: no     Consent:   COVID-19 Consent:  ·  COVID-19 Risk Consent Surgeon has reviewed key risks related to the risk of katheryn COVID-19 and if they contract COVID-19 what the risks are.       Electronic Signatures:  Fabio Dyer)  (Signed 21-Apr-2023 06:36)   Authored: History & Physical Reviewed, ERAS, Consent,  Note Completion      Last Updated: 21-Apr-2023 06:36 by Fabio Dyer)

## 2023-09-14 NOTE — H&P
History of Present Illness:   History Present Illness:  Reason for surgery: left ear bleeding.   HPI:    89year old female was seen in office for removal of hearind aid part as foreign body.After removal pt had bleeding from left ear.PATIENT RECOMMENDED REMOVAL OF HEMATOMA  LEFT EAR.    Allergies:        Allergies:  ·  Celebrex : Rash  ·  Accuretic : Cough  ·  Prinzide : Cough  ·  diltiazem : Unknown  ·  Bactrim : Unknown  ·  spironolactone : Unknown       Intolerances:  ·  Effexor : Nausea/Vomiting  ·  oxycodone : Confusion, Mood Alteration  ·  Protonix : Nausea/Vomiting, Headaches  ·  Voltaren : Abdominal pain, Diarrhea  ·  Cymbalta : Nausea/Vomiting  ·  tramadol : Hallucinations  ·  ranitidine : Headaches    Home Medication Review:   Home Medications Reviewed: yes     Impression/Procedure:   ·  Impression and Planned Procedure: Left ear bleeding and hematoma.  Examination under anesthesia and removal of hematoma left ear.       ERAS (Enhanced Recovery After Surgery):  ·  ERAS Patient: no       Vital Signs:  Temperature C: 36.2 degrees C   Temperature F: 97.1 degrees F   Heart Rate: 70 beats per minute   Respiratory Rate: 16 breath per minute   Blood Pressure Systolic: 160 mm/Hg   Blood Pressure Diastolic: 74 mm/Hg     Physical Exam by System:    Constitutional: Well developed, awake/alert/oriented  x3, no distress, alert and cooperative   Eyes: PERRL, EOMI, clear sclera   ENMT: mucous membranes moist, no apparent injury,  no lesions seen.Left ear hematoma.   Head/Neck: Neck supple, no apparent injury, thyroid  without mass or tenderness, No JVD, trachea midline, no bruits   Respiratory/Thorax: Patent airways, CTAB, normal  breath sounds with good chest expansion, thorax symmetric   Cardiovascular: Regular, rate and rhythm, no murmurs,  2+ equal pulses of the extremities, normal S 1and S 2   Skin: Warm and dry, no lesions, no rashes     Consent:   COVID-19 Consent:  ·  COVID-19 Risk Consent Surgeon has  reviewed key risks related to the risk of katheryn COVID-19 and if they contract COVID-19 what the risks are.       Electronic Signatures:  Fabio Dyer)  (Signed 21-Apr-2023 06:35)   Authored: History of Present Illness, Allergies, Home  Medication Review, Impression/Procedure, ERAS, Physical Exam, Consent, Note Completion      Last Updated: 21-Apr-2023 06:35 by Fabio Dyer)

## 2023-10-02 NOTE — OP NOTE
Post Operative Note:     PreOp Diagnosis: Bleeding left ear and hematoma left  ear   Post-Procedure Diagnosis: Left ear canal and hematoma  left ear canal.   Procedure: 1 examination under anesthesia with microscope  2.  Rule of hematoma and control of bleeding left ear  3.   4.   5.   Surgeon: Fabio Dyer MD   Resident/Fellow/Other Assistant: None   Estimated Blood Loss (mL): Minimal   Specimen: no   Findings: Willard and bleeding left external auditory  canal posterior superior portion     Operative Report Dictated:  Dictation: not applicable - note contains Operative  Report   Operative Report:    Patient was brought to the operating room and placed in supine position on the operating table.  A huddle and timeout were completed at appropriate times before and  after anesthesia.  Patient was given general anesthesia and LMA was used.    The usual draping was performed and left ear was exposed draped in usual sterile fashion.  The left ear canal showed an hematoma.  The operating microscope was used at 250 mm lens and 10 magnification and other magnifications.  Hematoma was punctured  and some blood was removed.  Further bleeding ensued and this was controlled.  This required I did not lean and thrombin packs.  Once the bleeding subsided the procedure was terminated patient was awakened and returned to the recovery room in good condition.      Electronic Signatures:  Fabio Dyer)  (Signed 21-Apr-2023 08:34)   Authored: Post Operative Note, Note Completion      Last Updated: 21-Apr-2023 08:34 by Fabio Dyer)

## 2024-05-14 ENCOUNTER — HOSPITAL ENCOUNTER (EMERGENCY)
Age: 89
Discharge: HOME | End: 2024-05-14
Attending: EMERGENCY MEDICINE
Payer: MEDICARE

## 2024-05-14 ENCOUNTER — APPOINTMENT (OUTPATIENT)
Dept: CT IMAGING | Age: 89
End: 2024-05-14
Payer: MEDICARE

## 2024-05-14 VITALS
WEIGHT: 193 LBS | TEMPERATURE: 98.9 F | BODY MASS INDEX: 32.15 KG/M2 | SYSTOLIC BLOOD PRESSURE: 103 MMHG | HEIGHT: 65 IN | DIASTOLIC BLOOD PRESSURE: 79 MMHG | RESPIRATION RATE: 16 BRPM | OXYGEN SATURATION: 96 % | HEART RATE: 86 BPM
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
## 2024-05-14 DIAGNOSIS — S09.90XA CLOSED HEAD INJURY, INITIAL ENCOUNTER: Primary | ICD-10-CM

## 2024-05-14 DIAGNOSIS — D68.9 COAGULOPATHY (HCC): ICD-10-CM

## 2024-05-14 LAB
ALBUMIN SERPL-MCNC: 3.2 G/DL (ref 3.5–4.6)
ALP SERPL-CCNC: 113 U/L (ref 40–130)
ALT SERPL-CCNC: 7 U/L (ref 0–33)
ANION GAP SERPL CALCULATED.3IONS-SCNC: 11 MEQ/L (ref 9–15)
AST SERPL-CCNC: 19 U/L (ref 0–35)
BASOPHILS # BLD: 0 K/UL (ref 0–0.2)
BASOPHILS NFR BLD: 0.2 %
BILIRUB SERPL-MCNC: 1.3 MG/DL (ref 0.2–0.7)
BUN SERPL-MCNC: 16 MG/DL (ref 8–23)
CALCIUM SERPL-MCNC: 8.7 MG/DL (ref 8.5–9.9)
CHLORIDE SERPL-SCNC: 96 MEQ/L (ref 95–107)
CO2 SERPL-SCNC: 23 MEQ/L (ref 20–31)
CREAT SERPL-MCNC: 0.81 MG/DL (ref 0.5–0.9)
EOSINOPHIL # BLD: 0 K/UL (ref 0–0.7)
EOSINOPHIL NFR BLD: 0.1 %
ERYTHROCYTE [DISTWIDTH] IN BLOOD BY AUTOMATED COUNT: 14.2 % (ref 11.5–14.5)
GLOBULIN SER CALC-MCNC: 2.9 G/DL (ref 2.3–3.5)
GLUCOSE SERPL-MCNC: 153 MG/DL (ref 70–99)
HCT VFR BLD AUTO: 39.2 % (ref 37–47)
HGB BLD-MCNC: 12.7 G/DL (ref 12–16)
INR PPP: 3.1
LYMPHOCYTES # BLD: 1.4 K/UL (ref 1–4.8)
LYMPHOCYTES NFR BLD: 12.2 %
MCH RBC QN AUTO: 29.1 PG (ref 27–31.3)
MCHC RBC AUTO-ENTMCNC: 32.4 % (ref 33–37)
MCV RBC AUTO: 89.7 FL (ref 79.4–94.8)
MONOCYTES # BLD: 1 K/UL (ref 0.2–0.8)
MONOCYTES NFR BLD: 9.1 %
NEUTROPHILS # BLD: 8.7 K/UL (ref 1.4–6.5)
NEUTS SEG NFR BLD: 78 %
PLATELET # BLD AUTO: 250 K/UL (ref 130–400)
POTASSIUM SERPL-SCNC: 4.5 MEQ/L (ref 3.4–4.9)
PROT SERPL-MCNC: 6.1 G/DL (ref 6.3–8)
PROTHROMBIN TIME: 31.4 SEC (ref 12.3–14.9)
RBC # BLD AUTO: 4.37 M/UL (ref 4.2–5.4)
SODIUM SERPL-SCNC: 130 MEQ/L (ref 135–144)
WBC # BLD AUTO: 11.2 K/UL (ref 4.8–10.8)

## 2024-05-14 PROCEDURE — 2580000003 HC RX 258: Performed by: EMERGENCY MEDICINE

## 2024-05-14 PROCEDURE — 85025 COMPLETE CBC W/AUTO DIFF WBC: CPT

## 2024-05-14 PROCEDURE — 80053 COMPREHEN METABOLIC PANEL: CPT

## 2024-05-14 PROCEDURE — 36415 COLL VENOUS BLD VENIPUNCTURE: CPT

## 2024-05-14 PROCEDURE — 99285 EMERGENCY DEPT VISIT HI MDM: CPT

## 2024-05-14 PROCEDURE — 72125 CT NECK SPINE W/O DYE: CPT

## 2024-05-14 PROCEDURE — 96360 HYDRATION IV INFUSION INIT: CPT

## 2024-05-14 PROCEDURE — 70450 CT HEAD/BRAIN W/O DYE: CPT

## 2024-05-14 PROCEDURE — 85610 PROTHROMBIN TIME: CPT

## 2024-05-14 RX ORDER — 0.9 % SODIUM CHLORIDE 0.9 %
500 INTRAVENOUS SOLUTION INTRAVENOUS ONCE
Status: COMPLETED | OUTPATIENT
Start: 2024-05-14 | End: 2024-05-14

## 2024-05-14 RX ADMIN — SODIUM CHLORIDE 500 ML: 9 INJECTION, SOLUTION INTRAVENOUS at 11:25

## 2024-05-14 ASSESSMENT — ENCOUNTER SYMPTOMS
SHORTNESS OF BREATH: 0
WHEEZING: 0
EYE DISCHARGE: 0
SORE THROAT: 0
VOMITING: 1
ABDOMINAL DISTENTION: 0
COUGH: 0
ABDOMINAL PAIN: 0
CHEST TIGHTNESS: 0
DIARRHEA: 1

## 2024-05-14 ASSESSMENT — LIFESTYLE VARIABLES
HOW MANY STANDARD DRINKS CONTAINING ALCOHOL DO YOU HAVE ON A TYPICAL DAY: PATIENT DOES NOT DRINK
HOW OFTEN DO YOU HAVE A DRINK CONTAINING ALCOHOL: NEVER

## 2024-05-14 ASSESSMENT — PAIN - FUNCTIONAL ASSESSMENT: PAIN_FUNCTIONAL_ASSESSMENT: NONE - DENIES PAIN

## 2024-05-14 NOTE — CARE COORDINATION
This nurse spoke to the patient about her home-going needs. She states that she lives with her daughter. She uses a rollator, quad cane, shower chair, grab bars in her bathroom and she has a medic alert button. She denied needing any other durable medical equipment at this time. Her daughter assists her with her adl's as needed. Her daughter states that she will call and make her mom a follow up appointment.

## 2024-05-14 NOTE — ED NOTES
Pt presents to ED due to fall from standing after standing up from toilet. Pt is on blood thinners, pt denies LOC. Pt placed on cardiac monitor and showing NSR at a rate 87. Pt on room air and 93%, denies any SOB or difficulty breathing

## 2024-05-14 NOTE — ED PROVIDER NOTES
Positive for gait problem. Negative for arthralgias.   Skin:  Negative for pallor and rash.   Allergic/Immunologic: Negative for immunocompromised state.   Neurological:  Negative for dizziness and syncope.   Hematological:  Negative for adenopathy.   Psychiatric/Behavioral:  Negative for agitation and hallucinations. The patient is not nervous/anxious.    All other systems reviewed and are negative.      Except as noted above the remainder of the review of systems was reviewed and negative.       PAST MEDICAL HISTORY     Past Medical History:   Diagnosis Date    Arthritis     Disease of blood and blood forming organ     DVT (deep vein thrombosis) in pregnancy     History of blood transfusion     Hx of blood clots     Pulmonary    Hyperlipidemia 1/26/2011    Hypertension     PONV (postoperative nausea and vomiting)     vomiting    Presence of IVC filter     Pulmonary embolism (HCC)     SBO (small bowel obstruction) (HCC)     Wrist fracture, right          SURGICALHISTORY       Past Surgical History:   Procedure Laterality Date    APPENDECTOMY      CATARACT REMOVAL Bilateral     CHOLECYSTECTOMY      EYE SURGERY      Phlco bilateral implants    HEMORRHOID SURGERY      HYSTERECTOMY (CERVIX STATUS UNKNOWN)      JOINT REPLACEMENT Bilateral     bilateral knee replacements per patient    KNEE SURGERY Right     LAPAROSCOPY N/A 9/17/2020    LAPAROSCOPY EXPLORATORY performed by Min Muniz MD at [facility] [place]    [place] TOTAL KNEE ARTHROPLASTY Left 1/9/2018    LT KNEE TOTAL ARTHROPLASTY SUPINE, TANESHA PSI, AM ADMIT, SPINAL performed by Taurus Washington MD at [facility] OR    UPPER GASTROINTESTINAL ENDOSCOPY N/A 10/27/2020    EGD ESOPHAGOGASTRODUODENOSCOPY performed by Hector Peña MD at [facility]    WRIST FRACTURE SURGERY Right          CURRENT MEDICATIONS       Previous Medications    ACETAMINOPHEN (TYLENOL) 650 MG EXTENDED RELEASE TABLET    Take 500 mg by mouth every 6-8 hours as needed     AMLODIPINE (NORVASC) 2.5 MG

## 2024-05-14 NOTE — DISCHARGE INSTR - COC
Continuity of Care Form    Patient Name: Beverly Walker   :  1929  MRN:  [id number]    Admit date:  2024  Discharge date:  ***    Code Status Order: Prior   Advance Directives:     Admitting Physician:  No admitting provider for patient encounter.  PCP: Isidro Hemphill MD    Discharging Nurse: ***  Discharging Hospital Unit/Room#:   Discharging Unit Phone Number: ***    Emergency Contact:   Extended Emergency Contact Information  Primary Emergency Contact: Deborah Huynh   [facility]  Home Phone: [phone]  Work Phone: [phone]  Mobile Phone: [phone]  Relation: Child  Secondary Emergency Contact: Amy Cooley   [facility]  Home Phone: [phone]  Work Phone: [phone]  Mobile Phone: [phone]  Relation: Child    Past Surgical History:  Past Surgical History:   Procedure Laterality Date    APPENDECTOMY      CATARACT REMOVAL Bilateral     CHOLECYSTECTOMY      EYE SURGERY      Phlco bilateral implants    HEMORRHOID SURGERY      HYSTERECTOMY (CERVIX STATUS UNKNOWN)      JOINT REPLACEMENT Bilateral     bilateral knee replacements per patient    KNEE SURGERY Right     LAPAROSCOPY N/A 2020    LAPAROSCOPY EXPLORATORY performed by Min Muniz MD at [facility] [place]    [place] TOTAL KNEE ARTHROPLASTY Left 2018    LT KNEE TOTAL ARTHROPLASTY SUPINE, TANESHA PSI, AM ADMIT, SPINAL performed by Taurus Washington MD at [facility] [place]    UPPER GASTROINTESTINAL ENDOSCOPY N/A 10/27/2020    EGD ESOPHAGOGASTRODUODENOSCOPY performed by Hector Peña MD at [facility]    WRIST FRACTURE SURGERY Right        Immunization History:   Immunization History   Administered Date(s) Administered    COVID-19, PFIZER Bivalent, DO NOT Dilute, (age 12y+), IM, 30 mcg/0.3 mL 2022    COVID-19, PFIZER GRAY top, DO NOT Dilute, (age 12 y+), IM, 30 mcg/0.3 mL 2022    COVID-19, PFIZER PURPLE top, DILUTE for use, (age 12 y+), 30mcg/0.3mL 2021, 2021, 10/08/2021

## (undated) DEVICE — SUTURE SZ 0 27IN 5/8 CIR UR-6  TAPER PT VIOLET ABSRB VICRYL J603H

## (undated) DEVICE — SUTURE VCRL SZ 2-0 L27IN ABSRB UD L26MM SH 1/2 CIR J417H

## (undated) DEVICE — TOTAL TRAY, DB, 100% SILI FOLEY, 16FR 10: Brand: MEDLINE

## (undated) DEVICE — PENCIL SMOKE EVAC PUSH BUTTON COATED

## (undated) DEVICE — BLADE RMR L26MM PAT W/ PILOT H

## (undated) DEVICE — COUNTER NDL 40 COUNT HLD 70 FOAM BLK ADH W/ MAG

## (undated) DEVICE — E-Z CLEAN, NON-STICK, PTFE COATED, ELECTROSURGICAL BLADE ELECTRODE, 6.5 INCH (16.5 CM): Brand: MEGADYNE

## (undated) DEVICE — INTENDED FOR TISSUE SEPARATION, AND OTHER PROCEDURES THAT REQUIRE A SHARP SURGICAL BLADE TO PUNCTURE OR CUT.: Brand: BARD-PARKER ® CARBON RIB-BACK BLADES

## (undated) DEVICE — GOWN,SIRUS,POLYRNF,BRTHSLV,LG,30/CS: Brand: MEDLINE

## (undated) DEVICE — CONMED SCOPE SAVER BITE BLOCK, 20X27 MM: Brand: SCOPE SAVER

## (undated) DEVICE — SPLINT KNEE UNIV FOR LESS THAN 36IN L20IN FOAM LAM E CNTCT

## (undated) DEVICE — Device

## (undated) DEVICE — TTL1LYR 16FR10ML 100%SIL TMPST TR: Brand: MEDLINE

## (undated) DEVICE — SUTURE VCRL SZ 3-0 L27IN ABSRB UD L26MM SH 1/2 CIR J416H

## (undated) DEVICE — GOWN,AURORA,NONREINFORCED,LARGE: Brand: MEDLINE

## (undated) DEVICE — T4 HOOD

## (undated) DEVICE — TOWEL,OR,DSP,ST,GREEN,DLX,XR,4/PK,20PK/C: Brand: MEDLINE

## (undated) DEVICE — HIGH FLOW TIP

## (undated) DEVICE — DRAPE,ABDOMINAL,MAJOR,STERILE: Brand: MEDLINE

## (undated) DEVICE — Z DISCONTINUED PER MEDLINE USE 2741944 DRESSING AQUACEL 12 IN SURG W9XL30CM SIL CVR WTRPRF VIR BACT BARR ANTIMIC

## (undated) DEVICE — TROCAR: Brand: KII® SLEEVE

## (undated) DEVICE — APPLICATOR MEDICATED 26 CC SOLUTION HI LT ORNG CHLORAPREP

## (undated) DEVICE — PACK,SET UP,DRAPE: Brand: MEDLINE

## (undated) DEVICE — TUBING, SUCTION, 1/4" X 10', STRAIGHT: Brand: MEDLINE

## (undated) DEVICE — SUTURE MCRYL + SZ 4-0 L27IN ABSRB UD L19MM PS-2 3/8 CIR MCP426H

## (undated) DEVICE — PAD,ABDOMINAL,8"X10",ST,LF: Brand: MEDLINE

## (undated) DEVICE — MARKER SURG SKIN GENTIAN VLT REG TIP W/ 6IN RUL

## (undated) DEVICE — ELASTIC BANDAGE: Brand: DEROYAL

## (undated) DEVICE — YANKAUER,BULB TIP,W/O VENT,RIGID,STERILE: Brand: MEDLINE

## (undated) DEVICE — YANKAUER,POOLE TIP,STERILE,50/CS: Brand: MEDLINE

## (undated) DEVICE — Device: Brand: ENDO SMARTCAP

## (undated) DEVICE — BLADE SAW W125XL70MM THK064MM CUT THK094MM REPL RECIP DBL

## (undated) DEVICE — SPONGE,LAP,18"X18",DLX,XR,ST,5/PK,40/PK: Brand: MEDLINE

## (undated) DEVICE — SINGLE PORT MANIFOLD: Brand: NEPTUNE 2

## (undated) DEVICE — GAUZE,SPONGE,4"X4",16PLY,XRAY,STRL,LF: Brand: MEDLINE

## (undated) DEVICE — ELECTRODE PT RET AD L9FT HI MOIST COND ADH HYDRGEL CORDED

## (undated) DEVICE — STERILE PATIENT PROTECTIVE PAD FOR IMP® KNEE POSITIONERS & COHESIVE WRAP (10 / CASE): Brand: DE MAYO KNEE POSITIONER®

## (undated) DEVICE — Device: Brand: STABLECUT®

## (undated) DEVICE — GAUZE SPONGES,12 PLY: Brand: CURITY

## (undated) DEVICE — BRUSH ENDO CLN L90.5IN SHTH DIA1.7MM BRIST DIA5-7MM 2-6MM

## (undated) DEVICE — TROCARS: Brand: KII® BALLOON BLUNT TIP SYSTEM

## (undated) DEVICE — 2000CC GUARDIAN II: Brand: GUARDIAN

## (undated) DEVICE — PACK,LAPAROTOMY,NO GOWNS: Brand: MEDLINE

## (undated) DEVICE — SUTURE VCRL SZ 2-0 L36IN ABSRB UD L36MM CT-1 1/2 CIR J945H

## (undated) DEVICE — 3M™ STERI-DRAPE™ U-DRAPE 1015: Brand: STERI-DRAPE™

## (undated) DEVICE — TUBE SET 96 MM 64 MM H2O PERISTALTIC STD AUX CHANNEL

## (undated) DEVICE — SCREW BNE L25MM DIA2.5MM KNEE FULL THRD HEX FEM PERSONA

## (undated) DEVICE — KIT,ANTI FOG,W/SPONGE & FLUID,SOFT PACK: Brand: MEDLINE

## (undated) DEVICE — UNDERCAST PADDING: Brand: DEROYAL

## (undated) DEVICE — ADAPTER FLSH PMP FLD MGMT GI IRRIG OFP 2 DISPOSABLE

## (undated) DEVICE — SIPS DUAL 2 MINUTE TIP

## (undated) DEVICE — SUTURE VCRL + SZ 1 L27IN ABSRB UD CT-1 L36MM 1/2 CIR TAPR VCPP40D

## (undated) DEVICE — GLOVE ORANGE PI 8   MSG9080

## (undated) DEVICE — WARMER SCP 2 ANTIFOG LAP DISP

## (undated) DEVICE — GOWN,SIRUS,POLYRNF,BRTHSLV,XLN/XL,20/CS: Brand: MEDLINE

## (undated) DEVICE — SUTURE VCRL SZ 4-0 L18IN ABSRB UD L19MM PS-2 3/8 CIR PRIM J496H

## (undated) DEVICE — TOWEL,OR,DSP,ST,BLUE,STD,4/PK,20PK/CS: Brand: MEDLINE

## (undated) DEVICE — GLOVE ORANGE PI 7 1/2   MSG9075

## (undated) DEVICE — CHLORAPREP 26ML ORANGE

## (undated) DEVICE — TRAY CATH CATH OD16FR BG F HYDROCOATED

## (undated) DEVICE — TROCAR: Brand: KII FIOS FIRST ENTRY

## (undated) DEVICE — Z DUPLICATE USE 2431315 SET INSUF TBNG HI FLO W/ SMK EVAC FOR PNEUMOCLEAR

## (undated) DEVICE — STAPLER SKIN H3.9MM WIRE DIA0.58MM CRWN 6.9MM 35 STPL FIX

## (undated) DEVICE — DEVICE TRCR 12X9X3IN WHT CLSR DISP OMNICLOSE

## (undated) DEVICE — DRAPE THER FLUID WARMING 66X44 IN FLAT SLUSH DBL DISC ORS

## (undated) DEVICE — PACK PROCEDURE SURG TOTAL KNEE PACK

## (undated) DEVICE — LABEL MED MINI W/ MARKER

## (undated) DEVICE — GLOVE ORANGE PI 7   MSG9070

## (undated) DEVICE — ENDO CARRY-ON PROCEDURE KIT: Brand: ENDO CARRY-ON PROCEDURE KIT